# Patient Record
Sex: MALE | Race: WHITE | Employment: OTHER | URBAN - METROPOLITAN AREA
[De-identification: names, ages, dates, MRNs, and addresses within clinical notes are randomized per-mention and may not be internally consistent; named-entity substitution may affect disease eponyms.]

---

## 2015-06-08 LAB — DIABETIC RETINOPATHY: NORMAL

## 2016-03-07 LAB — DIABETIC RETINOPATHY: NORMAL

## 2016-04-11 LAB
CHOLESTEROL, TOTAL: 149 MG/DL
CHOLESTEROL/HDL RATIO: 2.5
HDLC SERPL-MCNC: 58 MG/DL (ref 35–70)
LDL CHOLESTEROL CALCULATED: 89 MG/DL (ref 0–160)
TRIGL SERPL-MCNC: 84 MG/DL
VLDLC SERPL CALC-MCNC: 16 MG/DL

## 2016-08-02 LAB — HBA1C MFR BLD: 6.3 %

## 2017-03-01 PROBLEM — E11.9 TYPE 2 DIABETES MELLITUS (HCC): Status: ACTIVE | Noted: 2017-03-01

## 2017-03-01 PROBLEM — J44.1 COPD EXACERBATION (HCC): Status: ACTIVE | Noted: 2017-03-01

## 2017-03-01 PROBLEM — J40 BRONCHITIS: Status: ACTIVE | Noted: 2017-03-01

## 2017-03-02 ENCOUNTER — TELEPHONE (OUTPATIENT)
Dept: FAMILY MEDICINE CLINIC | Age: 72
End: 2017-03-02

## 2017-03-03 PROBLEM — R11.2 NON-INTRACTABLE VOMITING WITH NAUSEA: Status: ACTIVE | Noted: 2017-03-03

## 2017-03-07 PROBLEM — E16.4 ZOLLINGER-ELLISON SYNDROME: Status: ACTIVE | Noted: 2017-03-07

## 2017-03-20 ENCOUNTER — CARE COORDINATION (OUTPATIENT)
Dept: CARE COORDINATION | Age: 72
End: 2017-03-20

## 2017-03-21 ENCOUNTER — OFFICE VISIT (OUTPATIENT)
Dept: PULMONOLOGY | Age: 72
End: 2017-03-21

## 2017-03-21 VITALS
HEART RATE: 84 BPM | DIASTOLIC BLOOD PRESSURE: 76 MMHG | OXYGEN SATURATION: 98 % | WEIGHT: 208 LBS | SYSTOLIC BLOOD PRESSURE: 120 MMHG | RESPIRATION RATE: 16 BRPM | BODY MASS INDEX: 29.12 KG/M2 | HEIGHT: 71 IN | TEMPERATURE: 99 F

## 2017-03-21 DIAGNOSIS — J96.11 CHRONIC HYPOXEMIC RESPIRATORY FAILURE (HCC): Primary | ICD-10-CM

## 2017-03-21 DIAGNOSIS — J43.2 CENTRILOBULAR EMPHYSEMA (HCC): ICD-10-CM

## 2017-03-21 PROBLEM — J44.9 COPD (CHRONIC OBSTRUCTIVE PULMONARY DISEASE) (HCC): Status: ACTIVE | Noted: 2017-03-21

## 2017-03-21 PROCEDURE — 3017F COLORECTAL CA SCREEN DOC REV: CPT | Performed by: PHYSICIAN ASSISTANT

## 2017-03-21 PROCEDURE — 1036F TOBACCO NON-USER: CPT | Performed by: PHYSICIAN ASSISTANT

## 2017-03-21 PROCEDURE — G8926 SPIRO NO PERF OR DOC: HCPCS | Performed by: PHYSICIAN ASSISTANT

## 2017-03-21 PROCEDURE — 99214 OFFICE O/P EST MOD 30 MIN: CPT | Performed by: PHYSICIAN ASSISTANT

## 2017-03-21 PROCEDURE — G8420 CALC BMI NORM PARAMETERS: HCPCS | Performed by: PHYSICIAN ASSISTANT

## 2017-03-21 PROCEDURE — G8427 DOCREV CUR MEDS BY ELIG CLIN: HCPCS | Performed by: PHYSICIAN ASSISTANT

## 2017-03-21 PROCEDURE — 3023F SPIROM DOC REV: CPT | Performed by: PHYSICIAN ASSISTANT

## 2017-03-21 PROCEDURE — 1123F ACP DISCUSS/DSCN MKR DOCD: CPT | Performed by: PHYSICIAN ASSISTANT

## 2017-03-21 PROCEDURE — 4040F PNEUMOC VAC/ADMIN/RCVD: CPT | Performed by: PHYSICIAN ASSISTANT

## 2017-03-21 PROCEDURE — G8484 FLU IMMUNIZE NO ADMIN: HCPCS | Performed by: PHYSICIAN ASSISTANT

## 2017-03-21 PROCEDURE — 1111F DSCHRG MED/CURRENT MED MERGE: CPT | Performed by: PHYSICIAN ASSISTANT

## 2017-03-21 ASSESSMENT — ENCOUNTER SYMPTOMS
EYES NEGATIVE: 1
NAUSEA: 0
GASTROINTESTINAL NEGATIVE: 1
SHORTNESS OF BREATH: 0
RESPIRATORY NEGATIVE: 1
SPUTUM PRODUCTION: 0
VOMITING: 0
BACK PAIN: 0
COUGH: 0
HEARTBURN: 0
SORE THROAT: 0
HEMOPTYSIS: 0
WHEEZING: 0

## 2017-03-30 ENCOUNTER — OFFICE VISIT (OUTPATIENT)
Dept: FAMILY MEDICINE CLINIC | Age: 72
End: 2017-03-30

## 2017-03-30 VITALS
RESPIRATION RATE: 20 BRPM | TEMPERATURE: 98.5 F | BODY MASS INDEX: 28.31 KG/M2 | DIASTOLIC BLOOD PRESSURE: 64 MMHG | SYSTOLIC BLOOD PRESSURE: 130 MMHG | HEART RATE: 80 BPM | OXYGEN SATURATION: 97 % | WEIGHT: 203 LBS

## 2017-03-30 DIAGNOSIS — J44.9 CHRONIC OBSTRUCTIVE PULMONARY DISEASE, UNSPECIFIED COPD TYPE (HCC): ICD-10-CM

## 2017-03-30 DIAGNOSIS — B37.0 THRUSH: ICD-10-CM

## 2017-03-30 DIAGNOSIS — I82.4Y3 DEEP VEIN THROMBOSIS (DVT) OF PROXIMAL VEIN OF BOTH LOWER EXTREMITIES, UNSPECIFIED CHRONICITY (HCC): Primary | ICD-10-CM

## 2017-03-30 DIAGNOSIS — E11.9 TYPE 2 DIABETES MELLITUS WITHOUT COMPLICATION, WITHOUT LONG-TERM CURRENT USE OF INSULIN (HCC): ICD-10-CM

## 2017-03-30 PROCEDURE — 99495 TRANSJ CARE MGMT MOD F2F 14D: CPT | Performed by: FAMILY MEDICINE

## 2017-03-30 ASSESSMENT — PATIENT HEALTH QUESTIONNAIRE - PHQ9
SUM OF ALL RESPONSES TO PHQ QUESTIONS 1-9: 0
1. LITTLE INTEREST OR PLEASURE IN DOING THINGS: 0
2. FEELING DOWN, DEPRESSED OR HOPELESS: 0
SUM OF ALL RESPONSES TO PHQ9 QUESTIONS 1 & 2: 0

## 2017-04-03 ENCOUNTER — CARE COORDINATION (OUTPATIENT)
Dept: CARE COORDINATION | Age: 72
End: 2017-04-03

## 2017-04-04 ENCOUNTER — TELEPHONE (OUTPATIENT)
Dept: FAMILY MEDICINE CLINIC | Age: 72
End: 2017-04-04

## 2017-05-04 ENCOUNTER — CARE COORDINATION (OUTPATIENT)
Dept: CARE COORDINATION | Age: 72
End: 2017-05-04

## 2017-05-05 ENCOUNTER — OFFICE VISIT (OUTPATIENT)
Dept: PULMONOLOGY | Age: 72
End: 2017-05-05

## 2017-05-05 VITALS
HEIGHT: 70 IN | HEART RATE: 76 BPM | RESPIRATION RATE: 17 BRPM | TEMPERATURE: 98.6 F | WEIGHT: 207 LBS | DIASTOLIC BLOOD PRESSURE: 68 MMHG | BODY MASS INDEX: 29.63 KG/M2 | SYSTOLIC BLOOD PRESSURE: 138 MMHG | OXYGEN SATURATION: 95 %

## 2017-05-05 DIAGNOSIS — J96.11 CHRONIC RESPIRATORY FAILURE WITH HYPOXIA (HCC): ICD-10-CM

## 2017-05-05 DIAGNOSIS — J43.2 CENTRILOBULAR EMPHYSEMA (HCC): Primary | ICD-10-CM

## 2017-05-05 PROCEDURE — G8427 DOCREV CUR MEDS BY ELIG CLIN: HCPCS | Performed by: PHYSICIAN ASSISTANT

## 2017-05-05 PROCEDURE — 99213 OFFICE O/P EST LOW 20 MIN: CPT | Performed by: PHYSICIAN ASSISTANT

## 2017-05-05 PROCEDURE — 1036F TOBACCO NON-USER: CPT | Performed by: PHYSICIAN ASSISTANT

## 2017-05-05 PROCEDURE — 1123F ACP DISCUSS/DSCN MKR DOCD: CPT | Performed by: PHYSICIAN ASSISTANT

## 2017-05-05 PROCEDURE — 3017F COLORECTAL CA SCREEN DOC REV: CPT | Performed by: PHYSICIAN ASSISTANT

## 2017-05-05 PROCEDURE — 3023F SPIROM DOC REV: CPT | Performed by: PHYSICIAN ASSISTANT

## 2017-05-05 PROCEDURE — 4040F PNEUMOC VAC/ADMIN/RCVD: CPT | Performed by: PHYSICIAN ASSISTANT

## 2017-05-05 PROCEDURE — G8926 SPIRO NO PERF OR DOC: HCPCS | Performed by: PHYSICIAN ASSISTANT

## 2017-05-05 PROCEDURE — G8420 CALC BMI NORM PARAMETERS: HCPCS | Performed by: PHYSICIAN ASSISTANT

## 2017-05-05 ASSESSMENT — ENCOUNTER SYMPTOMS
SORE THROAT: 0
BACK PAIN: 0
HEARTBURN: 0
HEMOPTYSIS: 0
NAUSEA: 0
COUGH: 0
SHORTNESS OF BREATH: 1
EYES NEGATIVE: 1
SPUTUM PRODUCTION: 0
VOMITING: 0
GASTROINTESTINAL NEGATIVE: 1
WHEEZING: 0

## 2017-05-22 ENCOUNTER — TELEPHONE (OUTPATIENT)
Dept: PULMONOLOGY | Age: 72
End: 2017-05-22

## 2017-06-19 ENCOUNTER — CARE COORDINATION (OUTPATIENT)
Dept: CARE COORDINATION | Age: 72
End: 2017-06-19

## 2017-08-07 ENCOUNTER — OFFICE VISIT (OUTPATIENT)
Dept: PULMONOLOGY | Age: 72
End: 2017-08-07
Payer: MEDICARE

## 2017-08-07 VITALS
SYSTOLIC BLOOD PRESSURE: 134 MMHG | BODY MASS INDEX: 31.08 KG/M2 | TEMPERATURE: 98.6 F | RESPIRATION RATE: 20 BRPM | HEIGHT: 71 IN | OXYGEN SATURATION: 93 % | HEART RATE: 73 BPM | DIASTOLIC BLOOD PRESSURE: 62 MMHG | WEIGHT: 222 LBS

## 2017-08-07 DIAGNOSIS — J96.11 CHRONIC HYPOXEMIC RESPIRATORY FAILURE (HCC): ICD-10-CM

## 2017-08-07 DIAGNOSIS — J43.2 CENTRILOBULAR EMPHYSEMA (HCC): Primary | ICD-10-CM

## 2017-08-07 PROCEDURE — G8926 SPIRO NO PERF OR DOC: HCPCS | Performed by: PHYSICIAN ASSISTANT

## 2017-08-07 PROCEDURE — G8417 CALC BMI ABV UP PARAM F/U: HCPCS | Performed by: PHYSICIAN ASSISTANT

## 2017-08-07 PROCEDURE — 1036F TOBACCO NON-USER: CPT | Performed by: PHYSICIAN ASSISTANT

## 2017-08-07 PROCEDURE — 99213 OFFICE O/P EST LOW 20 MIN: CPT | Performed by: PHYSICIAN ASSISTANT

## 2017-08-07 PROCEDURE — G8427 DOCREV CUR MEDS BY ELIG CLIN: HCPCS | Performed by: PHYSICIAN ASSISTANT

## 2017-08-07 PROCEDURE — 4040F PNEUMOC VAC/ADMIN/RCVD: CPT | Performed by: PHYSICIAN ASSISTANT

## 2017-08-07 PROCEDURE — 3017F COLORECTAL CA SCREEN DOC REV: CPT | Performed by: PHYSICIAN ASSISTANT

## 2017-08-07 PROCEDURE — 3023F SPIROM DOC REV: CPT | Performed by: PHYSICIAN ASSISTANT

## 2017-08-07 PROCEDURE — 1123F ACP DISCUSS/DSCN MKR DOCD: CPT | Performed by: PHYSICIAN ASSISTANT

## 2017-08-07 ASSESSMENT — ENCOUNTER SYMPTOMS
VOMITING: 0
BACK PAIN: 0
HEMOPTYSIS: 0
GASTROINTESTINAL NEGATIVE: 1
COUGH: 0
EYES NEGATIVE: 1
SORE THROAT: 0
NAUSEA: 0
SPUTUM PRODUCTION: 0
WHEEZING: 0
SHORTNESS OF BREATH: 1
HEARTBURN: 0

## 2017-08-08 ENCOUNTER — TELEPHONE (OUTPATIENT)
Dept: PULMONOLOGY | Age: 72
End: 2017-08-08

## 2017-08-22 ENCOUNTER — OFFICE VISIT (OUTPATIENT)
Dept: FAMILY MEDICINE CLINIC | Age: 72
End: 2017-08-22
Payer: MEDICARE

## 2017-08-22 VITALS
SYSTOLIC BLOOD PRESSURE: 138 MMHG | DIASTOLIC BLOOD PRESSURE: 66 MMHG | HEART RATE: 64 BPM | BODY MASS INDEX: 30.82 KG/M2 | TEMPERATURE: 98.6 F | WEIGHT: 221 LBS | RESPIRATION RATE: 14 BRPM

## 2017-08-22 DIAGNOSIS — J02.9 EXUDATIVE PHARYNGITIS: Primary | ICD-10-CM

## 2017-08-22 PROCEDURE — G8427 DOCREV CUR MEDS BY ELIG CLIN: HCPCS | Performed by: FAMILY MEDICINE

## 2017-08-22 PROCEDURE — 3017F COLORECTAL CA SCREEN DOC REV: CPT | Performed by: FAMILY MEDICINE

## 2017-08-22 PROCEDURE — 1036F TOBACCO NON-USER: CPT | Performed by: FAMILY MEDICINE

## 2017-08-22 PROCEDURE — 99213 OFFICE O/P EST LOW 20 MIN: CPT | Performed by: FAMILY MEDICINE

## 2017-08-22 PROCEDURE — G8417 CALC BMI ABV UP PARAM F/U: HCPCS | Performed by: FAMILY MEDICINE

## 2017-08-22 PROCEDURE — 1123F ACP DISCUSS/DSCN MKR DOCD: CPT | Performed by: FAMILY MEDICINE

## 2017-08-22 PROCEDURE — 4040F PNEUMOC VAC/ADMIN/RCVD: CPT | Performed by: FAMILY MEDICINE

## 2017-08-22 RX ORDER — AMOXICILLIN 500 MG/1
500 CAPSULE ORAL 3 TIMES DAILY
Qty: 30 CAPSULE | Refills: 0 | Status: SHIPPED | OUTPATIENT
Start: 2017-08-22 | End: 2017-11-13 | Stop reason: SDUPTHER

## 2017-08-22 ASSESSMENT — ENCOUNTER SYMPTOMS
SINUS PRESSURE: 0
COUGH: 0
CONSTIPATION: 0
NAUSEA: 0
DIARRHEA: 0
SORE THROAT: 1
ABDOMINAL PAIN: 0
SHORTNESS OF BREATH: 0
RHINORRHEA: 0
ABDOMINAL DISTENTION: 0
EYE PAIN: 0

## 2017-08-25 RX ORDER — SUCRALFATE ORAL 1 G/10ML
1 SUSPENSION ORAL
Qty: 1200 ML | Refills: 3 | Status: SHIPPED | OUTPATIENT
Start: 2017-08-25 | End: 2018-08-27

## 2017-10-11 ENCOUNTER — TELEPHONE (OUTPATIENT)
Dept: PULMONOLOGY | Age: 72
End: 2017-10-11

## 2017-10-12 DIAGNOSIS — J43.2 CENTRILOBULAR EMPHYSEMA (HCC): Primary | ICD-10-CM

## 2017-10-12 NOTE — TELEPHONE ENCOUNTER
Faxed order to [facility]. Left message for patient advising of this.   Mailed original order to patient per his request.

## 2017-11-07 ENCOUNTER — CARE COORDINATION (OUTPATIENT)
Dept: CARE COORDINATION | Age: 72
End: 2017-11-07

## 2017-11-08 ENCOUNTER — TELEPHONE (OUTPATIENT)
Dept: FAMILY MEDICINE CLINIC | Age: 72
End: 2017-11-08

## 2017-11-13 ENCOUNTER — OFFICE VISIT (OUTPATIENT)
Dept: FAMILY MEDICINE CLINIC | Age: 72
End: 2017-11-13
Payer: MEDICARE

## 2017-11-13 VITALS
DIASTOLIC BLOOD PRESSURE: 60 MMHG | BODY MASS INDEX: 32 KG/M2 | HEIGHT: 71 IN | WEIGHT: 228.6 LBS | SYSTOLIC BLOOD PRESSURE: 122 MMHG | TEMPERATURE: 98 F | HEART RATE: 72 BPM | RESPIRATION RATE: 14 BRPM

## 2017-11-13 DIAGNOSIS — J40 BRONCHITIS: Primary | ICD-10-CM

## 2017-11-13 PROCEDURE — 4040F PNEUMOC VAC/ADMIN/RCVD: CPT | Performed by: FAMILY MEDICINE

## 2017-11-13 PROCEDURE — 1123F ACP DISCUSS/DSCN MKR DOCD: CPT | Performed by: FAMILY MEDICINE

## 2017-11-13 PROCEDURE — 99213 OFFICE O/P EST LOW 20 MIN: CPT | Performed by: FAMILY MEDICINE

## 2017-11-13 PROCEDURE — G8482 FLU IMMUNIZE ORDER/ADMIN: HCPCS | Performed by: FAMILY MEDICINE

## 2017-11-13 PROCEDURE — 1036F TOBACCO NON-USER: CPT | Performed by: FAMILY MEDICINE

## 2017-11-13 PROCEDURE — G8427 DOCREV CUR MEDS BY ELIG CLIN: HCPCS | Performed by: FAMILY MEDICINE

## 2017-11-13 PROCEDURE — G8417 CALC BMI ABV UP PARAM F/U: HCPCS | Performed by: FAMILY MEDICINE

## 2017-11-13 PROCEDURE — 3017F COLORECTAL CA SCREEN DOC REV: CPT | Performed by: FAMILY MEDICINE

## 2017-11-13 RX ORDER — AMOXICILLIN 500 MG/1
500 CAPSULE ORAL 3 TIMES DAILY
Qty: 30 CAPSULE | Refills: 0 | Status: SHIPPED | OUTPATIENT
Start: 2017-11-13 | End: 2017-11-23

## 2017-11-13 ASSESSMENT — ENCOUNTER SYMPTOMS
SINUS PRESSURE: 1
SORE THROAT: 1
EYE PAIN: 0
CONSTIPATION: 0
SHORTNESS OF BREATH: 0
ABDOMINAL PAIN: 0
SINUS PAIN: 1
NAUSEA: 0
DIARRHEA: 0
COUGH: 1
RHINORRHEA: 1
ABDOMINAL DISTENTION: 0

## 2017-11-13 NOTE — PROGRESS NOTES
[address]  [facility]  [facility]  Dept: [phone]  Dept Fax: [phone]: [phone]  PROGRESS NOTE      Visit Date: 11/13/2017    Ata Topete is a 67 y.o. male who presents today for:  Chief Complaint   Patient presents with    Other     here today for chest cold- cough, runny nose, stuffy nose, sore throat x friday. Subjective:  HPI  Patient with history of CBD comes in with chest cold tightness wheezing. 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  Skin runny nose sore throat symptoms since Friday nothing makes better or worse, symptoms seem to be worsening    Review of Systems   Constitutional: Negative for appetite change and fever. HENT: Positive for congestion, postnasal drip, rhinorrhea, sinus pain, sinus pressure and sore throat. Negative for ear pain. Eyes: Negative for pain and visual disturbance. Respiratory: Positive for cough. Negative for shortness of breath. Cardiovascular: Negative for chest pain. Gastrointestinal: Negative for abdominal distention, abdominal pain, constipation, diarrhea and nausea. Genitourinary: Negative for dysuria, frequency and urgency. Musculoskeletal: Negative for arthralgias. Skin: Negative for rash. Neurological: Negative for dizziness. Past Medical History:   Diagnosis Date    Arthritis     Cancer ([facility] [address] 1990    Spelenectomy, cholycystectomy,partial pancrease, partial stomache, lymphnodes    COPD (chronic obstructive pulmonary disease) (HCC)     Hypertension     Nausea & vomiting     Type II or unspecified type diabetes mellitus without mention of complication, not stated as uncontrolled       Past Surgical History:   Procedure Laterality Date    CHOLECYSTECTOMY      COLONOSCOPY  3/16/12    [facility]    CYST REMOVAL      FROM NECK AND BACK    HERNIA REPAIR      OTHER SURGICAL HISTORY  1/25/2013    RIGHT POST.  CERVICAL LAMINOTOMIES AND FORAMINOTOMIES   Brand [place] materials - see patient instructions. Discussed use, benefit, and side effects of prescribed medications. All patient questions answered. Pt voiced understanding. Reviewed health maintenance. Patient agreed with treatment plan. Follow up as directed. **This report has been created using voice recognition software. It may contain minor errors which are inherent in voice recognition technology. **       Electronically signed by Claudell Alken, MD on 11/13/2017 at 5:40 PM

## 2017-11-30 ENCOUNTER — TELEPHONE (OUTPATIENT)
Dept: FAMILY MEDICINE CLINIC | Age: 72
End: 2017-11-30

## 2017-11-30 RX ORDER — AZITHROMYCIN 250 MG/1
TABLET, FILM COATED ORAL
Qty: 1 PACKET | Refills: 0 | Status: SHIPPED | OUTPATIENT
Start: 2017-11-30 | End: 2017-12-10

## 2017-11-30 NOTE — TELEPHONE ENCOUNTER
Patient was seen 11/13/17 and given a prescription for cold symptoms. He is calling in because he finished the medication, and the next day his symptoms came right back, sore throat, cough, sinus congestion. Is there another medication he can try? He uses [facility] on [address], please advise.

## 2017-12-20 ENCOUNTER — CARE COORDINATION (OUTPATIENT)
Dept: CARE COORDINATION | Age: 72
End: 2017-12-20

## 2018-01-16 ENCOUNTER — CARE COORDINATION (OUTPATIENT)
Dept: CARE COORDINATION | Age: 73
End: 2018-01-16

## 2018-03-05 ENCOUNTER — CARE COORDINATION (OUTPATIENT)
Dept: CARE COORDINATION | Age: 73
End: 2018-03-05

## 2018-03-06 ENCOUNTER — OFFICE VISIT (OUTPATIENT)
Dept: PULMONOLOGY | Age: 73
End: 2018-03-06
Payer: MEDICARE

## 2018-03-06 VITALS
SYSTOLIC BLOOD PRESSURE: 124 MMHG | WEIGHT: 225 LBS | HEIGHT: 71 IN | DIASTOLIC BLOOD PRESSURE: 74 MMHG | OXYGEN SATURATION: 93 % | HEART RATE: 73 BPM | BODY MASS INDEX: 31.5 KG/M2

## 2018-03-06 DIAGNOSIS — J43.2 CENTRILOBULAR EMPHYSEMA (HCC): Primary | ICD-10-CM

## 2018-03-06 DIAGNOSIS — J43.9 NOCTURNAL HYPOXEMIA DUE TO EMPHYSEMA (HCC): ICD-10-CM

## 2018-03-06 DIAGNOSIS — G47.36 NOCTURNAL HYPOXEMIA DUE TO EMPHYSEMA (HCC): ICD-10-CM

## 2018-03-06 PROCEDURE — G8926 SPIRO NO PERF OR DOC: HCPCS | Performed by: PHYSICIAN ASSISTANT

## 2018-03-06 PROCEDURE — 99213 OFFICE O/P EST LOW 20 MIN: CPT | Performed by: PHYSICIAN ASSISTANT

## 2018-03-06 PROCEDURE — G8482 FLU IMMUNIZE ORDER/ADMIN: HCPCS | Performed by: PHYSICIAN ASSISTANT

## 2018-03-06 PROCEDURE — G8417 CALC BMI ABV UP PARAM F/U: HCPCS | Performed by: PHYSICIAN ASSISTANT

## 2018-03-06 PROCEDURE — 3017F COLORECTAL CA SCREEN DOC REV: CPT | Performed by: PHYSICIAN ASSISTANT

## 2018-03-06 PROCEDURE — 4040F PNEUMOC VAC/ADMIN/RCVD: CPT | Performed by: PHYSICIAN ASSISTANT

## 2018-03-06 PROCEDURE — G8427 DOCREV CUR MEDS BY ELIG CLIN: HCPCS | Performed by: PHYSICIAN ASSISTANT

## 2018-03-06 PROCEDURE — 3023F SPIROM DOC REV: CPT | Performed by: PHYSICIAN ASSISTANT

## 2018-03-06 PROCEDURE — 1123F ACP DISCUSS/DSCN MKR DOCD: CPT | Performed by: PHYSICIAN ASSISTANT

## 2018-03-06 PROCEDURE — 1036F TOBACCO NON-USER: CPT | Performed by: PHYSICIAN ASSISTANT

## 2018-03-06 ASSESSMENT — ENCOUNTER SYMPTOMS
SINUS PAIN: 0
SPUTUM PRODUCTION: 0
ORTHOPNEA: 0
WHEEZING: 0
SORE THROAT: 0
RESPIRATORY NEGATIVE: 1
COUGH: 0
SHORTNESS OF BREATH: 0
NAUSEA: 0
HEARTBURN: 0
EYES NEGATIVE: 1
GASTROINTESTINAL NEGATIVE: 1

## 2018-03-06 NOTE — PROGRESS NOTES
[facility]    Patient: Sergio Mckee, 67 y.o.   : 1945  3/6/2018    Pt of Dr. Jabari Lema   Patient presents with    Shortness of Breath     EMphysema 7 mo f/u, needs face to face for medicare for O2 @ night , testing not required        HPI  Baltazar Faust is here for follow up for Emphysema. He needs face to face for O2 per medicare. He is wearing O2 at 2 lnc at night. He is getting benefit from O2. He is still working and staying active. His dyspnea is the same. He is taking Advair with good benefit. He is rarely using albuterol. Progress History:   Since last visit any new medical issues? No  New ER or hospitlal visits? No  Any new or changes in medicines? No  Using inhalers? No  Are they helpful? No  Past Medical hx   PMH:  Past Medical History:   Diagnosis Date    Arthritis     Cancer ([facility] [address]     Spelenectomy, cholycystectomy,partial pancrease, partial stomache, lymphnodes    COPD (chronic obstructive pulmonary disease) (HCC)     Hypertension     Nausea & vomiting     Type II or unspecified type diabetes mellitus without mention of complication, not stated as uncontrolled      SURGICAL HISTORY:  Past Surgical History:   Procedure Laterality Date    CHOLECYSTECTOMY      COLONOSCOPY  3/16/12    [address]    CYST REMOVAL      FROM NECK AND BACK    HERNIA REPAIR      OTHER SURGICAL HISTORY  2013    RIGHT POST.  CERVICAL LAMINOTOMIES AND FORAMINOTOMIES    PANCREAS SURGERY      partial removal     PARATHYROID GLAND SURGERY      PILONIDAL CYST EXCISION      SPLENECTOMY, TOTAL       SOCIAL HISTORY:  Social History   Substance Use Topics    Smoking status: Former Smoker     Packs/day: 2.00     Years: 20.00     Types: Cigarettes     Quit date: 1991    Smokeless tobacco: Never Used    Alcohol use 0.0 oz/week      Comment: rarely     ALLERGIES:  Allergies   Allergen Reactions    Propofol Anaphylaxis    Bee Venom

## 2018-04-23 ENCOUNTER — OFFICE VISIT (OUTPATIENT)
Dept: PSYCHOLOGY | Age: 73
End: 2018-04-23
Payer: MEDICARE

## 2018-04-23 DIAGNOSIS — F43.21 COMPLICATED GRIEF: ICD-10-CM

## 2018-04-23 DIAGNOSIS — F43.21 ADJUSTMENT DISORDER WITH DEPRESSED MOOD: ICD-10-CM

## 2018-04-23 PROCEDURE — 90791 PSYCH DIAGNOSTIC EVALUATION: CPT | Performed by: PSYCHOLOGIST

## 2018-04-23 ASSESSMENT — PATIENT HEALTH QUESTIONNAIRE - PHQ9
8. MOVING OR SPEAKING SO SLOWLY THAT OTHER PEOPLE COULD HAVE NOTICED. OR THE OPPOSITE, BEING SO FIGETY OR RESTLESS THAT YOU HAVE BEEN MOVING AROUND A LOT MORE THAN USUAL: 0
2. FEELING DOWN, DEPRESSED OR HOPELESS: 0
4. FEELING TIRED OR HAVING LITTLE ENERGY: 1
1. LITTLE INTEREST OR PLEASURE IN DOING THINGS: 0
3. TROUBLE FALLING OR STAYING ASLEEP: 0
6. FEELING BAD ABOUT YOURSELF - OR THAT YOU ARE A FAILURE OR HAVE LET YOURSELF OR YOUR FAMILY DOWN: 0
9. THOUGHTS THAT YOU WOULD BE BETTER OFF DEAD, OR OF HURTING YOURSELF: 0
5. POOR APPETITE OR OVEREATING: 0
10. IF YOU CHECKED OFF ANY PROBLEMS, HOW DIFFICULT HAVE THESE PROBLEMS MADE IT FOR YOU TO DO YOUR WORK, TAKE CARE OF THINGS AT HOME, OR GET ALONG WITH OTHER PEOPLE: 0
7. TROUBLE CONCENTRATING ON THINGS, SUCH AS READING THE NEWSPAPER OR WATCHING TELEVISION: 0
SUM OF ALL RESPONSES TO PHQ QUESTIONS 1-9: 1
SUM OF ALL RESPONSES TO PHQ9 QUESTIONS 1 & 2: 0

## 2018-04-23 ASSESSMENT — ANXIETY QUESTIONNAIRES
5. BEING SO RESTLESS THAT IT IS HARD TO SIT STILL: 0-NOT AT ALL SURE
3. WORRYING TOO MUCH ABOUT DIFFERENT THINGS: 0-NOT AT ALL SURE
7. FEELING AFRAID AS IF SOMETHING AWFUL MIGHT HAPPEN: 0-NOT AT ALL SURE
GAD7 TOTAL SCORE: 2
6. BECOMING EASILY ANNOYED OR IRRITABLE: 1-SEVERAL DAYS
2. NOT BEING ABLE TO STOP OR CONTROL WORRYING: 0-NOT AT ALL SURE
1. FEELING NERVOUS, ANXIOUS, OR ON EDGE: 1-SEVERAL DAYS
4. TROUBLE RELAXING: 0-NOT AT ALL SURE

## 2018-04-24 PROBLEM — F43.21 ADJUSTMENT DISORDER WITH DEPRESSED MOOD: Status: ACTIVE | Noted: 2018-04-24

## 2018-04-25 ENCOUNTER — CARE COORDINATION (OUTPATIENT)
Dept: CARE COORDINATION | Age: 73
End: 2018-04-25

## 2018-06-21 ENCOUNTER — CARE COORDINATION (OUTPATIENT)
Dept: CARE COORDINATION | Age: 73
End: 2018-06-21

## 2018-06-21 NOTE — CARE COORDINATION
Attempted to reach patient for continued Care Coordination follow up and education. Patient was unavailable at the time of my call, and a generic voicemail message was left asking patient to return my call at [phone].

## 2018-07-12 NOTE — CARE COORDINATION
I spoke briefly with Jaylyn Pena and she states the patient is doing extremely well. Denies current issues or concerns. I advised Jaylyn Pena to contact PCP office if needed. No further needs at this time.

## 2018-08-20 ENCOUNTER — CARE COORDINATION (OUTPATIENT)
Dept: CARE COORDINATION | Age: 73
End: 2018-08-20

## 2018-08-20 NOTE — CARE COORDINATION
Ambulatory Care Coordination Note  8/20/2018  CM Risk Score: 3  [place] Mortality Risk Score: 7.65    ACC: Jose Manuel Nravaez, RN    Summary Note: David Blake is currently at work so this [address] spoke with his wife. She states that he has complaining of right sided hip/back pain for a few months. Denies any other issues. Appt scheduled with PCP. Care Coordination Interventions    Program Enrollment:  Rising Risk  Referral from Primary Care Provider:  No  Suggested Interventions and Community Resources  Diabetes Education: In Process  Fall Risk Prevention: In Process  Zone Management Tools: In Process         Goals Addressed             Most Recent     Conditions and Symptoms   Improving (8/20/2018)             I will notify my provider of any symptoms that indicate a worsening of my condition. Barriers: overwhelmed by complexity of regimen  Plan for overcoming my barriers: Will discuss questions/concerns with office or care coordinator  Confidence: 9/10  Anticipated Goal Completion Date: 2/8/18              Prior to Admission medications    Medication Sig Start Date End Date Taking?  Authorizing Provider   ADVAIR DISKUS 250-50 MCG/DOSE AEPB USE 1 INHALATION TWICE A DAY 8/20/18   Savannah Smalls PA-C   sucralfate (CARAFATE) 1 GM/10ML suspension Take 10 mLs by mouth 4 times daily (before meals and nightly) 8/25/17   Nicki Marrero MD   albuterol sulfate (PROAIR RESPICLICK) 584 (90 Base) MCG/ACT aerosol powder inhalation Inhale 2 puffs into the lungs every 6 hours as needed for Wheezing or Shortness of Breath 8/8/17   Savannah Smalls PA-C   OXYGEN Inhale into the lungs 2 L qhs  per [place]an[place] Zelaya    Historical Provider, MD   glipiZIDE (GLIPIZIDE XL) 10 MG extended release tablet Take 10 mg by mouth 2 times daily    Historical Provider, MD   sildenafil (VIAGRA) 100 MG tablet Take 100 mg by mouth as needed for Erectile Dysfunction    Historical Provider, MD   ipratropium-albuterol (DUONEB) 0.5-2.5 (3) MG/3ML SOLN nebulizer solution Inhale 3 mLs into the lungs every 4 hours as needed for Shortness of Breath 2/18/17   SHONNA Daly CNP   ALPRAZolam [place] Mg) 0.25 MG tablet Take 0.25 mg by mouth nightly as needed for Sleep. Historical Provider, MD   TRAVATALALITA Z 0.004 % SOLN ophthalmic solution Place 1 drop into both eyes nightly. 9/12/12   Historical Provider, MD   Insulin Lispro Prot & Lispro (HUMALOG MIX 75/25 SC) Inject 30 Units into the skin 2 times daily     Historical Provider, MD   quinapril (ACCUPRIL) 20 MG tablet Take 20 mg by mouth 2 times daily. Historical Provider, MD   esomeprazole (NEXIUM) 40 MG capsule Take 40 mg by mouth 2 times daily.       Historical Provider, MD   metformin (GLUCOPHAGE) 500 MG tablet Take 500 mg by mouth 2 times daily (with meals) HOLD 48 HRS PRIOR TO SURGERY    Historical Provider, MD       Future Appointments  Date Time Provider Nancy Mendezisti   8/23/2018 2:40 PM Victoria Roa MD [facility]   3/6/2019 1:30 PM SHONNA Rodriguez - CNP Pul Med [address] [facility]

## 2018-08-27 ENCOUNTER — OFFICE VISIT (OUTPATIENT)
Dept: FAMILY MEDICINE CLINIC | Age: 73
End: 2018-08-27
Payer: MEDICARE

## 2018-08-27 VITALS
RESPIRATION RATE: 16 BRPM | WEIGHT: 234 LBS | DIASTOLIC BLOOD PRESSURE: 62 MMHG | HEART RATE: 71 BPM | SYSTOLIC BLOOD PRESSURE: 138 MMHG | BODY MASS INDEX: 32.64 KG/M2

## 2018-08-27 DIAGNOSIS — M54.16 LUMBAR RADICULOPATHY: Primary | ICD-10-CM

## 2018-08-27 DIAGNOSIS — E11.9 TYPE 2 DIABETES MELLITUS WITHOUT COMPLICATION, WITHOUT LONG-TERM CURRENT USE OF INSULIN (HCC): ICD-10-CM

## 2018-08-27 PROCEDURE — 96372 THER/PROPH/DIAG INJ SC/IM: CPT | Performed by: FAMILY MEDICINE

## 2018-08-27 PROCEDURE — 1101F PT FALLS ASSESS-DOCD LE1/YR: CPT | Performed by: FAMILY MEDICINE

## 2018-08-27 PROCEDURE — 99213 OFFICE O/P EST LOW 20 MIN: CPT | Performed by: FAMILY MEDICINE

## 2018-08-27 PROCEDURE — 2022F DILAT RTA XM EVC RTNOPTHY: CPT | Performed by: FAMILY MEDICINE

## 2018-08-27 PROCEDURE — 1123F ACP DISCUSS/DSCN MKR DOCD: CPT | Performed by: FAMILY MEDICINE

## 2018-08-27 PROCEDURE — G8417 CALC BMI ABV UP PARAM F/U: HCPCS | Performed by: FAMILY MEDICINE

## 2018-08-27 PROCEDURE — 3046F HEMOGLOBIN A1C LEVEL >9.0%: CPT | Performed by: FAMILY MEDICINE

## 2018-08-27 PROCEDURE — 1036F TOBACCO NON-USER: CPT | Performed by: FAMILY MEDICINE

## 2018-08-27 PROCEDURE — 3017F COLORECTAL CA SCREEN DOC REV: CPT | Performed by: FAMILY MEDICINE

## 2018-08-27 PROCEDURE — 4040F PNEUMOC VAC/ADMIN/RCVD: CPT | Performed by: FAMILY MEDICINE

## 2018-08-27 PROCEDURE — G8427 DOCREV CUR MEDS BY ELIG CLIN: HCPCS | Performed by: FAMILY MEDICINE

## 2018-08-27 RX ORDER — DOXAZOSIN 8 MG/1
1 TABLET ORAL NIGHTLY
COMMUNITY
Start: 2018-08-16

## 2018-08-27 RX ORDER — ATORVASTATIN CALCIUM 10 MG/1
1 TABLET, FILM COATED ORAL NIGHTLY
COMMUNITY
Start: 2018-08-16

## 2018-08-27 RX ORDER — METHYLPREDNISOLONE ACETATE 80 MG/ML
160 INJECTION, SUSPENSION INTRA-ARTICULAR; INTRALESIONAL; INTRAMUSCULAR; SOFT TISSUE ONCE
Status: COMPLETED | OUTPATIENT
Start: 2018-08-27 | End: 2018-08-27

## 2018-08-27 RX ADMIN — METHYLPREDNISOLONE ACETATE 160 MG: 80 INJECTION, SUSPENSION INTRA-ARTICULAR; INTRALESIONAL; INTRAMUSCULAR; SOFT TISSUE at 13:54

## 2018-08-27 ASSESSMENT — ENCOUNTER SYMPTOMS
SINUS PRESSURE: 0
COUGH: 0
ABDOMINAL DISTENTION: 0
DIARRHEA: 0
RHINORRHEA: 0
EYE PAIN: 0
SHORTNESS OF BREATH: 0
ABDOMINAL PAIN: 0
NAUSEA: 0
SORE THROAT: 0
CONSTIPATION: 0

## 2018-08-28 NOTE — PROGRESS NOTES
[address] [address]  Dept: [phone]  Dept Fax: [phone]  Loc: [phone]  PROGRESS NOTE      Visit Date: 8/27/2018    Jeremy Blake is a 75 y.o. male who presents today for:  Chief Complaint   Patient presents with    Hip Pain     c/o right hip pain x 10 days. Starts in hip and goes down to knee. Tylenol relieves some. Subjective:  HPI  Patient comes in complaints of hip pain for the past 10 days. No known injury. He indicates the pain begins in his lower back comes down his leg to about the level of his knee. No numbness or tingling only pain. Hurts worse when he first gets up and moves and seems to get better. Resets after every. Of inactivity    Review of Systems   Constitutional: Negative for appetite change and fever. HENT: Negative for congestion, ear pain, postnasal drip, rhinorrhea, sinus pressure and sore throat. Eyes: Negative for pain and visual disturbance. Respiratory: Negative for cough and shortness of breath. Cardiovascular: Negative for chest pain. Gastrointestinal: Negative for abdominal distention, abdominal pain, constipation, diarrhea and nausea. Genitourinary: Negative for dysuria, frequency and urgency. Musculoskeletal: Positive for arthralgias and gait problem. Skin: Negative for rash. Neurological: Negative for dizziness.      Past Medical History:   Diagnosis Date    Arthritis     Cancer ([facility] [address] 1990    Spelenectomy, cholycystectomy,partial pancrease, partial stomache, lymphnodes    COPD (chronic obstructive pulmonary disease) (HCC)     Hypertension     Nausea & vomiting     Type II or unspecified type diabetes mellitus without mention of complication, not stated as uncontrolled       Past Surgical History:   Procedure Laterality Date    CHOLECYSTECTOMY      [facility]    CYST REMOVAL      FROM NECK AND BACK    HERNIA REPAIR      OTHER SURGICAL HISTORY  1/25/2013    RIGHT POST. CERVICAL LAMINOTOMIES AND FORAMINOTOMIES    PANCREAS SURGERY      partial removal     PARATHYROID GLAND SURGERY      PILONIDAL CYST EXCISION      SPLENECTOMY, TOTAL       Family History   Problem Relation Age of Onset    Cancer Father         zollinger shay syndrome    Cancer Son         carcinoid cancer     Social History   Substance Use Topics    Smoking status: Former Smoker     Packs/day: 2.00     Years: 20.00     Types: Cigarettes     Quit date: 5/13/1991    Smokeless tobacco: Never Used    Alcohol use 0.0 oz/week      Comment: rarely      Current Outpatient Prescriptions   Medication Sig Dispense Refill    atorvastatin (LIPITOR) 10 MG tablet Take 1 tablet by mouth daily      doxazosin (CARDURA) 8 MG tablet Take 1 tablet by mouth daily      ADVAIR DISKUS 250-50 MCG/DOSE AEPB USE 1 INHALATION TWICE A  each 3    albuterol sulfate (PROAIR RESPICLICK) 446 (90 Base) MCG/ACT aerosol powder inhalation Inhale 2 puffs into the lungs every 6 hours as needed for Wheezing or Shortness of Breath 3 Inhaler 3    OXYGEN Inhale into the lungs 2 L qhs  per Tenisha Ambriz      glipiZIDE (GLIPIZIDE XL) 10 MG extended release tablet Take 10 mg by mouth 2 times daily      sildenafil (VIAGRA) 100 MG tablet Take 100 mg by mouth as needed for Erectile Dysfunction      ALPRAZolam (XANAX) 0.25 MG tablet Take 0.25 mg by mouth nightly as needed for Sleep.  TRAVATAN Z 0.004 % SOLN ophthalmic solution Place 1 drop into both eyes nightly.  Insulin Lispro Prot & Lispro (HUMALOG MIX 75/25 SC) Inject 30 Units into the skin 2 times daily       quinapril (ACCUPRIL) 20 MG tablet Take 20 mg by mouth 2 times daily.  esomeprazole (NEXIUM) 40 MG capsule Take 40 mg by mouth 2 times daily.         metformin (GLUCOPHAGE) 500 MG tablet Take 500 mg by mouth 2 times daily (with meals) HOLD 48 HRS PRIOR TO SURGERY      ipratropium-albuterol (DUONEB) 0.5-2.5 (3) MG/3ML SOLN nebulizer solution Inhale 3 mLs into the lungs every 4 hours as needed for Shortness of Breath 360 mL 0     No current facility-administered medications for this visit. Allergies   Allergen Reactions    Propofol Anaphylaxis    Bee Venom      Health Maintenance   Topic Date Due    Diabetic microalbuminuria test  08/02/1962    DTaP/Tdap/Td vaccine (1 - Tdap) 08/02/1963    Shingles Vaccine (1 of 2 - 2 Dose Series) 05/31/2016    Diabetic foot exam  10/12/2016    Diabetic retinal exam  03/07/2017    Lipid screen  04/11/2017    A1C test (Diabetic or Prediabetic)  08/02/2017    Potassium monitoring  03/16/2018    Creatinine monitoring  03/16/2018    Flu vaccine (1) 09/01/2018    Colon cancer screen colonoscopy  03/16/2022    Pneumococcal low/med risk  Completed    AAA screen  Completed         Objective:     Physical Exam   Constitutional: He is oriented to person, place, and time. He appears well-developed and well-nourished. No distress. HENT:   Head: Normocephalic and atraumatic. Right Ear: External ear normal.   Left Ear: External ear normal.   Eyes: Conjunctivae are normal.   Neck: No JVD present. Cardiovascular: Normal rate, regular rhythm and normal heart sounds. Pulmonary/Chest: Effort normal and breath sounds normal. He has no wheezes. He has no rales. Musculoskeletal: He exhibits no edema or tenderness. Full active and passive range of motion of the right hip. Palpable tenderness in lower lumbosacral spine straight leg raise is negative. Deep tendon reflexes are normal, no sensory deficits, no motor power loss   Neurological: He is alert and oriented to person, place, and time. Skin: Skin is warm and dry. He is not diaphoretic. No pallor. /62   Pulse 71   Resp 16   Wt 234 lb (106.1 kg)   BMI 32.64 kg/m²       Impression/Plan:  1.  Lumbar radiculopathy      Requested Prescriptions      No prescriptions requested or ordered in this encounter     No orders of the defined types were placed in this encounter. Patient given educational materials - see patient instructions. Discussed use, benefit, and side effects of prescribed medications. All patient questions answered. Pt voiced understanding. Reviewed health maintenance. Patient agreed with treatment plan. Follow up as directed. **This report has been created using voice recognition software. It may contain minor errors which are inherent in voice recognition technology. **       Electronically signed by Naern Mccrary MD on 8/27/2018 at 9:01 PM

## 2018-09-13 ENCOUNTER — TELEPHONE (OUTPATIENT)
Dept: FAMILY MEDICINE CLINIC | Age: 73
End: 2018-09-13

## 2018-09-13 DIAGNOSIS — M54.41 ACUTE RIGHT-SIDED LOW BACK PAIN WITH RIGHT-SIDED SCIATICA: Primary | ICD-10-CM

## 2018-09-13 DIAGNOSIS — M25.551 RIGHT HIP PAIN: ICD-10-CM

## 2018-09-14 ENCOUNTER — HOSPITAL ENCOUNTER (OUTPATIENT)
Age: 73
Discharge: HOME | End: 2018-09-14
Payer: MEDICARE

## 2018-09-14 ENCOUNTER — HOSPITAL ENCOUNTER (OUTPATIENT)
Dept: GENERAL RADIOLOGY | Age: 73
Discharge: HOME | End: 2018-09-14
Payer: MEDICARE

## 2018-09-14 DIAGNOSIS — M54.41 ACUTE RIGHT-SIDED LOW BACK PAIN WITH RIGHT-SIDED SCIATICA: ICD-10-CM

## 2018-09-14 DIAGNOSIS — M25.551 RIGHT HIP PAIN: ICD-10-CM

## 2018-09-14 PROCEDURE — 72100 X-RAY EXAM L-S SPINE 2/3 VWS: CPT

## 2018-10-02 ENCOUNTER — CARE COORDINATION (OUTPATIENT)
Dept: CARE COORDINATION | Age: 73
End: 2018-10-02

## 2018-10-19 ENCOUNTER — CARE COORDINATION (OUTPATIENT)
Dept: CARE COORDINATION | Age: 73
End: 2018-10-19

## 2018-11-09 PROBLEM — J96.11 CHRONIC RESPIRATORY FAILURE WITH HYPOXIA (HCC): Status: ACTIVE | Noted: 2018-11-09

## 2018-11-27 ENCOUNTER — CARE COORDINATION (OUTPATIENT)
Dept: CARE COORDINATION | Age: 73
End: 2018-11-27

## 2018-12-17 NOTE — CARE COORDINATION
glipiZIDE (GLIPIZIDE XL) 10 MG extended release tablet Take 10 mg by mouth 2 times daily    Historical Provider, MD   sildenafil (VIAGRA) 100 MG tablet Take 100 mg by mouth as needed for Erectile Dysfunction    Historical Provider, MD   ipratropium-albuterol (DUONEB) 0.5-2.5 (3) MG/3ML SOLN nebulizer solution Inhale 3 mLs into the lungs every 4 hours as needed for Shortness of Breath 2/18/17   SHONNA Moreno CNP   ALPRAZolam [place]) 0.25 MG tablet Take 0.25 mg by mouth nightly as needed for Sleep. Historical Provider, MD   TRAVATAN Z 0.004 % SOLN ophthalmic solution Place 1 drop into both eyes nightly. 9/12/12   Historical Provider, MD   Insulin Lispro Prot & Lispro (HUMALOG MIX 75/25 SC) Inject 30 Units into the skin 2 times daily     Historical Provider, MD   quinapril (ACCUPRIL) 20 MG tablet Take 20 mg by mouth 2 times daily. Historical Provider, MD   esomeprazole (NEXIUM) 40 MG capsule Take 40 mg by mouth 2 times daily.       Historical Provider, MD   metformin (GLUCOPHAGE) 500 MG tablet Take 500 mg by mouth 2 times daily (with meals) HOLD 48 HRS PRIOR TO SURGERY    Historical Provider, MD       Future Appointments  Date Time Provider Nancy Espinoza   3/6/2019 1:30 PM SHNONA Miller CNP Pulm Med [address] [facility]

## 2019-01-17 ENCOUNTER — CARE COORDINATION (OUTPATIENT)
Dept: CARE COORDINATION | Age: 74
End: 2019-01-17

## 2019-02-27 ENCOUNTER — OFFICE VISIT (OUTPATIENT)
Dept: FAMILY MEDICINE CLINIC | Age: 74
End: 2019-02-27
Payer: MEDICARE

## 2019-02-27 VITALS
DIASTOLIC BLOOD PRESSURE: 68 MMHG | WEIGHT: 227.2 LBS | RESPIRATION RATE: 16 BRPM | TEMPERATURE: 97.8 F | BODY MASS INDEX: 31.69 KG/M2 | SYSTOLIC BLOOD PRESSURE: 130 MMHG | HEART RATE: 78 BPM

## 2019-02-27 DIAGNOSIS — J01.90 ACUTE BACTERIAL SINUSITIS: Primary | ICD-10-CM

## 2019-02-27 DIAGNOSIS — J96.11 CHRONIC RESPIRATORY FAILURE WITH HYPOXIA (HCC): ICD-10-CM

## 2019-02-27 DIAGNOSIS — B96.89 ACUTE BACTERIAL SINUSITIS: Primary | ICD-10-CM

## 2019-02-27 DIAGNOSIS — J43.2 CENTRILOBULAR EMPHYSEMA (HCC): ICD-10-CM

## 2019-02-27 DIAGNOSIS — E11.9 TYPE 2 DIABETES MELLITUS WITHOUT COMPLICATION, WITHOUT LONG-TERM CURRENT USE OF INSULIN (HCC): ICD-10-CM

## 2019-02-27 PROCEDURE — 2022F DILAT RTA XM EVC RTNOPTHY: CPT | Performed by: FAMILY MEDICINE

## 2019-02-27 PROCEDURE — 4040F PNEUMOC VAC/ADMIN/RCVD: CPT | Performed by: FAMILY MEDICINE

## 2019-02-27 PROCEDURE — G8482 FLU IMMUNIZE ORDER/ADMIN: HCPCS | Performed by: FAMILY MEDICINE

## 2019-02-27 PROCEDURE — G8926 SPIRO NO PERF OR DOC: HCPCS | Performed by: FAMILY MEDICINE

## 2019-02-27 PROCEDURE — 1123F ACP DISCUSS/DSCN MKR DOCD: CPT | Performed by: FAMILY MEDICINE

## 2019-02-27 PROCEDURE — 1036F TOBACCO NON-USER: CPT | Performed by: FAMILY MEDICINE

## 2019-02-27 PROCEDURE — 3017F COLORECTAL CA SCREEN DOC REV: CPT | Performed by: FAMILY MEDICINE

## 2019-02-27 PROCEDURE — 3023F SPIROM DOC REV: CPT | Performed by: FAMILY MEDICINE

## 2019-02-27 PROCEDURE — G8427 DOCREV CUR MEDS BY ELIG CLIN: HCPCS | Performed by: FAMILY MEDICINE

## 2019-02-27 PROCEDURE — 3046F HEMOGLOBIN A1C LEVEL >9.0%: CPT | Performed by: FAMILY MEDICINE

## 2019-02-27 PROCEDURE — G8417 CALC BMI ABV UP PARAM F/U: HCPCS | Performed by: FAMILY MEDICINE

## 2019-02-27 PROCEDURE — 1101F PT FALLS ASSESS-DOCD LE1/YR: CPT | Performed by: FAMILY MEDICINE

## 2019-02-27 PROCEDURE — 99213 OFFICE O/P EST LOW 20 MIN: CPT | Performed by: FAMILY MEDICINE

## 2019-02-27 RX ORDER — AZITHROMYCIN 500 MG/1
500 TABLET, FILM COATED ORAL DAILY
Qty: 3 TABLET | Refills: 0 | Status: SHIPPED | OUTPATIENT
Start: 2019-02-27 | End: 2019-06-07 | Stop reason: SDUPTHER

## 2019-02-27 ASSESSMENT — ENCOUNTER SYMPTOMS
SINUS PRESSURE: 1
WHEEZING: 0
NAUSEA: 0
SORE THROAT: 0
SINUS PAIN: 1
COUGH: 0
BACK PAIN: 0
SHORTNESS OF BREATH: 0
DIARRHEA: 0
CONSTIPATION: 0
CHOKING: 1
VOMITING: 0
RHINORRHEA: 1
ABDOMINAL PAIN: 0

## 2019-02-27 ASSESSMENT — PATIENT HEALTH QUESTIONNAIRE - PHQ9
1. LITTLE INTEREST OR PLEASURE IN DOING THINGS: 0
SUM OF ALL RESPONSES TO PHQ QUESTIONS 1-9: 0
SUM OF ALL RESPONSES TO PHQ9 QUESTIONS 1 & 2: 0
SUM OF ALL RESPONSES TO PHQ QUESTIONS 1-9: 0
2. FEELING DOWN, DEPRESSED OR HOPELESS: 0

## 2019-03-04 ENCOUNTER — CARE COORDINATION (OUTPATIENT)
Dept: CARE COORDINATION | Age: 74
End: 2019-03-04

## 2019-03-04 ASSESSMENT — ENCOUNTER SYMPTOMS: DYSPNEA ASSOCIATED WITH: EXERTION

## 2019-03-05 ENCOUNTER — TELEPHONE (OUTPATIENT)
Dept: FAMILY MEDICINE CLINIC | Age: 74
End: 2019-03-05

## 2019-03-21 ENCOUNTER — OFFICE VISIT (OUTPATIENT)
Dept: PULMONOLOGY | Age: 74
End: 2019-03-21
Payer: MEDICARE

## 2019-03-21 VITALS
HEIGHT: 71 IN | WEIGHT: 232 LBS | HEART RATE: 76 BPM | BODY MASS INDEX: 32.48 KG/M2 | OXYGEN SATURATION: 91 % | TEMPERATURE: 99.6 F | SYSTOLIC BLOOD PRESSURE: 134 MMHG | DIASTOLIC BLOOD PRESSURE: 62 MMHG

## 2019-03-21 DIAGNOSIS — J98.4 RESTRICTIVE LUNG DISEASE: ICD-10-CM

## 2019-03-21 DIAGNOSIS — J43.2 CENTRILOBULAR EMPHYSEMA (HCC): Primary | ICD-10-CM

## 2019-03-21 DIAGNOSIS — Z99.81 DEPENDENCE ON NOCTURNAL OXYGEN THERAPY: ICD-10-CM

## 2019-03-21 PROCEDURE — 99212 OFFICE O/P EST SF 10 MIN: CPT | Performed by: NURSE PRACTITIONER

## 2019-03-21 PROCEDURE — 1123F ACP DISCUSS/DSCN MKR DOCD: CPT | Performed by: NURSE PRACTITIONER

## 2019-03-21 PROCEDURE — 3023F SPIROM DOC REV: CPT | Performed by: NURSE PRACTITIONER

## 2019-03-21 PROCEDURE — G8482 FLU IMMUNIZE ORDER/ADMIN: HCPCS | Performed by: NURSE PRACTITIONER

## 2019-03-21 PROCEDURE — 3017F COLORECTAL CA SCREEN DOC REV: CPT | Performed by: NURSE PRACTITIONER

## 2019-03-21 PROCEDURE — G8926 SPIRO NO PERF OR DOC: HCPCS | Performed by: NURSE PRACTITIONER

## 2019-03-21 PROCEDURE — 1036F TOBACCO NON-USER: CPT | Performed by: NURSE PRACTITIONER

## 2019-03-21 PROCEDURE — 1101F PT FALLS ASSESS-DOCD LE1/YR: CPT | Performed by: NURSE PRACTITIONER

## 2019-03-21 PROCEDURE — G8417 CALC BMI ABV UP PARAM F/U: HCPCS | Performed by: NURSE PRACTITIONER

## 2019-03-21 PROCEDURE — G8427 DOCREV CUR MEDS BY ELIG CLIN: HCPCS | Performed by: NURSE PRACTITIONER

## 2019-03-21 PROCEDURE — 4040F PNEUMOC VAC/ADMIN/RCVD: CPT | Performed by: NURSE PRACTITIONER

## 2019-03-26 ENCOUNTER — CARE COORDINATION (OUTPATIENT)
Dept: CARE COORDINATION | Age: 74
End: 2019-03-26

## 2019-05-06 ENCOUNTER — OFFICE VISIT (OUTPATIENT)
Dept: FAMILY MEDICINE CLINIC | Age: 74
End: 2019-05-06
Payer: MEDICARE

## 2019-05-06 VITALS
HEART RATE: 68 BPM | BODY MASS INDEX: 31.16 KG/M2 | WEIGHT: 223.4 LBS | SYSTOLIC BLOOD PRESSURE: 136 MMHG | DIASTOLIC BLOOD PRESSURE: 72 MMHG | RESPIRATION RATE: 16 BRPM | TEMPERATURE: 98.5 F

## 2019-05-06 DIAGNOSIS — M54.16 LUMBAR RADICULOPATHY: Primary | ICD-10-CM

## 2019-05-06 PROCEDURE — 99213 OFFICE O/P EST LOW 20 MIN: CPT | Performed by: FAMILY MEDICINE

## 2019-05-06 PROCEDURE — G8417 CALC BMI ABV UP PARAM F/U: HCPCS | Performed by: FAMILY MEDICINE

## 2019-05-06 PROCEDURE — 96372 THER/PROPH/DIAG INJ SC/IM: CPT | Performed by: FAMILY MEDICINE

## 2019-05-06 PROCEDURE — 1123F ACP DISCUSS/DSCN MKR DOCD: CPT | Performed by: FAMILY MEDICINE

## 2019-05-06 PROCEDURE — 3017F COLORECTAL CA SCREEN DOC REV: CPT | Performed by: FAMILY MEDICINE

## 2019-05-06 PROCEDURE — 1036F TOBACCO NON-USER: CPT | Performed by: FAMILY MEDICINE

## 2019-05-06 PROCEDURE — 4040F PNEUMOC VAC/ADMIN/RCVD: CPT | Performed by: FAMILY MEDICINE

## 2019-05-06 PROCEDURE — G8427 DOCREV CUR MEDS BY ELIG CLIN: HCPCS | Performed by: FAMILY MEDICINE

## 2019-05-06 RX ORDER — METHYLPREDNISOLONE ACETATE 80 MG/ML
160 INJECTION, SUSPENSION INTRA-ARTICULAR; INTRALESIONAL; INTRAMUSCULAR; SOFT TISSUE ONCE
Status: COMPLETED | OUTPATIENT
Start: 2019-05-06 | End: 2019-05-06

## 2019-05-06 RX ADMIN — METHYLPREDNISOLONE ACETATE 160 MG: 80 INJECTION, SUSPENSION INTRA-ARTICULAR; INTRALESIONAL; INTRAMUSCULAR; SOFT TISSUE at 15:18

## 2019-05-06 ASSESSMENT — ENCOUNTER SYMPTOMS
NAUSEA: 0
SHORTNESS OF BREATH: 0
COUGH: 0
RHINORRHEA: 0
ABDOMINAL DISTENTION: 0
SORE THROAT: 0
BACK PAIN: 1
DIARRHEA: 0
CONSTIPATION: 0
SINUS PRESSURE: 0
EYE PAIN: 0
ABDOMINAL PAIN: 0

## 2019-05-07 NOTE — PROGRESS NOTES
BACK    HERNIA REPAIR      OTHER SURGICAL HISTORY  2013    RIGHT POST. CERVICAL LAMINOTOMIES AND FORAMINOTOMIES    PANCREAS SURGERY      partial removal     PARATHYROID GLAND SURGERY      PILONIDAL CYST EXCISION      SPLENECTOMY, TOTAL       Family History   Problem Relation Age of Onset    Cancer Father         zollinger shay syndrome    Cancer Son         carcinoid cancer     Social History     Tobacco Use    Smoking status: Former Smoker     Packs/day: 2.00     Years: 20.00     Pack years: 40.00     Types: Cigarettes     Last attempt to quit: 1991     Years since quittin.0    Smokeless tobacco: Never Used   Substance Use Topics    Alcohol use: Yes     Alcohol/week: 0.0 oz     Comment: rarely      Current Outpatient Medications   Medication Sig Dispense Refill    fluticasone-salmeterol (ADVAIR DISKUS) 250-50 MCG/DOSE AEPB Inhale 1 puff into the lungs 2 times daily 3 each 3    atorvastatin (LIPITOR) 10 MG tablet Take 1 tablet by mouth daily      doxazosin (CARDURA) 8 MG tablet Take 1 tablet by mouth daily      albuterol sulfate (PROAIR RESPICLICK) 114 (90 Base) MCG/ACT aerosol powder inhalation Inhale 2 puffs into the lungs every 6 hours as needed for Wheezing or Shortness of Breath 3 Inhaler 3    OXYGEN Inhale into the lungs 2 L qhs  per Petrona Tobias      glipiZIDE (GLIPIZIDE XL) 10 MG extended release tablet Take 10 mg by mouth 2 times daily      sildenafil (VIAGRA) 100 MG tablet Take 100 mg by mouth as needed for Erectile Dysfunction      ipratropium-albuterol (DUONEB) 0.5-2.5 (3) MG/3ML SOLN nebulizer solution Inhale 3 mLs into the lungs every 4 hours as needed for Shortness of Breath 360 mL 0    ALPRAZolam (XANAX) 0.25 MG tablet Take 0.25 mg by mouth nightly as needed for Sleep.  TRAVATAN Z 0.004 % SOLN ophthalmic solution Place 1 drop into both eyes nightly.       Insulin Lispro Prot & Lispro (HUMALOG MIX 75/25 SC) Inject 30 Units into the skin 2 times daily      

## 2019-06-04 ENCOUNTER — CARE COORDINATION (OUTPATIENT)
Dept: CASE MANAGEMENT | Age: 74
End: 2019-06-04

## 2019-06-07 ENCOUNTER — OFFICE VISIT (OUTPATIENT)
Dept: FAMILY MEDICINE CLINIC | Age: 74
End: 2019-06-07
Payer: MEDICARE

## 2019-06-07 VITALS
HEART RATE: 66 BPM | WEIGHT: 219.4 LBS | BODY MASS INDEX: 30.6 KG/M2 | RESPIRATION RATE: 16 BRPM | TEMPERATURE: 97.9 F | SYSTOLIC BLOOD PRESSURE: 130 MMHG | DIASTOLIC BLOOD PRESSURE: 58 MMHG

## 2019-06-07 DIAGNOSIS — J40 BRONCHITIS: Primary | ICD-10-CM

## 2019-06-07 PROCEDURE — G8417 CALC BMI ABV UP PARAM F/U: HCPCS | Performed by: FAMILY MEDICINE

## 2019-06-07 PROCEDURE — 99213 OFFICE O/P EST LOW 20 MIN: CPT | Performed by: FAMILY MEDICINE

## 2019-06-07 PROCEDURE — 4040F PNEUMOC VAC/ADMIN/RCVD: CPT | Performed by: FAMILY MEDICINE

## 2019-06-07 PROCEDURE — G8427 DOCREV CUR MEDS BY ELIG CLIN: HCPCS | Performed by: FAMILY MEDICINE

## 2019-06-07 PROCEDURE — 1036F TOBACCO NON-USER: CPT | Performed by: FAMILY MEDICINE

## 2019-06-07 PROCEDURE — 1123F ACP DISCUSS/DSCN MKR DOCD: CPT | Performed by: FAMILY MEDICINE

## 2019-06-07 PROCEDURE — 3017F COLORECTAL CA SCREEN DOC REV: CPT | Performed by: FAMILY MEDICINE

## 2019-06-07 RX ORDER — AZITHROMYCIN 500 MG/1
500 TABLET, FILM COATED ORAL DAILY
Qty: 3 TABLET | Refills: 0 | Status: SHIPPED | OUTPATIENT
Start: 2019-06-07 | End: 2019-06-10

## 2019-06-12 ENCOUNTER — TELEPHONE (OUTPATIENT)
Dept: FAMILY MEDICINE CLINIC | Age: 74
End: 2019-06-12

## 2019-06-12 RX ORDER — PREDNISONE 10 MG/1
TABLET ORAL
Qty: 30 TABLET | Refills: 0 | Status: SHIPPED | OUTPATIENT
Start: 2019-06-12 | End: 2019-06-22

## 2019-06-12 ASSESSMENT — ENCOUNTER SYMPTOMS
ABDOMINAL PAIN: 0
SHORTNESS OF BREATH: 0
NAUSEA: 0
SINUS PRESSURE: 0
CONSTIPATION: 0
DIARRHEA: 0
COUGH: 1
EYE PAIN: 0
SORE THROAT: 0
RHINORRHEA: 0
ABDOMINAL DISTENTION: 0

## 2019-06-12 NOTE — TELEPHONE ENCOUNTER
Patient was seen last week by Dr Bailey Pablo for cold symptoms. He is calling in today because he still has a deep cough that is not improving. He said sometimes it is productive but he was not sure coloring of mucous. But he said it is so bad that he has not slept in 5 days because of the coughing. His other cold symptoms are better, it is just the cough now. [facility] [address] [facility] [address], please call him to advise.

## 2019-06-13 NOTE — PROGRESS NOTES
[address] [address]  Dept: [phone]  Dept Fax: [phone]  Loc: [phone]  PROGRESS NOTE      VisitDate: 6/7/2019    Sandi Rodriguez is a 75 y.o. male who presents today for:     Chief Complaint   Patient presents with    Cough     dry cough, ST since yesterday         Subjective:  HPI  And cough sore throat congestion symptoms for several days. He is asplenic    Review of Systems   Constitutional: Negative for appetite change and fever. HENT: Positive for congestion. Negative for ear pain, postnasal drip, rhinorrhea, sinus pressure and sore throat. Eyes: Negative for pain and visual disturbance. Respiratory: Positive for cough. Negative for shortness of breath. Cardiovascular: Negative for chest pain. Gastrointestinal: Negative for abdominal distention, abdominal pain, constipation, diarrhea and nausea. Genitourinary: Negative for dysuria, frequency and urgency. Musculoskeletal: Negative for arthralgias. Skin: Negative for rash. Neurological: Negative for dizziness. Past Medical History:   Diagnosis Date    Arthritis     Cancer ([facility] [address] 1990    Spelenectomy, cholycystectomy,partial pancrease, partial stomache, lymphnodes    COPD (chronic obstructive pulmonary disease) (HCC)     Hypertension     Nausea & vomiting     Type II or unspecified type diabetes mellitus without mention of complication, not stated as uncontrolled       Past Surgical History:   Procedure Laterality Date    CHOLECYSTECTOMY      COLONOSCOPY  3/16/12    [facility]    CYST REMOVAL      FROM NECK AND BACK    HERNIA REPAIR      OTHER SURGICAL HISTORY  1/25/2013    RIGHT POST.  CERVICAL LAMINOTOMIES AND FORAMINOTOMIES    PANCREAS SURGERY      partial removal     PARATHYROID GLAND SURGERY      PILONIDAL CYST EXCISION      SPLENECTOMY, TOTAL       Family History   Problem Relation Age of Onset    Cancer Father zollinger shay syndrome    Cancer Son         carcinoid cancer     Social History     Tobacco Use    Smoking status: Former Smoker     Packs/day: 2.00     Years: 20.00     Pack years: 40.00     Types: Cigarettes     Last attempt to quit: 1991     Years since quittin.1    Smokeless tobacco: Never Used   Substance Use Topics    Alcohol use: Yes     Alcohol/week: 0.0 oz     Comment: rarely      Current Outpatient Medications   Medication Sig Dispense Refill    fluticasone-salmeterol (ADVAIR DISKUS) 250-50 MCG/DOSE AEPB Inhale 1 puff into the lungs 2 times daily 3 each 3    atorvastatin (LIPITOR) 10 MG tablet Take 1 tablet by mouth daily      doxazosin (CARDURA) 8 MG tablet Take 1 tablet by mouth daily      albuterol sulfate (PROAIR RESPICLICK) 995 (90 Base) MCG/ACT aerosol powder inhalation Inhale 2 puffs into the lungs every 6 hours as needed for Wheezing or Shortness of Breath 3 Inhaler 3    OXYGEN Inhale into the lungs 2 L qhs  per [place]      glipiZIDE (GLIPIZIDE XL) 10 MG extended release tablet Take 10 mg by mouth 2 times daily      sildenafil (VIAGRA) 100 MG tablet Take 100 mg by mouth as needed for Erectile Dysfunction      ipratropium-albuterol (DUONEB) 0.5-2.5 (3) MG/3ML SOLN nebulizer solution Inhale 3 mLs into the lungs every 4 hours as needed for Shortness of Breath 360 mL 0    ALPRAZolam (XANAX) 0.25 MG tablet Take 0.25 mg by mouth nightly as needed for Sleep.  TRAVATAN Z 0.004 % SOLN ophthalmic solution Place 1 drop into both eyes nightly.  Insulin Lispro Prot & Lispro (HUMALOG MIX 75/25 SC) Inject 30 Units into the skin 2 times daily       quinapril (ACCUPRIL) 20 MG tablet Take 20 mg by mouth 2 times daily.  esomeprazole (NEXIUM) 40 MG capsule Take 40 mg by mouth 2 times daily.         metformin (GLUCOPHAGE) 500 MG tablet Take 500 mg by mouth 2 times daily (with meals) HOLD 48 HRS PRIOR TO SURGERY      predniSONE (DELTASONE) 10 MG tablet 4 po qd for 3 days, then 3 po qd for 3 days, then 2 po qd for 3 days, then 1 po qd for 3 days 30 tablet 0     No current facility-administered medications for this visit. Allergies   Allergen Reactions    Propofol Anaphylaxis    Bee Venom      Health Maintenance   Topic Date Due    Diabetic microalbuminuria test  08/02/1962    DTaP/Tdap/Td vaccine (1 - Tdap) 08/02/1963    Shingles Vaccine (2 of 3) 05/26/2016    Diabetic foot exam  10/12/2016    Diabetic retinal exam  03/07/2017    Lipid screen  04/11/2017    Potassium monitoring  03/16/2018    Creatinine monitoring  03/16/2018    A1C test (Diabetic or Prediabetic)  08/30/2019    Colon cancer screen colonoscopy  03/16/2022    Flu vaccine  Completed    Pneumococcal 65+ years Vaccine  Completed    AAA screen  Completed         Objective:     Physical Exam   Constitutional: He is oriented to person, place, and time. He appears well-developed and well-nourished. No distress. HENT:   Head: Normocephalic and atraumatic. Right Ear: External ear normal.   Left Ear: External ear normal.   Eyes: Conjunctivae are normal.   Neck: No JVD present. Cardiovascular: Normal rate, regular rhythm and normal heart sounds. Pulmonary/Chest: Effort normal. He has no wheezes. He has no rales. Lateral rhonchi   Musculoskeletal: He exhibits no edema or tenderness. Neurological: He is alert and oriented to person, place, and time. Skin: Skin is warm and dry. He is not diaphoretic. No pallor. BP (!) 130/58 (Site: Left Upper Arm)   Pulse 66   Temp 97.9 °F (36.6 °C) (Oral)   Resp 16   Wt 219 lb 6.4 oz (99.5 kg)   BMI 30.60 kg/m²       Impression/Plan:  1. Bronchitis      Requested Prescriptions     Signed Prescriptions Disp Refills    azithromycin (ZITHROMAX) 500 MG tablet 3 tablet 0     Sig: Take 1 tablet by mouth daily for 3 days     No orders of the defined types were placed in this encounter. Patient giveneducational materials - see patient instructions. Discussed use, benefit, and side effects of prescribed medications. All patient questions answered. Pt voiced understanding. Reviewed health maintenance. Patient agreedwith treatment plan. Follow up as directed. **This report has been created using voice recognition software. It may contain minor errorswhich are inherent in voice recognition technology. **       Electronically signed by Cheryle Base, MD on 6/12/2019 at 10:05 PM

## 2019-06-14 ENCOUNTER — CARE COORDINATION (OUTPATIENT)
Dept: CASE MANAGEMENT | Age: 74
End: 2019-06-14

## 2019-06-14 NOTE — CARE COORDINATION
Name: Aparna Knox    ### Patient Details  YOB: 1945  MRN: [id number]    ### Encounter Details  Encounter ID: [id number]  Arrival Date: N/A  Discharge Date: N/A    ### Related interaction  COPD-Diabetes High Touch UA (Welcome Call) ([email or web address])    ### Required Interventions and Feedback     [facility] Update         *Patient Status changed in [facility] to[de-identified]     Patient Declined (selected by [place] on 06/14/2019 12:04 PM EDT)    Explain why patient opted out[de-identified]     UTC (edited by Ash Meadows on 06/14/2019 12:04 PM EDT)   Nicki CINTRONN RN.          Call Status              *Call Status:     Patient Not Reached After 1st Attempt - Left Voicemail (edited by Ash Meadows on 06/14/2019 12:04 PM EDT)

## 2019-06-15 ENCOUNTER — CARE COORDINATION (OUTPATIENT)
Dept: CASE MANAGEMENT | Age: 74
End: 2019-06-15

## 2019-12-12 ENCOUNTER — TELEPHONE (OUTPATIENT)
Dept: FAMILY MEDICINE CLINIC | Age: 74
End: 2019-12-12

## 2020-01-13 ENCOUNTER — OFFICE VISIT (OUTPATIENT)
Dept: FAMILY MEDICINE CLINIC | Age: 75
End: 2020-01-13
Payer: MEDICARE

## 2020-01-13 VITALS
RESPIRATION RATE: 16 BRPM | WEIGHT: 221 LBS | HEART RATE: 69 BPM | BODY MASS INDEX: 30.82 KG/M2 | SYSTOLIC BLOOD PRESSURE: 138 MMHG | DIASTOLIC BLOOD PRESSURE: 60 MMHG | OXYGEN SATURATION: 98 % | TEMPERATURE: 98.5 F

## 2020-01-13 PROCEDURE — 1036F TOBACCO NON-USER: CPT | Performed by: FAMILY MEDICINE

## 2020-01-13 PROCEDURE — 1123F ACP DISCUSS/DSCN MKR DOCD: CPT | Performed by: FAMILY MEDICINE

## 2020-01-13 PROCEDURE — 3017F COLORECTAL CA SCREEN DOC REV: CPT | Performed by: FAMILY MEDICINE

## 2020-01-13 PROCEDURE — 99213 OFFICE O/P EST LOW 20 MIN: CPT | Performed by: FAMILY MEDICINE

## 2020-01-13 PROCEDURE — G8482 FLU IMMUNIZE ORDER/ADMIN: HCPCS | Performed by: FAMILY MEDICINE

## 2020-01-13 PROCEDURE — G8427 DOCREV CUR MEDS BY ELIG CLIN: HCPCS | Performed by: FAMILY MEDICINE

## 2020-01-13 PROCEDURE — G8417 CALC BMI ABV UP PARAM F/U: HCPCS | Performed by: FAMILY MEDICINE

## 2020-01-13 PROCEDURE — 4040F PNEUMOC VAC/ADMIN/RCVD: CPT | Performed by: FAMILY MEDICINE

## 2020-01-13 RX ORDER — AZITHROMYCIN 500 MG/1
500 TABLET, FILM COATED ORAL DAILY
Qty: 3 TABLET | Refills: 0 | Status: SHIPPED | OUTPATIENT
Start: 2020-01-13 | End: 2020-01-16

## 2020-01-16 ENCOUNTER — TELEPHONE (OUTPATIENT)
Dept: FAMILY MEDICINE CLINIC | Age: 75
End: 2020-01-16

## 2020-01-16 RX ORDER — CEFUROXIME AXETIL 250 MG/1
250 TABLET ORAL 2 TIMES DAILY
Qty: 20 TABLET | Refills: 0 | Status: SHIPPED | OUTPATIENT
Start: 2020-01-16 | End: 2020-01-26

## 2020-01-19 ASSESSMENT — ENCOUNTER SYMPTOMS
DIARRHEA: 0
CHEST TIGHTNESS: 1
WHEEZING: 1
SINUS PRESSURE: 0
ABDOMINAL DISTENTION: 0
RHINORRHEA: 0
SORE THROAT: 0
ABDOMINAL PAIN: 0
EYE PAIN: 0
SHORTNESS OF BREATH: 1
COUGH: 1
NAUSEA: 0
CONSTIPATION: 0

## 2020-01-19 NOTE — PROGRESS NOTES
[address] [place] [facility] [address]  Dept: [phone]  Dept Fax: [phone]  Loc: [phone]  PROGRESS NOTE      VisitDate: 1/13/2020    Brenda Rosales is a 75 y.o. male who presents today for:     Chief Complaint   Patient presents with    URI     c/o dry cough, chills, sinus drainage. Started yesterday. Subjective:  HPI  Patient comes in with 2-week history of chest congestion coughing wheezing shortness of breath with exercise little response from his inhaler    Review of Systems   Constitutional: Negative for appetite change and fever. HENT: Positive for congestion. Negative for ear pain, postnasal drip, rhinorrhea, sinus pressure and sore throat. Eyes: Negative for pain and visual disturbance. Respiratory: Positive for cough, chest tightness, shortness of breath and wheezing. Cardiovascular: Negative for chest pain. Gastrointestinal: Negative for abdominal distention, abdominal pain, constipation, diarrhea and nausea. Genitourinary: Negative for dysuria, frequency and urgency. Musculoskeletal: Negative for arthralgias. Skin: Negative for rash. Neurological: Negative for dizziness. Past Medical History:   Diagnosis Date    Arthritis     Cancer ([facility] [address] 1990    Spelenectomy, cholycystectomy,partial pancrease, partial stomache, lymphnodes    COPD (chronic obstructive pulmonary disease) (HCC)     Hypertension     Nausea & vomiting     Type II or unspecified type diabetes mellitus without mention of complication, not stated as uncontrolled       Past Surgical History:   Procedure Laterality Date    CHOLECYSTECTOMY      [address]ONOSCOPY  3/16/12    [address]    CYST REMOVAL      FROM NECK AND BACK    HERNIA REPAIR      OTHER SURGICAL HISTORY  1/25/2013    RIGHT POST.  CERVICAL LAMINOTOMIES AND FORAMINOTOMIES    PANCREAS SURGERY      partial removal     PARATHYROID GLAND SURGERY      PILONIDAL CYST EXCISION      SPLENECTOMY, TOTAL       Family History   Problem Relation Age of Onset    Cancer Father         zollinger shay syndrome    Cancer Son         carcinoid cancer     Social History     Tobacco Use    Smoking status: Former Smoker     Packs/day: 2.00     Years: 20.00     Pack years: 40.00     Types: Cigarettes     Last attempt to quit: 1991     Years since quittin.7    Smokeless tobacco: Never Used   Substance Use Topics    Alcohol use: Yes     Alcohol/week: 0.0 standard drinks     Comment: rarely      Current Outpatient Medications   Medication Sig Dispense Refill    fluticasone-salmeterol (ADVAIR DISKUS) 250-50 MCG/DOSE AEPB Inhale 1 puff into the lungs 2 times daily 3 each 3    atorvastatin (LIPITOR) 10 MG tablet Take 1 tablet by mouth daily      doxazosin (CARDURA) 8 MG tablet Take 1 tablet by mouth daily      albuterol sulfate (PROAIR RESPICLICK) 616 (90 Base) MCG/ACT aerosol powder inhalation Inhale 2 puffs into the lungs every 6 hours as needed for Wheezing or Shortness of Breath 3 Inhaler 3    OXYGEN Inhale into the lungs 2 L qhs  per [place] [facility]      glipiZIDE (GLIPIZIDE XL) 10 MG extended release tablet Take 10 mg by mouth 2 times daily      sildenafil (VIAGRA) 100 MG tablet Take 100 mg by mouth as needed for Erectile Dysfunction      ipratropium-albuterol (DUONEB) 0.5-2.5 (3) MG/3ML SOLN nebulizer solution Inhale 3 mLs into the lungs every 4 hours as needed for Shortness of Breath 360 mL 0    ALPRAZolam (XANAX) 0.25 MG tablet Take 0.25 mg by mouth nightly as needed for Sleep.  TRAVATAN Z 0.004 % SOLN ophthalmic solution Place 1 drop into both eyes nightly.  Insulin Lispro Prot & Lispro (HUMALOG MIX 75/25 SC) Inject 30 Units into the skin 2 times daily       quinapril (ACCUPRIL) 20 MG tablet Take 20 mg by mouth 2 times daily.  esomeprazole (NEXIUM) 40 MG capsule Take 40 mg by mouth 2 times daily.         metformin (GLUCOPHAGE) 500 MG tablet Take 500 mg (ZITHROMAX) 500 MG tablet 3 tablet 0     Sig: Take 1 tablet by mouth daily for 3 days     No orders of the defined types were placed in this encounter. Patient giveneducational materials - see patient instructions. Discussed use, benefit, and side effects of prescribed medications. All patient questions answered. Pt voiced understanding. Reviewed health maintenance. Patient agreedwith treatment plan. Follow up as directed. **This report has been created using voice recognition software. It may contain minor errorswhich are inherent in voice recognition technology. **       Electronically signed by Petar Carvajal MD on 1/19/2020 at 12:49 PM

## 2020-01-20 RX ORDER — IPRATROPIUM BROMIDE AND ALBUTEROL SULFATE 2.5; .5 MG/3ML; MG/3ML
1 SOLUTION RESPIRATORY (INHALATION) EVERY 4 HOURS PRN
Qty: 360 ML | Refills: 0 | Status: SHIPPED | OUTPATIENT
Start: 2020-01-20 | End: 2021-12-01 | Stop reason: ALTCHOICE

## 2020-01-20 NOTE — TELEPHONE ENCOUNTER
Anam Jack called requesting a refill on the following medications:  Requested Prescriptions     Pending Prescriptions Disp Refills    ipratropium-albuterol (DUONEB) 0.5-2.5 (3) MG/3ML SOLN nebulizer solution 360 mL 0     Sig: Inhale 3 mLs into the lungs every 4 hours as needed for Shortness of Breath     Pharmacy verified:  Isabela Thomason      Date of last visit: 1/13/20  Date of next visit (if applicable): Visit date not found

## 2020-01-24 ENCOUNTER — HOSPITAL ENCOUNTER (OUTPATIENT)
Dept: GENERAL RADIOLOGY | Age: 75
Discharge: HOME | End: 2020-01-24
Payer: MEDICARE

## 2020-01-24 ENCOUNTER — OFFICE VISIT (OUTPATIENT)
Dept: FAMILY MEDICINE CLINIC | Age: 75
End: 2020-01-24
Payer: MEDICARE

## 2020-01-24 ENCOUNTER — HOSPITAL ENCOUNTER (OUTPATIENT)
Age: 75
Discharge: HOME | End: 2020-01-24
Payer: MEDICARE

## 2020-01-24 VITALS
RESPIRATION RATE: 16 BRPM | SYSTOLIC BLOOD PRESSURE: 122 MMHG | TEMPERATURE: 97.9 F | DIASTOLIC BLOOD PRESSURE: 52 MMHG | HEART RATE: 82 BPM | BODY MASS INDEX: 31.16 KG/M2 | WEIGHT: 223.4 LBS

## 2020-01-24 PROCEDURE — 3017F COLORECTAL CA SCREEN DOC REV: CPT | Performed by: FAMILY MEDICINE

## 2020-01-24 PROCEDURE — G8417 CALC BMI ABV UP PARAM F/U: HCPCS | Performed by: FAMILY MEDICINE

## 2020-01-24 PROCEDURE — 71046 X-RAY EXAM CHEST 2 VIEWS: CPT

## 2020-01-24 PROCEDURE — G8427 DOCREV CUR MEDS BY ELIG CLIN: HCPCS | Performed by: FAMILY MEDICINE

## 2020-01-24 PROCEDURE — 1123F ACP DISCUSS/DSCN MKR DOCD: CPT | Performed by: FAMILY MEDICINE

## 2020-01-24 PROCEDURE — G8482 FLU IMMUNIZE ORDER/ADMIN: HCPCS | Performed by: FAMILY MEDICINE

## 2020-01-24 PROCEDURE — 99213 OFFICE O/P EST LOW 20 MIN: CPT | Performed by: FAMILY MEDICINE

## 2020-01-24 PROCEDURE — 1036F TOBACCO NON-USER: CPT | Performed by: FAMILY MEDICINE

## 2020-01-24 PROCEDURE — 96372 THER/PROPH/DIAG INJ SC/IM: CPT | Performed by: FAMILY MEDICINE

## 2020-01-24 PROCEDURE — 4040F PNEUMOC VAC/ADMIN/RCVD: CPT | Performed by: FAMILY MEDICINE

## 2020-01-24 RX ORDER — HYDROCHLOROTHIAZIDE 12.5 MG/1
TABLET ORAL
COMMUNITY
Start: 2020-01-14 | End: 2020-07-21 | Stop reason: ALTCHOICE

## 2020-01-24 RX ORDER — METHYLPREDNISOLONE ACETATE 80 MG/ML
160 INJECTION, SUSPENSION INTRA-ARTICULAR; INTRALESIONAL; INTRAMUSCULAR; SOFT TISSUE ONCE
Status: COMPLETED | OUTPATIENT
Start: 2020-01-24 | End: 2020-01-24

## 2020-01-24 RX ORDER — LEVOFLOXACIN 500 MG/1
500 TABLET, FILM COATED ORAL DAILY
Qty: 10 TABLET | Refills: 0 | Status: SHIPPED | OUTPATIENT
Start: 2020-01-24 | End: 2020-02-03

## 2020-01-24 RX ADMIN — METHYLPREDNISOLONE ACETATE 160 MG: 80 INJECTION, SUSPENSION INTRA-ARTICULAR; INTRALESIONAL; INTRAMUSCULAR; SOFT TISSUE at 09:59

## 2020-01-24 SDOH — ECONOMIC STABILITY: FOOD INSECURITY: WITHIN THE PAST 12 MONTHS, YOU WORRIED THAT YOUR FOOD WOULD RUN OUT BEFORE YOU GOT MONEY TO BUY MORE.: NEVER TRUE

## 2020-01-24 SDOH — ECONOMIC STABILITY: INCOME INSECURITY: HOW HARD IS IT FOR YOU TO PAY FOR THE VERY BASICS LIKE FOOD, HOUSING, MEDICAL CARE, AND HEATING?: NOT HARD AT ALL

## 2020-01-24 SDOH — ECONOMIC STABILITY: TRANSPORTATION INSECURITY
IN THE PAST 12 MONTHS, HAS THE LACK OF TRANSPORTATION KEPT YOU FROM MEDICAL APPOINTMENTS OR FROM GETTING MEDICATIONS?: NO

## 2020-01-24 SDOH — ECONOMIC STABILITY: TRANSPORTATION INSECURITY
IN THE PAST 12 MONTHS, HAS LACK OF TRANSPORTATION KEPT YOU FROM MEETINGS, WORK, OR FROM GETTING THINGS NEEDED FOR DAILY LIVING?: NO

## 2020-01-24 SDOH — ECONOMIC STABILITY: FOOD INSECURITY: WITHIN THE PAST 12 MONTHS, THE FOOD YOU BOUGHT JUST DIDN'T LAST AND YOU DIDN'T HAVE MONEY TO GET MORE.: NEVER TRUE

## 2020-01-24 ASSESSMENT — PATIENT HEALTH QUESTIONNAIRE - PHQ9
1. LITTLE INTEREST OR PLEASURE IN DOING THINGS: 0
SUM OF ALL RESPONSES TO PHQ QUESTIONS 1-9: 0
2. FEELING DOWN, DEPRESSED OR HOPELESS: 0
SUM OF ALL RESPONSES TO PHQ QUESTIONS 1-9: 0
SUM OF ALL RESPONSES TO PHQ9 QUESTIONS 1 & 2: 0

## 2020-01-24 NOTE — PROGRESS NOTES
Administrations This Visit     methylPREDNISolone acetate (DEPO-MEDROL) injection 160 mg     Admin Date  01/24/2020  09:59 Action  Given Dose  160 mg Route  Intramuscular Site  Dorsogluteal Right Administered By  Ricky Lindo RN    Ordering Provider:  Kia Linda MD    ND:  9236-1719-85    Lot#:  [id number]    :  [employer]     Patient Supplied?:  No    Comments:  exp 5/2021

## 2020-01-26 ASSESSMENT — ENCOUNTER SYMPTOMS
RHINORRHEA: 0
COUGH: 1
NAUSEA: 0
ABDOMINAL PAIN: 0
WHEEZING: 1
ABDOMINAL DISTENTION: 0
SORE THROAT: 0
SINUS PRESSURE: 0
DIARRHEA: 0
EYE PAIN: 0
SHORTNESS OF BREATH: 1
CONSTIPATION: 0

## 2020-04-30 ENCOUNTER — VIRTUAL VISIT (OUTPATIENT)
Dept: PULMONOLOGY | Age: 75
End: 2020-04-30
Payer: MEDICARE

## 2020-04-30 PROCEDURE — 4040F PNEUMOC VAC/ADMIN/RCVD: CPT | Performed by: NURSE PRACTITIONER

## 2020-04-30 PROCEDURE — G8417 CALC BMI ABV UP PARAM F/U: HCPCS | Performed by: NURSE PRACTITIONER

## 2020-04-30 PROCEDURE — 3023F SPIROM DOC REV: CPT | Performed by: NURSE PRACTITIONER

## 2020-04-30 PROCEDURE — 1036F TOBACCO NON-USER: CPT | Performed by: NURSE PRACTITIONER

## 2020-04-30 PROCEDURE — 3017F COLORECTAL CA SCREEN DOC REV: CPT | Performed by: NURSE PRACTITIONER

## 2020-04-30 PROCEDURE — G8926 SPIRO NO PERF OR DOC: HCPCS | Performed by: NURSE PRACTITIONER

## 2020-04-30 PROCEDURE — G8427 DOCREV CUR MEDS BY ELIG CLIN: HCPCS | Performed by: NURSE PRACTITIONER

## 2020-04-30 PROCEDURE — 99213 OFFICE O/P EST LOW 20 MIN: CPT | Performed by: NURSE PRACTITIONER

## 2020-04-30 PROCEDURE — 1123F ACP DISCUSS/DSCN MKR DOCD: CPT | Performed by: NURSE PRACTITIONER

## 2020-04-30 NOTE — PROGRESS NOTES
palsy         Skin:        [x] No significant exanthematous lesions or discoloration noted on facial skin             Psychiatric:       [x] Normal Affect [x] No Hallucinations     Test results   Lung Nodule Screening     [] Qualifies    [x]Does not qualify   [] Declined    [] Completed  No current testing    1/24/20       CLINICAL INFORMATION: Pneumonia of right middle lobe due to infectious organism ([facility] [address] Bronchitis.       COMPARISON: Multiple previous most recent 5/2/2017       TECHNIQUE: PA and lateral views the chest.       FINDINGS: Heart size appears upper limit of normal.        No significant pleural effusions.       Mild scarring is favored at each lung base. No definite acute infiltrate.               Impression   No definite acute infiltrate.                      Assessment      Diagnosis Orders   1. Mixed restrictive and obstructive lung disease ([facility] [address]     2. Centrilobular emphysema ([facility] [address]     3. Nocturnal oxygen desaturation           Plan   Continue Advair and GUERRERO PRN. Continue 02 at 2 Liters with sleep. He is UTD with vaccines. Does not qualify for CT Lung screening nor Pulmonary Rehab. Repeat Spirometry with symptom change. Will see Rylee Brownlee back in: 1 year. Rylee Brownlee is a 75 y.o. male being evaluated by a Virtual Visit/Doxy.me (video visit) encounter to address concerns as mentioned above. A caregiver was present when appropriate. Due to this being a TeleHealth encounter (During ACIXX-71 public health emergency), evaluation of the following organ systems was limited: Vitals/Constitutional/EENT/Resp/CV/GI//MS/Neuro/Skin/Heme-Lymph-Imm.   Pursuant to the emergency declaration under the [address] [place] [address] authority and the [employer] and [employer] Act, this Virtual Visit was conducted with patient's (and/or legal guardian's) consent, to reduce the patient's risk of exposure to COVID-19 and provide necessary medical care. The patient (and/or legal guardian) has also been advised to contact this office for worsening conditions or problems, and seek emergency medical treatment and/or call 911 if deemed necessary. Patient identification was verified at the start of the visit: Yes    Total time spent on this encounter: Not billed by time    Services were provided through a video synchronous discussion virtually to substitute for in-person clinic visit. Patient and provider were located at their individual homes. --SHONNA Nagel CNP on 4/30/2020 at 3:42 PM    An electronic signature was used to authenticate this note.

## 2020-07-21 ENCOUNTER — OFFICE VISIT (OUTPATIENT)
Dept: FAMILY MEDICINE CLINIC | Age: 75
End: 2020-07-21
Payer: MEDICARE

## 2020-07-21 VITALS
DIASTOLIC BLOOD PRESSURE: 63 MMHG | TEMPERATURE: 99.7 F | SYSTOLIC BLOOD PRESSURE: 138 MMHG | OXYGEN SATURATION: 91 % | HEIGHT: 71 IN | RESPIRATION RATE: 16 BRPM | HEART RATE: 82 BPM | BODY MASS INDEX: 31.86 KG/M2 | WEIGHT: 227.6 LBS

## 2020-07-21 PROCEDURE — 99213 OFFICE O/P EST LOW 20 MIN: CPT | Performed by: NURSE PRACTITIONER

## 2020-07-21 PROCEDURE — 3017F COLORECTAL CA SCREEN DOC REV: CPT | Performed by: NURSE PRACTITIONER

## 2020-07-21 PROCEDURE — 4040F PNEUMOC VAC/ADMIN/RCVD: CPT | Performed by: NURSE PRACTITIONER

## 2020-07-21 PROCEDURE — 1123F ACP DISCUSS/DSCN MKR DOCD: CPT | Performed by: NURSE PRACTITIONER

## 2020-07-21 PROCEDURE — 96372 THER/PROPH/DIAG INJ SC/IM: CPT | Performed by: NURSE PRACTITIONER

## 2020-07-21 PROCEDURE — G8427 DOCREV CUR MEDS BY ELIG CLIN: HCPCS | Performed by: NURSE PRACTITIONER

## 2020-07-21 PROCEDURE — G8417 CALC BMI ABV UP PARAM F/U: HCPCS | Performed by: NURSE PRACTITIONER

## 2020-07-21 PROCEDURE — 1036F TOBACCO NON-USER: CPT | Performed by: NURSE PRACTITIONER

## 2020-07-21 RX ORDER — METHYLPREDNISOLONE ACETATE 80 MG/ML
160 INJECTION, SUSPENSION INTRA-ARTICULAR; INTRALESIONAL; INTRAMUSCULAR; SOFT TISSUE ONCE
Status: COMPLETED | OUTPATIENT
Start: 2020-07-21 | End: 2020-07-21

## 2020-07-21 RX ADMIN — METHYLPREDNISOLONE ACETATE 160 MG: 80 INJECTION, SUSPENSION INTRA-ARTICULAR; INTRALESIONAL; INTRAMUSCULAR; SOFT TISSUE at 13:31

## 2020-07-21 ASSESSMENT — ENCOUNTER SYMPTOMS
RHINORRHEA: 0
EYE REDNESS: 0
TROUBLE SWALLOWING: 0
EYE DISCHARGE: 0
SORE THROAT: 0
NAUSEA: 0
BOWEL INCONTINENCE: 0
ABDOMINAL PAIN: 0
EYE PAIN: 0
DIARRHEA: 0
WHEEZING: 0
SHORTNESS OF BREATH: 0
VOMITING: 0
ALLERGIC/IMMUNOLOGIC NEGATIVE: 1
COUGH: 0
BACK PAIN: 1
CONSTIPATION: 0

## 2020-07-21 NOTE — PROGRESS NOTES
[address] [place]  [facility] [address]  Dept: [phone]  Dept Fax: [phone]  Loc: [phone]     Visit Date:  7/21/2020      Patient:  Debbie Chatman  YOB: 1945    HPI:     Chief Complaint   Patient presents with    Injections     in lower back, been painful past week, sharp shooting pain when gets up out of chair       Back Pain   This is a new problem. The current episode started in the past 7 days. The problem occurs constantly. The problem has been waxing and waning since onset. The pain is present in the lumbar spine. The pain is at a severity of 5/10. The pain is moderate. The pain is worse during the day. The symptoms are aggravated by bending, twisting, standing and position. Pertinent negatives include no abdominal pain, bladder incontinence, bowel incontinence, dysuria, fever, headaches, leg pain, numbness, pelvic pain, perianal numbness, tingling or weakness.        Medications    Current Outpatient Medications:     fluticasone-salmeterol (ADVAIR DISKUS) 250-50 MCG/DOSE AEPB, Inhale 1 puff into the lungs 2 times daily, Disp: 3 each, Rfl: 3    ipratropium-albuterol (DUONEB) 0.5-2.5 (3) MG/3ML SOLN nebulizer solution, Inhale 3 mLs into the lungs every 4 hours as needed for Shortness of Breath, Disp: 360 mL, Rfl: 0    atorvastatin (LIPITOR) 10 MG tablet, Take 1 tablet by mouth daily, Disp: , Rfl:     doxazosin (CARDURA) 8 MG tablet, Take 1 tablet by mouth daily, Disp: , Rfl:     albuterol sulfate (PROAIR RESPICLICK) 700 (90 Base) MCG/ACT aerosol powder inhalation, Inhale 2 puffs into the lungs every 6 hours as needed for Wheezing or Shortness of Breath, Disp: 3 Inhaler, Rfl: 3    OXYGEN, Inhale into the lungs 2 L qhs  per [facility], Disp: , Rfl:     glipiZIDE (GLIPIZIDE XL) 10 MG extended release tablet, Take 10 mg by mouth 2 times daily, Disp: , Rfl:     sildenafil (VIAGRA) 100 MG tablet, Take 100 mg by mouth as needed for Erectile Dysfunction, Disp: , Rfl:     ALPRAZolam (XANAX) 0.25 MG tablet, Take 0.25 mg by mouth nightly as needed for Sleep., Disp: , Rfl:     TRAVATAN Z 0.004 % SOLN ophthalmic solution, Place 1 drop into both eyes nightly., Disp: , Rfl:     Insulin Lispro Prot & Lispro (HUMALOG MIX 75/25 SC), Inject 30 Units into the skin 2 times daily , Disp: , Rfl:     quinapril (ACCUPRIL) 20 MG tablet, Take 20 mg by mouth 2 times daily. , Disp: , Rfl:     esomeprazole (NEXIUM) 40 MG capsule, Take 40 mg by mouth 2 times daily. , Disp: , Rfl:     metformin (GLUCOPHAGE) 500 MG tablet, Take 500 mg by mouth 2 times daily (with meals) HOLD 48 HRS PRIOR TO SURGERY, Disp: , Rfl:     The patient is allergic to propofol and bee venom. Past Medical History  Hermila Worthington  has a past medical history of Arthritis, Cancer ([facility] [address] COPD (chronic obstructive pulmonary disease) ([facility] [address] Hypertension, Nausea & vomiting, and Type II or unspecified type diabetes mellitus without mention of complication, not stated as uncontrolled. Subjective:      Review of Systems   Constitutional: Negative for activity change, fatigue and fever. HENT: Negative for congestion, ear pain, rhinorrhea, sore throat and trouble swallowing. Eyes: Negative for pain, discharge and redness. Respiratory: Negative for cough, shortness of breath and wheezing. Cardiovascular: Negative. Gastrointestinal: Negative for abdominal pain, bowel incontinence, constipation, diarrhea, nausea and vomiting. Endocrine: Negative. Genitourinary: Negative for bladder incontinence, dysuria, frequency, pelvic pain and urgency. Musculoskeletal: Positive for back pain. Negative for arthralgias and myalgias. Skin: Negative for rash. Allergic/Immunologic: Negative. Neurological: Negative for dizziness, tingling, tremors, weakness, numbness and headaches. Hematological: Negative.     Psychiatric/Behavioral: Negative for dysphoric mood and sleep disturbance. The patient is not nervous/anxious. Objective:     /63   Pulse 82   Temp 99.7 °F (37.6 °C) (Oral)   Resp 16   Ht 5' 11\" (1.803 m)   Wt 227 lb 9.6 oz (103.2 kg)   SpO2 91%   BMI 31.74 kg/m²     Physical Exam  Constitutional:       General: He is not in acute distress. Appearance: He is well-developed. He is not diaphoretic. HENT:      Right Ear: External ear normal.      Left Ear: External ear normal.      Nose: Nose normal.   Eyes:      General:         Right eye: No discharge. Left eye: No discharge. Conjunctiva/sclera: Conjunctivae normal.      Pupils: Pupils are equal, round, and reactive to light. Neck:      Musculoskeletal: Normal range of motion. Vascular: No JVD. Cardiovascular:      Rate and Rhythm: Normal rate and regular rhythm. Pulmonary:      Effort: Pulmonary effort is normal. No respiratory distress. Musculoskeletal: Normal range of motion. General: No tenderness or deformity. Skin:     General: Skin is warm and dry. Capillary Refill: Capillary refill takes less than 2 seconds. Coloration: Skin is not pale. Findings: No erythema or rash. Neurological:      Mental Status: He is alert and oriented to person, place, and time. Coordination: Coordination normal.   Psychiatric:         Behavior: Behavior normal.         Thought Content: Thought content normal.         Judgment: Judgment normal.         Assessment/Plan:      Claire Mcmullen was seen today for injections. Diagnoses and all orders for this visit:    Acute left-sided low back pain without sciatica  -     methylPREDNISolone acetate (DEPO-MEDROL) injection 160 mg        No follow-ups on file. Patient instructions given nancy.         Electronically signed by SHONNA Everett CNP on 7/21/2020 at 1:26 PM

## 2021-03-15 ENCOUNTER — OFFICE VISIT (OUTPATIENT)
Dept: FAMILY MEDICINE CLINIC | Age: 76
End: 2021-03-15
Payer: MEDICARE

## 2021-03-15 VITALS
SYSTOLIC BLOOD PRESSURE: 138 MMHG | HEART RATE: 68 BPM | WEIGHT: 224 LBS | RESPIRATION RATE: 20 BRPM | BODY MASS INDEX: 33.18 KG/M2 | DIASTOLIC BLOOD PRESSURE: 60 MMHG | TEMPERATURE: 98.2 F | HEIGHT: 69 IN

## 2021-03-15 DIAGNOSIS — M51.36 DDD (DEGENERATIVE DISC DISEASE), LUMBAR: Primary | ICD-10-CM

## 2021-03-15 PROCEDURE — G8417 CALC BMI ABV UP PARAM F/U: HCPCS | Performed by: FAMILY MEDICINE

## 2021-03-15 PROCEDURE — 4040F PNEUMOC VAC/ADMIN/RCVD: CPT | Performed by: FAMILY MEDICINE

## 2021-03-15 PROCEDURE — 1123F ACP DISCUSS/DSCN MKR DOCD: CPT | Performed by: FAMILY MEDICINE

## 2021-03-15 PROCEDURE — 1036F TOBACCO NON-USER: CPT | Performed by: FAMILY MEDICINE

## 2021-03-15 PROCEDURE — G8427 DOCREV CUR MEDS BY ELIG CLIN: HCPCS | Performed by: FAMILY MEDICINE

## 2021-03-15 PROCEDURE — G8482 FLU IMMUNIZE ORDER/ADMIN: HCPCS | Performed by: FAMILY MEDICINE

## 2021-03-15 PROCEDURE — 96372 THER/PROPH/DIAG INJ SC/IM: CPT | Performed by: FAMILY MEDICINE

## 2021-03-15 RX ORDER — METHYLPREDNISOLONE ACETATE 80 MG/ML
160 INJECTION, SUSPENSION INTRA-ARTICULAR; INTRALESIONAL; INTRAMUSCULAR; SOFT TISSUE ONCE
Status: COMPLETED | OUTPATIENT
Start: 2021-03-15 | End: 2021-03-15

## 2021-03-15 RX ADMIN — METHYLPREDNISOLONE ACETATE 160 MG: 80 INJECTION, SUSPENSION INTRA-ARTICULAR; INTRALESIONAL; INTRAMUSCULAR; SOFT TISSUE at 14:35

## 2021-03-15 ASSESSMENT — PATIENT HEALTH QUESTIONNAIRE - PHQ9
SUM OF ALL RESPONSES TO PHQ9 QUESTIONS 1 & 2: 0
SUM OF ALL RESPONSES TO PHQ QUESTIONS 1-9: 0

## 2021-03-15 NOTE — PROGRESS NOTES
Administrations This Visit     methylPREDNISolone acetate (DEPO-MEDROL) injection 160 mg     Admin Date  03/15/2021  14:35 Action  Given Dose  160 mg Route  Intramuscular Site  Dorsogluteal Right Administered By  Kirk Gardner RN    Ordering Provider: Ivette Luther MD    NDC: 9417-9157-17    Lot#: [id number]    : [id number].     Patient Supplied?: No

## 2021-03-21 ASSESSMENT — ENCOUNTER SYMPTOMS
SHORTNESS OF BREATH: 0
CONSTIPATION: 0
SINUS PRESSURE: 0
SORE THROAT: 0
ABDOMINAL DISTENTION: 0
EYE PAIN: 0
ABDOMINAL PAIN: 0
DIARRHEA: 0
COUGH: 0
BACK PAIN: 1
NAUSEA: 0
RHINORRHEA: 0

## 2021-03-21 NOTE — PROGRESS NOTES
by mouth 2 times daily.  esomeprazole (NEXIUM) 40 MG capsule Take 40 mg by mouth 2 times daily.  metformin (GLUCOPHAGE) 500 MG tablet Take 500 mg by mouth 2 times daily (with meals) HOLD 48 HRS PRIOR TO SURGERY       No current facility-administered medications for this visit. Allergies   Allergen Reactions    Propofol Anaphylaxis    Bee Venom      Health Maintenance   Topic Date Due    Hepatitis C screen  Never done    Meningococcal (ACWY) vaccine (1 - Risk start before 7 months 4-dose series) Never done    Hib vaccine (1 of 1 - Risk 1-dose series) Never done    Meningococcal B vaccine (1 of 4 - Increased Risk Bexsero 2-dose series) Never done    DTaP/Tdap/Td vaccine (1 - Tdap) Never done    Shingles Vaccine (2 of 3) 05/26/2016    Annual Wellness Visit (AWV)  Never done    Lipid screen  02/11/2022    Potassium monitoring  02/11/2022    Creatinine monitoring  02/11/2022    Flu vaccine  Completed    Pneumococcal 65+ yrs at Risk Vaccine  Completed    COVID-19 Vaccine  Completed    Hepatitis A vaccine  Aged Out         Objective:     Physical Exam  Constitutional:       General: He is not in acute distress. Appearance: He is well-developed. He is not diaphoretic. HENT:      Head: Normocephalic and atraumatic. Right Ear: External ear normal.      Left Ear: External ear normal.   Eyes:      Conjunctiva/sclera: Conjunctivae normal.   Neck:      Vascular: No JVD. Cardiovascular:      Rate and Rhythm: Normal rate and regular rhythm. Heart sounds: Normal heart sounds. Pulmonary:      Effort: Pulmonary effort is normal.      Breath sounds: Normal breath sounds. No wheezing or rales. Musculoskeletal:         General: Tenderness present. Skin:     General: Skin is warm and dry. Coloration: Skin is not pale. Neurological:      Mental Status: He is alert and oriented to person, place, and time.        /60   Pulse 68   Temp 98.2 °F (36.8 °C) (Oral)   Resp 20 Ht 5' 8.75\" (1.746 m)   Wt 224 lb (101.6 kg)   BMI 33.32 kg/m²       Impression/Plan:  1. DDD (degenerative disc disease), lumbar      Requested Prescriptions      No prescriptions requested or ordered in this encounter     No orders of the defined types were placed in this encounter. methylpred 160im    Patient giveneducational materials - see patient instructions. Discussed use, benefit, and side effects of prescribed medications. All patient questions answered. Pt voiced understanding. Reviewed health maintenance. Patient agreedwith treatment plan. Follow up as directed. **This report has been created using voice recognition software. It may contain minor errorswhich are inherent in voice recognition technology. **       Electronically signed by Stephie Dave MD on 3/21/2021 at 9:38 AM

## 2021-03-26 ENCOUNTER — OFFICE VISIT (OUTPATIENT)
Dept: PULMONOLOGY | Age: 76
End: 2021-03-26
Payer: MEDICARE

## 2021-03-26 VITALS
OXYGEN SATURATION: 94 % | WEIGHT: 244 LBS | SYSTOLIC BLOOD PRESSURE: 128 MMHG | DIASTOLIC BLOOD PRESSURE: 60 MMHG | BODY MASS INDEX: 33.05 KG/M2 | TEMPERATURE: 97.7 F | HEIGHT: 72 IN | HEART RATE: 74 BPM

## 2021-03-26 DIAGNOSIS — G47.34 NOCTURNAL OXYGEN DESATURATION: ICD-10-CM

## 2021-03-26 DIAGNOSIS — J43.2 CENTRILOBULAR EMPHYSEMA (HCC): ICD-10-CM

## 2021-03-26 DIAGNOSIS — J43.9 MIXED RESTRICTIVE AND OBSTRUCTIVE LUNG DISEASE (HCC): Primary | ICD-10-CM

## 2021-03-26 DIAGNOSIS — J98.4 MIXED RESTRICTIVE AND OBSTRUCTIVE LUNG DISEASE (HCC): Primary | ICD-10-CM

## 2021-03-26 DIAGNOSIS — R63.5 WEIGHT GAIN: ICD-10-CM

## 2021-03-26 DIAGNOSIS — E66.9 OBESITY (BMI 30.0-34.9): ICD-10-CM

## 2021-03-26 PROCEDURE — 99214 OFFICE O/P EST MOD 30 MIN: CPT | Performed by: NURSE PRACTITIONER

## 2021-03-26 PROCEDURE — 1036F TOBACCO NON-USER: CPT | Performed by: NURSE PRACTITIONER

## 2021-03-26 PROCEDURE — G8427 DOCREV CUR MEDS BY ELIG CLIN: HCPCS | Performed by: NURSE PRACTITIONER

## 2021-03-26 PROCEDURE — 3023F SPIROM DOC REV: CPT | Performed by: NURSE PRACTITIONER

## 2021-03-26 PROCEDURE — G8926 SPIRO NO PERF OR DOC: HCPCS | Performed by: NURSE PRACTITIONER

## 2021-03-26 PROCEDURE — 4040F PNEUMOC VAC/ADMIN/RCVD: CPT | Performed by: NURSE PRACTITIONER

## 2021-03-26 PROCEDURE — G8482 FLU IMMUNIZE ORDER/ADMIN: HCPCS | Performed by: NURSE PRACTITIONER

## 2021-03-26 PROCEDURE — G8417 CALC BMI ABV UP PARAM F/U: HCPCS | Performed by: NURSE PRACTITIONER

## 2021-03-26 PROCEDURE — 1123F ACP DISCUSS/DSCN MKR DOCD: CPT | Performed by: NURSE PRACTITIONER

## 2021-03-26 ASSESSMENT — ENCOUNTER SYMPTOMS
VOMITING: 0
WHEEZING: 0
SHORTNESS OF BREATH: 1
EYES NEGATIVE: 1
COUGH: 1
DIARRHEA: 0
ABDOMINAL PAIN: 0
NAUSEA: 0

## 2021-03-26 NOTE — PROGRESS NOTES
[facility]     Patient: David Araujo, 67 y.o.   : 1945  3/26/2021     Former pt of Dr Yosi Beach and Reece Gomez CNP      Subjective     Chief Complaint   Patient presents with    Follow-up     follow up,last seen Niharika  2020        HPI  Eliezer Zuleta is here for 1year  follow up for COPD   On home O2 at 2LPM with sleep only. Nightly oxygen initially prescribed back in 2017 after hypoxia during hospitalization. He is using his oxygen compliantly and feels sleeps better with less snoring since using oxygen   Travels to [place] a few times per year, asking about POC for traveling. Former smoker, quit in  [facility],[address] 250/50 BID with compliance, had PRoAir seldomly uses, has Duoneb only uses when bronchitis, has not had any flare ups this year   No personal history of COVID  Has had both vaccinations  Dyspnea/ cough at baseline     Last Spirometry : full pft 2017- mixed restriction and obstruction   Last 6MWT: 2017- no hypoxia     No new medical issues     Past Medical hx   PMH:  Past Medical History:   Diagnosis Date    Arthritis     Cancer ([address]     Spelenectomy, cholycystectomy,partial pancrease, partial stomache, lymphnodes    COPD (chronic obstructive pulmonary disease) ([address]     Hypertension     Nausea & vomiting     Type II or unspecified type diabetes mellitus without mention of complication, not stated as uncontrolled      SURGICAL HISTORY:  Past Surgical History:   Procedure Laterality Date    CHOLECYSTECTOMY      COLONOSCOPY  3/16/12    [address]    CYST REMOVAL      FROM NECK AND BACK    HERNIA REPAIR      OTHER SURGICAL HISTORY  2013    RIGHT POST.  CERVICAL LAMINOTOMIES AND FORAMINOTOMIES    PANCREAS SURGERY      partial removal     PARATHYROID GLAND SURGERY      PILONIDAL CYST EXCISION      SPLENECTOMY, TOTAL       SOCIAL HISTORY:  Social History     Tobacco Use    Smoking status: Former Smoker Packs/day: 2.00     Years: 20.00     Pack years: 40.00     Types: Cigarettes     Quit date: 1991     Years since quittin.8    Smokeless tobacco: Never Used   Substance Use Topics    Alcohol use: Yes     Alcohol/week: 0.0 standard drinks     Comment: rarely    Drug use: No     ALLERGIES:  Allergies   Allergen Reactions    Propofol Anaphylaxis    Bee Venom      FAMILY HISTORY:  Family History   Problem Relation Age of Onset    Cancer Father         zollinger shay syndrome    Cancer Son         carcinoid cancer     CURRENT MEDICATIONS:  Current Outpatient Medications   Medication Sig Dispense Refill    fluticasone-salmeterol (ADVAIR DISKUS) 250-50 MCG/DOSE AEPB Inhale 1 puff into the lungs 2 times daily 3 each 3    ipratropium-albuterol (DUONEB) 0.5-2.5 (3) MG/3ML SOLN nebulizer solution Inhale 3 mLs into the lungs every 4 hours as needed for Shortness of Breath 360 mL 0    atorvastatin (LIPITOR) 10 MG tablet Take 1 tablet by mouth daily      doxazosin (CARDURA) 8 MG tablet Take 1 tablet by mouth daily      albuterol sulfate (PROAIR RESPICLICK) 531 (90 Base) MCG/ACT aerosol powder inhalation Inhale 2 puffs into the lungs every 6 hours as needed for Wheezing or Shortness of Breath 3 Inhaler 3    OXYGEN Inhale into the lungs 2 L qhs  per [facility] [place]      glipiZIDE (GLIPIZIDE XL) 10 MG extended release tablet Take 10 mg by mouth 2 times daily      sildenafil (VIAGRA) 100 MG tablet Take 100 mg by mouth as needed for Erectile Dysfunction      ALPRAZolam (XANAX) 0.25 MG tablet Take 0.25 mg by mouth nightly as needed for Sleep.  TRAVATAN Z 0.004 % SOLN ophthalmic solution Place 1 drop into both eyes nightly.  Insulin Lispro Prot & Lispro (HUMALOG MIX 75/25 SC) Inject 30 Units into the skin 2 times daily       quinapril (ACCUPRIL) 20 MG tablet Take 20 mg by mouth 2 times daily.  esomeprazole (NEXIUM) 40 MG capsule Take 40 mg by mouth 2 times daily.         metformin (GLUCOPHAGE) 500 MG tablet Take 500 mg by mouth 2 times daily (with meals) HOLD 48 HRS PRIOR TO SURGERY       No current facility-administered medications for this visit. Samira VARNER   Review of Systems   Constitutional: Negative for chills and fever. HENT: Negative. Eyes: Negative. Respiratory: Positive for cough and shortness of breath. Negative for wheezing. Cardiovascular: Negative for chest pain, palpitations and leg swelling. Gastrointestinal: Negative for abdominal pain, diarrhea, nausea and vomiting. Genitourinary: Negative. Musculoskeletal: Negative. Skin: Negative. Neurological: Negative. Hematological: Does not bruise/bleed easily. Psychiatric/Behavioral: Negative for sleep disturbance and suicidal ideas. Physical exam   /60 (Site: Left Upper Arm, Position: Sitting, Cuff Size: Large Adult)   Pulse 74   Temp 97.7 °F (36.5 °C) (Tympanic)   Ht 6' (1.829 m)   Wt 244 lb (110.7 kg)   SpO2 94% Comment: room air  BMI 33.09 kg/m²      Wt Readings from Last 3 Encounters:   03/26/21 244 lb (110.7 kg)   03/15/21 224 lb (101.6 kg)   07/21/20 227 lb 9.6 oz (103.2 kg)     Physical Exam  Vitals signs and nursing note reviewed. Constitutional:       General: He is not in acute distress. Appearance: Normal appearance. He is well-developed. HENT:      Mouth/Throat:      Lips: Pink. Mouth: Mucous membranes are moist.      Pharynx: Oropharynx is clear. No oropharyngeal exudate or posterior oropharyngeal erythema. Eyes:      Conjunctiva/sclera: Conjunctivae normal.   Neck:      Vascular: No JVD. Cardiovascular:      Rate and Rhythm: Normal rate and regular rhythm. Heart sounds: No murmur. No friction rub. Pulmonary:      Effort: Pulmonary effort is normal. No accessory muscle usage or respiratory distress. Breath sounds: Normal breath sounds. No wheezing, rhonchi or rales. Chest:      Chest wall: No tenderness. Musculoskeletal:      Right lower leg: No edema. Left lower leg: No edema. Skin:     General: Skin is warm and dry. Capillary Refill: Capillary refill takes less than 2 seconds. Nails: There is no clubbing. Neurological:      Mental Status: He is alert. Psychiatric:         Mood and Affect: Mood normal.         Behavior: Behavior normal.         Thought Content: Thought content normal.         Judgment: Judgment normal.          Test results   Lung Nodule Screening     [] Qualifies    [x]Does not qualify   [] Declined    [] Completed  Assessment      Diagnosis Orders   1. Mixed restrictive and obstructive lung disease ([facility]     2. Centrilobular emphysema ([facility]     3. Nocturnal oxygen desaturation     4. Obesity (BMI 30.0-34.9)     5. Weight gain         Plan    -counseled on his weight gain and recommendations for weight reduction  -doing well on Advair, continue- refilled through [facility]  -benefiting from oxygen at night and using with good compliance, continue 2LPM. Recommend he discuss POC with his DME provider to see if eligible.  If unable to purchase may be able rent for times of travel  -continue PRN albuterol  -is UTD with vaccines    Will see Nu Jesus in: 1 year    Electronically signed by SHONNA Covington CNP on 3/26/2021 at 12:05 PM

## 2021-04-05 ENCOUNTER — PATIENT MESSAGE (OUTPATIENT)
Dept: FAMILY MEDICINE CLINIC | Age: 76
End: 2021-04-05

## 2021-04-05 DIAGNOSIS — M51.36 DDD (DEGENERATIVE DISC DISEASE), LUMBAR: Primary | ICD-10-CM

## 2021-04-05 DIAGNOSIS — M48.061 SPINAL STENOSIS OF LUMBAR REGION, UNSPECIFIED WHETHER NEUROGENIC CLAUDICATION PRESENT: ICD-10-CM

## 2021-04-05 NOTE — TELEPHONE ENCOUNTER
From: Gregg Hurtado  To: Kimberly Eli MD  Sent: 4/5/2021 12:58 PM EDT  Subject: Non-Urgent Medical Question    On March 28th you gave me an injection for back pain. Unfortunately the pain is just as bad now as it was then. What do you suggest as the next step for this problem?   Thank you,    Kelley Whitlock

## 2021-04-28 ENCOUNTER — HOSPITAL ENCOUNTER (EMERGENCY)
Age: 76
Discharge: HOME | End: 2021-04-28
Payer: COMMERCIAL

## 2021-04-28 ENCOUNTER — APPOINTMENT (OUTPATIENT)
Dept: INTERVENTIONAL RADIOLOGY/VASCULAR | Age: 76
End: 2021-04-28
Payer: COMMERCIAL

## 2021-04-28 VITALS
TEMPERATURE: 97.9 F | SYSTOLIC BLOOD PRESSURE: 147 MMHG | HEART RATE: 72 BPM | DIASTOLIC BLOOD PRESSURE: 71 MMHG | OXYGEN SATURATION: 95 % | RESPIRATION RATE: 16 BRPM

## 2021-04-28 DIAGNOSIS — T14.8XXA HEMATOMA: Primary | ICD-10-CM

## 2021-04-28 PROCEDURE — 93971 EXTREMITY STUDY: CPT

## 2021-04-28 PROCEDURE — 99282 EMERGENCY DEPT VISIT SF MDM: CPT

## 2021-04-28 ASSESSMENT — ENCOUNTER SYMPTOMS
RHINORRHEA: 0
NAUSEA: 0
ABDOMINAL PAIN: 0
SHORTNESS OF BREATH: 0
DIARRHEA: 0
VOMITING: 0
COLOR CHANGE: 0
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
## 2021-04-28 NOTE — ED NOTES
Bed: 005A  Expected date:   Expected time:   Means of arrival:   Comments:     Chandrika Delcid RN  04/28/21 1771
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
## 2021-05-12 ENCOUNTER — OFFICE VISIT (OUTPATIENT)
Dept: PHYSICAL MEDICINE AND REHAB | Age: 76
End: 2021-05-12
Payer: MEDICARE

## 2021-05-12 VITALS
WEIGHT: 244 LBS | SYSTOLIC BLOOD PRESSURE: 136 MMHG | HEIGHT: 72 IN | DIASTOLIC BLOOD PRESSURE: 80 MMHG | BODY MASS INDEX: 33.05 KG/M2

## 2021-05-12 DIAGNOSIS — M48.061 SPINAL STENOSIS OF LUMBAR REGION WITHOUT NEUROGENIC CLAUDICATION: ICD-10-CM

## 2021-05-12 DIAGNOSIS — M47.816 LUMBAR SPONDYLOSIS: Primary | ICD-10-CM

## 2021-05-12 DIAGNOSIS — M46.1 SACROILIAC INFLAMMATION (HCC): ICD-10-CM

## 2021-05-12 DIAGNOSIS — G89.4 CHRONIC PAIN SYNDROME: ICD-10-CM

## 2021-05-12 PROCEDURE — 1036F TOBACCO NON-USER: CPT | Performed by: PAIN MEDICINE

## 2021-05-12 PROCEDURE — G8427 DOCREV CUR MEDS BY ELIG CLIN: HCPCS | Performed by: PAIN MEDICINE

## 2021-05-12 PROCEDURE — 99204 OFFICE O/P NEW MOD 45 MIN: CPT | Performed by: PAIN MEDICINE

## 2021-05-12 PROCEDURE — 4040F PNEUMOC VAC/ADMIN/RCVD: CPT | Performed by: PAIN MEDICINE

## 2021-05-12 PROCEDURE — 1123F ACP DISCUSS/DSCN MKR DOCD: CPT | Performed by: PAIN MEDICINE

## 2021-05-12 PROCEDURE — G8417 CALC BMI ABV UP PARAM F/U: HCPCS | Performed by: PAIN MEDICINE

## 2021-05-12 ASSESSMENT — ENCOUNTER SYMPTOMS
CONSTIPATION: 0
SINUS PRESSURE: 0
PHOTOPHOBIA: 0
SHORTNESS OF BREATH: 0
BACK PAIN: 1
ABDOMINAL PAIN: 0
CHEST TIGHTNESS: 0
BOWEL INCONTINENCE: 0
COUGH: 0
WHEEZING: 0
EYE PAIN: 0
NAUSEA: 0
SORE THROAT: 0
VOMITING: 0
COLOR CHANGE: 0
RHINORRHEA: 0
DIARRHEA: 0

## 2021-05-12 NOTE — LETTER
[id number] [facility] [address] [place] [address]  Phone: [phone]  Fax: [phone]           Liss Mcfadden MD      May 25, 2021     Patient: Chente Cobian   MR Number: [id number]   YOB: 1945   Date of Visit: 5/12/2021       Dear Dr. Haydee Walker: Thank you for referring Sandra Ambriz to me for evaluation/treatment. Below are the relevant portions of my assessment and plan of care. Assessment:     1. Lumbar spondylosis    2. Spinal stenosis of lumbar region without neurogenic claudication    3. Sacroiliac inflammation ([address]    4. Chronic pain syndrome        Plan:      · Patient read and signed orientation and opioid agreement. · OARRS reviewed. Current MED: 0.50  · Patient was not offered naloxone for home. · Discussed long term side effects of medications, tolerance, dependency and addiction. · UDS preformed today. · Patient told can not receive any pain medications from any other source. · No evidence of abuse, diversion or aberrant behavior. · Prescription Needs: No prescription pain medications at this time   Medications and/or procedures to improve function and quality of life- patient understanding with this and that may not be pain free   Discussed possible weaning of medication dosing dependent on treatment/procedure results.  Discussed with patient about safe storage of medications at home   Testing: Reviewed MRI Lumbar Spine with patient.  Procedures: L-facet MBB @ L4-5 and L5-S1 Right side only for diagnostic purpose.  Discussed with patient about risks with procedure including infection, reaction to medication, increased pain, or bleeding.  Medications:Reviewed   If patient is on blood thinners will need approval to hold:NO      Previous Treatments tried:  · PT: Yes,  any benefit? No, how many weeks? 4-6, last date done: several yrs ago  · NSAIDs: Yes,  any benefit?  Yes,  how long taken: takes  · Chiropractic: No,    · Muscle Relaxant: any benefit? No No,  any benefit? No  · Narcotics: No,  any benefit? No  · Spine surgeon consult: No  · Any Implants: No    Meds. Prescribed:   No orders of the defined types were placed in this encounter. Return Right Side only L-facet MBB @ L4-5 and L5-S1. If you have questions, please do not hesitate to call me. I look forward to following Oxana Mireles along with you.     Sincerely,        Tommy Cerda MD    CC providers:  Koki Espino MD  [facility] [address] [facility] [place] [address]  [place] [place]

## 2021-05-12 NOTE — PROGRESS NOTES
[facility] [address]  Dept: [phone]  Dept Fax: [phone]: [phone]    Visit Date: 5/12/2021    Arabella Lantigua is a 67 y.o. male who is referred for pain management evaluation and treatment per Dr. Wallace Finch. CAGE and CAGE-AID Questions   1. In the last three months, have you felt you should cut down or stop drinking or using drugs? Yes []        No [x]     2. In the last three months, has anyone annoyed you or gotten on your nerves by telling you to cut down or stop drinking or using drugs? Yes []        No [x]     3. In the last three months, have you felt guilty or bad about how much you drink or use drugs? Yes []        No [x]     4. In the last three months, have you been waking up wanting to have an alcoholic drink or use drugs? Yes []        No [x]        Opioid Risk Tool:  Clinician Form       1. Family History of Substance Abuse: Female Male    Alcohol   []1   []3    Illegal drugs   []2   []3    Prescription drugs     []4   []4   2. Personal History of Substance Abuse:          Alcohol   []3   []3    Illegal drugs   []4   []4    Prescription drugs     []5   []5   3. Age (chirag box if between 12 and 39):     []1   []1   4. History of Preadolescent Sexual Abuse:     []3   []0   5. Psychological Disease:      Attention deficit disorder, obsessive-compulsive disorder, bipolar, schizophrenia   []2   []2      Depression     []1   [x]1    Scoring Totals 0 1     Total Score  Low Risk  Moderate Risk  High Risk   Risk Category   0 - 3   4 - 7   8 or Above      Patient states symptoms interfere with:  A.  General Activity:  yes   B. Mood: no    C. Walking Ability:   yes   D. Normal Work (Includes both work outside the home and housework):   yes    E.  Relations with Other People:  no   F. Sleep:   no   G.  Enjoyment of Life:  no       HPI: ChiefComplaint: Low back pain    Back Pain  Chronicity: Patient is a 68 y/o male with history of lower back pain for several years but 6 weeks ago became severe. Denies prior lumbar back surgeries. Denies any injureis. The problem occurs constantly. The problem has been rapidly worsening since onset. The pain is present in the lumbar spine. The quality of the pain is described as stabbing. The pain does not radiate. The pain is at a severity of 9/10. The symptoms are aggravated by standing (walking). Pertinent negatives include no abdominal pain, bladder incontinence, bowel incontinence, chest pain, fever, headaches, numbness, tingling or weakness. 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 Treatments tried: IM steroid injection. The treatment provided no relief.      MRI LUMBAR SPINE 05/22/2019:      FINDINGS:          Lumbar straightening.  No significant scoliosis.  Conus medullaris     terminates normally at the L1 level.          No acute fracture line.  No dislocation.  No acute cortical destruction.          T12-L1:  Normal endplates.  Normal disc height, hydration and morphology.     Normal bilateral facet joints.  Normal central canal and bilateral lateral     recesses.  Normal bilateral intervertebral neural foramina.          L1-2:  Normal endplates.  Disc desiccation.  Normal bilateral facet     joints.  Normal central canal and bilateral lateral recesses.  Normal     bilateral intervertebral neural foramina.          L2-3:  Normal endplates.  Disc desiccation.  Normal bilateral facet     joints.  Normal central canal and bilateral lateral recesses.  Normal     bilateral intervertebral neural foramina.          L3-4:  Normal endplates.  Disc bulge with mild central canal narrowing.     Normal bilateral facet joints.  Normal bilateral lateral recesses.     Bilateral neural femoral narrowing without impingement.          L4-5:  Normal endplates.  Disc bulge with mild central canal narrowing.     Normal bilateral facet joints.  Normal bilateral lateral recesses.     Bilateral neural femoral narrowing without impingement.          L5-S1:  Mild endplate spondylosis.  Bilobed disc bulge, slightly     asymmetric to the left, with mild central canal narrowing.  Normal     bilateral facet joints.  Bilateral lateral recess narrowing with     impingement on the left.  Bilateral neural foramina narrowing with     impingement, left greater than right.          Sacrum intact.  Mild paraspinal muscle atrophy.  Normal aorta.     Cholecystectomy.          ___________________________________          IMPRESSION:          Multilevel intervertebral disc disease with mild central canal narrowing     at L3-4, L4-5 and L5-S1          Multilevel neural foraminal narrowing with impingement of the bilateral L5     nerve roots (left greater than right)          Bilateral lateral recess narrowing with impingement of the left descending     S1 nerve root          Lumbar straightening with mild L5-S1 endplate spondylosis              The patient is allergic to propofol and bee venom. Subjective:      Review of Systems   Constitutional: Positive for activity change. Negative for appetite change, chills, diaphoresis, fatigue, fever and unexpected weight change. HENT: Negative for congestion, ear pain, hearing loss, mouth sores, nosebleeds, rhinorrhea, sinus pressure and sore throat. Eyes: Negative for photophobia, pain and visual disturbance. Respiratory: Negative for cough, chest tightness, shortness of breath and wheezing. Hx COPD   Cardiovascular: Negative for chest pain and palpitations. Gastrointestinal: Negative for abdominal pain, bowel incontinence, constipation, diarrhea, nausea and vomiting. Endocrine: Negative for cold intolerance, heat intolerance, polydipsia, polyphagia and polyuria.         Hx IDDM   Genitourinary: Negative for bladder incontinence, decreased urine volume, difficulty urinating, frequency and hematuria. Musculoskeletal: Positive for back pain, gait problem and neck pain. Negative for arthralgias, joint swelling, myalgias and neck stiffness. Skin: Negative for color change and rash. Allergic/Immunologic: Negative for food allergies and immunocompromised state. Neurological: Negative for dizziness, tingling, tremors, seizures, syncope, facial asymmetry, speech difficulty, weakness, light-headedness, numbness and headaches. Hematological: Does not bruise/bleed easily. Psychiatric/Behavioral: Negative for agitation, behavioral problems, confusion, decreased concentration, dysphoric mood, hallucinations, self-injury, sleep disturbance and suicidal ideas. The patient is nervous/anxious. The patient is not hyperactive. Objective:     Vitals:    05/12/21 1550   BP: 136/80   Weight: 244 lb (110.7 kg)   Height: 6' (1.829 m)       Physical Exam  Vitals and nursing note reviewed. Constitutional:       General: He is not in acute distress. Appearance: He is well-developed. He is not diaphoretic. HENT:      Head: Normocephalic and atraumatic. Right Ear: External ear normal.      Left Ear: External ear normal.      Nose: Nose normal.      Mouth/Throat:      Pharynx: No oropharyngeal exudate. Eyes:      General: No scleral icterus. Right eye: No discharge. Left eye: No discharge. Conjunctiva/sclera: Conjunctivae normal.      Pupils: Pupils are equal, round, and reactive to light. Neck:      Thyroid: No thyromegaly. Cardiovascular:      Rate and Rhythm: Normal rate and regular rhythm. Heart sounds: Normal heart sounds. No murmur heard. No friction rub. No gallop. Pulmonary:      Effort: Pulmonary effort is normal. No respiratory distress. Breath sounds: Normal breath sounds. No wheezing or rales. Chest:      Chest wall: No tenderness. Abdominal:      General: Bowel sounds are normal. There is no distension. Palpations: Abdomen is soft. Tenderness: There is no abdominal tenderness. There is no guarding or rebound. Musculoskeletal:      Cervical back: Full passive range of motion without pain, normal range of motion and neck supple. No edema, erythema or rigidity. No muscular tenderness. Normal range of motion. Thoracic back: No tenderness. Lumbar back: Pain and tenderness present. Decreased range of motion. Back:       Right hip: No tenderness. Left hip: No tenderness. Right knee: No swelling. Left knee: No swelling. Right ankle: No swelling. Left ankle: No swelling. Skin:     General: Skin is warm. Coloration: Skin is not pale. Findings: No erythema or rash. Neurological:      Mental Status: He is alert and oriented to person, place, and time. He is not disoriented. Cranial Nerves: No cranial nerve deficit. Sensory: No sensory deficit. Motor: No atrophy or abnormal muscle tone. Coordination: Coordination normal.      Gait: Gait normal.      Deep Tendon Reflexes: Reflexes are normal and symmetric. Babinski sign absent on the right side. Babinski sign absent on the left side. Comments: SLR NEG   Psychiatric:         Attention and Perception: Attention normal. He is attentive. Mood and Affect: Mood normal. Mood is not anxious or depressed. Affect is not labile, blunt, angry or inappropriate. Speech: Speech normal. He is communicative. Speech is not rapid and pressured, delayed, slurred or tangential.         Behavior: Behavior normal. Behavior is not agitated, slowed, aggressive, withdrawn, hyperactive or combative. Behavior is cooperative. Thought Content: Thought content normal. Thought content is not paranoid or delusional. Thought content does not include homicidal or suicidal ideation. Thought content does not include homicidal or suicidal plan.          Cognition and Memory: Cognition normal. Memory is not encounter. Return Right Side only L-facet MBB @ L4-5 and L5-S1. Time spent with patient was 45 minutes more than 50% was spent  Counseling/coordinated the patient'scare.     Electronically signed by Robbin Nicholson MD on 5/25/2021 at 3:36 PM

## 2021-05-26 ENCOUNTER — TELEPHONE (OUTPATIENT)
Dept: PHYSICAL MEDICINE AND REHAB | Age: 76
End: 2021-05-26

## 2021-05-26 NOTE — TELEPHONE ENCOUNTER
Pt. Called office. REquests procedure rescheduled to an earlier time due to being a Diabetic. Procedure rescheduled to 6/17/2021 @ 7:50, arrive at 6:50am. Verbalized understanding.

## 2021-06-10 ENCOUNTER — TELEPHONE (OUTPATIENT)
Dept: PHYSICAL MEDICINE AND REHAB | Age: 76
End: 2021-06-10

## 2021-06-10 NOTE — TELEPHONE ENCOUNTER
Pt. Called the office states that he has had absolutely NO pain the last 3 weeks. He is scheduled for a L-Facet MBB 6/17/2021. He wants to know if you think he should keep this appointment or cancel it and wait and see if the pain returns.   Please advise, Thanks

## 2021-11-08 ENCOUNTER — HOSPITAL ENCOUNTER (EMERGENCY)
Age: 76
Discharge: HOME | End: 2021-11-09
Attending: INTERNAL MEDICINE
Payer: MEDICARE

## 2021-11-08 DIAGNOSIS — E16.2 HYPOGLYCEMIA: Primary | ICD-10-CM

## 2021-11-08 PROCEDURE — 99284 EMERGENCY DEPT VISIT MOD MDM: CPT

## 2021-11-09 VITALS
DIASTOLIC BLOOD PRESSURE: 65 MMHG | WEIGHT: 225 LBS | OXYGEN SATURATION: 94 % | HEART RATE: 69 BPM | SYSTOLIC BLOOD PRESSURE: 148 MMHG | HEIGHT: 77 IN | TEMPERATURE: 97.8 F | BODY MASS INDEX: 26.57 KG/M2 | RESPIRATION RATE: 16 BRPM

## 2021-11-09 LAB
ANION GAP SERPL CALCULATED.3IONS-SCNC: 9 MEQ/L (ref 8–16)
BASOPHILS # BLD: 0.7 %
BASOPHILS ABSOLUTE: 0.1 THOU/MM3 (ref 0–0.1)
BUN BLDV-MCNC: 15 MG/DL (ref 7–22)
CALCIUM SERPL-MCNC: 9.8 MG/DL (ref 8.5–10.5)
CHLORIDE BLD-SCNC: 100 MEQ/L (ref 98–111)
CO2: 29 MEQ/L (ref 23–33)
CREAT SERPL-MCNC: 1 MG/DL (ref 0.4–1.2)
EOSINOPHIL # BLD: 6.6 %
EOSINOPHILS ABSOLUTE: 1 THOU/MM3 (ref 0–0.4)
ERYTHROCYTE [DISTWIDTH] IN BLOOD BY AUTOMATED COUNT: 13 % (ref 11.5–14.5)
ERYTHROCYTE [DISTWIDTH] IN BLOOD BY AUTOMATED COUNT: 47.9 FL (ref 35–45)
GFR SERPL CREATININE-BSD FRML MDRD: 72 ML/MIN/1.73M2
GLUCOSE BLD-MCNC: 88 MG/DL (ref 70–108)
HCT VFR BLD CALC: 35.7 % (ref 42–52)
HEMOGLOBIN: 10.9 GM/DL (ref 14–18)
IMMATURE GRANS (ABS): 0.05 THOU/MM3 (ref 0–0.07)
IMMATURE GRANULOCYTES: 0.3 %
LYMPHOCYTES # BLD: 15.6 %
LYMPHOCYTES ABSOLUTE: 2.4 THOU/MM3 (ref 1–4.8)
MCH RBC QN AUTO: 30.6 PG (ref 26–33)
MCHC RBC AUTO-ENTMCNC: 30.5 GM/DL (ref 32.2–35.5)
MCV RBC AUTO: 100.3 FL (ref 80–94)
MONOCYTES # BLD: 12.1 %
MONOCYTES ABSOLUTE: 1.8 THOU/MM3 (ref 0.4–1.3)
NUCLEATED RED BLOOD CELLS: 0 /100 WBC
OSMOLALITY CALCULATION: 275.9 MOSMOL/KG (ref 275–300)
PLATELET # BLD: 310 THOU/MM3 (ref 130–400)
PMV BLD AUTO: 10.6 FL (ref 9.4–12.4)
POTASSIUM REFLEX MAGNESIUM: 5.1 MEQ/L (ref 3.5–5.2)
RBC # BLD: 3.56 MILL/MM3 (ref 4.7–6.1)
SEG NEUTROPHILS: 64.7 %
SEGMENTED NEUTROPHILS ABSOLUTE COUNT: 9.8 THOU/MM3 (ref 1.8–7.7)
SODIUM BLD-SCNC: 138 MEQ/L (ref 135–145)
WBC # BLD: 15.1 THOU/MM3 (ref 4.8–10.8)

## 2021-11-09 PROCEDURE — 85025 COMPLETE CBC W/AUTO DIFF WBC: CPT

## 2021-11-09 PROCEDURE — 80048 BASIC METABOLIC PNL TOTAL CA: CPT

## 2021-11-09 PROCEDURE — 36415 COLL VENOUS BLD VENIPUNCTURE: CPT

## 2021-11-09 NOTE — ED PROVIDER NOTES
eMERGENCY dEPARTMENT eNCOUnter      [facility]    Chief Complaint   Patient presents with    Hypoglycemia       HPI    Renita Gonsalez is a 67 y.o. male who presents as a department because of hypoglycemia. Patient woke up at about 11 PM tonight and when the wife talked to him he was not behaving his normal self. Patient's blood glucose on the way to the emergency department by EMS was in the 50s. Patient was given oral glucose. Patient is feeling much better in the emergency department now. Patient has been eating less to lose weight but has been on the same amount of insulin. Patient has few episodes of hypoglycemia lately. PAST MEDICAL HISTORY    Past Medical History:   Diagnosis Date    Arthritis     Cancer ([facility] [address] 1990    Spelenectomy, cholycystectomy,partial pancrease, partial stomache, lymphnodes    COPD (chronic obstructive pulmonary disease) ([facility] [address]     Hypertension     Nausea & vomiting     Type II or unspecified type diabetes mellitus without mention of complication, not stated as uncontrolled        SURGICAL HISTORY    Past Surgical History:   Procedure Laterality Date    CHOLECYSTECTOMY      [facility]  3/16/12    [facility]    CYST REMOVAL      FROM NECK AND BACK    HERNIA REPAIR      OTHER SURGICAL HISTORY  1/25/2013    RIGHT POST.  CERVICAL LAMINOTOMIES AND FORAMINOTOMIES    PANCREAS SURGERY      partial removal     PARATHYROID GLAND SURGERY      PILONIDAL CYST EXCISION      SPLENECTOMY, TOTAL         CURRENT MEDICATIONS    Current Outpatient Rx   Medication Sig Dispense Refill    fluticasone-salmeterol (ADVAIR DISKUS) 250-50 MCG/DOSE AEPB Inhale 1 puff into the lungs 2 times daily 3 each 3    ipratropium-albuterol (DUONEB) 0.5-2.5 (3) MG/3ML SOLN nebulizer solution Inhale 3 mLs into the lungs every 4 hours as needed for Shortness of Breath 360 mL 0    atorvastatin (LIPITOR) 10 MG tablet Take 1 tablet by mouth daily      doxazosin (CARDURA) 8 MG tablet Take 1 tablet by mouth daily      albuterol sulfate (PROAIR RESPICLICK) 732 (90 Base) MCG/ACT aerosol powder inhalation Inhale 2 puffs into the lungs every 6 hours as needed for Wheezing or Shortness of Breath 3 Inhaler 3    OXYGEN Inhale into the lungs 2 L qhs  per Halle Nicole      glipiZIDE (GLIPIZIDE XL) 10 MG extended release tablet Take 10 mg by mouth 2 times daily      sildenafil (VIAGRA) 100 MG tablet Take 100 mg by mouth as needed for Erectile Dysfunction      ALPRAZolam (XANAX) 0.25 MG tablet Take 0.25 mg by mouth nightly as needed for Sleep.  TRAVATAN Z 0.004 % SOLN ophthalmic solution Place 1 drop into both eyes nightly.  Insulin Lispro Prot & Lispro (HUMALOG MIX 75/25 SC) Inject 30 Units into the skin 2 times daily       quinapril (ACCUPRIL) 20 MG tablet Take 20 mg by mouth 2 times daily.  esomeprazole (NEXIUM) 40 MG capsule Take 40 mg by mouth 2 times daily. ALLERGIES    Allergies   Allergen Reactions    Propofol Anaphylaxis    Bee Venom        FAMILY HISTORY    Family History   Problem Relation Age of Onset    Cancer Father         zollinger shay syndrome    Cancer Son         carcinoid cancer       SOCIAL HISTORY    Social History     Socioeconomic History    Marital status:      Spouse name: None    Number of children: None    Years of education: None    Highest education level: None   Occupational History    None   Tobacco Use    Smoking status: Former Smoker     Packs/day: 2.00     Years: 20.00     Pack years: 40.00     Types: Cigarettes     Quit date: 1991     Years since quittin.5    Smokeless tobacco: Never Used   Substance and Sexual Activity    Alcohol use:  Yes     Alcohol/week: 0.0 standard drinks     Comment: rarely    Drug use: No    Sexual activity: None   Other Topics Concern    None   Social History Narrative    None     Social Determinants of Health     Financial Resource Strain:     Difficulty of Paying Living Expenses: Not on file Food Insecurity:     Worried About Running Out of Food in the Last Year: Not on file    Juan Antonio of Food in the Last Year: Not on file   Transportation Needs:     Lack of Transportation (Medical): Not on file    Lack of Transportation (Non-Medical): Not on file   Physical Activity:     Days of Exercise per Week: Not on file    Minutes of Exercise per Session: Not on file   Stress:     Feeling of Stress : Not on file   Social Connections:     Frequency of Communication with Friends and Family: Not on file    Frequency of Social Gatherings with Friends and Family: Not on file    Attends Restoration Services: Not on file    Active Member of [address] [place] [address] [employer] or Organizations: Not on file    Attends Club or Organization Meetings: Not on file    Marital Status: Not on file   Intimate Partner Violence:     Fear of Current or Ex-Partner: Not on file    Emotionally Abused: Not on file    Physically Abused: Not on file    Sexually Abused: Not on file   Housing Stability:     Unable to Pay for Housing in the Last Year: Not on file    Number of [place] in the Last Year: Not on file    Unstable Housing in the Last Year: Not on file       REVIEW OF SYSTEMS    Constitutional:  Denies fever, chills, weight loss or weakness   Eyes:  Denies photophobia or discharge   HENT:  Denies sore throat or ear pain   Respiratory:  Denies cough or shortness of breath   Cardiovascular:  Denies chest pain, palpitations or swelling   GI:  Denies abdominal pain, nausea, vomiting, or diarrhea   Musculoskeletal:  Denies back pain   Skin:  Denies rash   Neurologic:  Denies headache, focal weakness or sensory changes   Endocrine:  Denies polyuria or polydypsia   Lymphatic:  Denies swollen glands   Psychiatric:  Denies depression, suicidal ideation or homicidal ideation   All systems negative except as marked.      PHYSICAL EXAM    VITAL SIGNS: BP (!) 148/65   Pulse 69   Temp 97.8 °F (36.6 °C) (Oral)   Resp 16   Ht 6' 5\" (1.956 m)   Wt 225 lb (102.1 kg)   SpO2 94%   BMI 26.68 kg/m²    Constitutional:  Well developed, Well nourished, No acute distress, Non-toxic appearance. HENT:  Normocephalic, Atraumatic, Bilateral external ears normal, Oropharynx moist, No oral exudates, Nose normal. Neck- Normal range of motion, No tenderness, Supple, No stridor. Eyes:  PERRL, EOMI, Conjunctiva normal, No discharge. Respiratory:  Normal breath sounds, No respiratory distress, No wheezing, No chest tenderness. Cardiovascular:  Normal heart rate, Normal rhythm, No murmurs, No rubs, No gallops. GI:  Bowel sounds normal, Soft, No tenderness, No masses, No pulsatile masses. :  No CVA tenderness. Musculoskeletal:  Intact distal pulses, No edema, No tenderness, No cyanosis, No clubbing. Good range of motion in all major joints. No tenderness to palpation or major deformities noted. Back- No tenderness. Integument:  Warm, Dry, No erythema, No rash. Lymphatic:  No lymphadenopathy noted. Neurologic:  Alert & oriented x 3, Normal motor function, Normal sensory function, No focal deficits noted. Psychiatric:  Affect normal, Judgment normal, Mood normal.     LABS  Labs Reviewed   CBC WITH AUTO DIFFERENTIAL - Abnormal; Notable for the following components:       Result Value    WBC 15.1 (*)     RBC 3.56 (*)     Hemoglobin 10.9 (*)     Hematocrit 35.7 (*)     .3 (*)     MCHC 30.5 (*)     RDW-SD 47.9 (*)     Segs Absolute 9.8 (*)     Monocytes Absolute 1.8 (*)     Eosinophils Absolute 1.0 (*)     All other components within normal limits   GLOMERULAR FILTRATION RATE, ESTIMATED - Abnormal; Notable for the following components:    Est, Glom Filt Rate 72 (*)     All other components within normal limits   BASIC METABOLIC PANEL W/ REFLEX TO MG FOR LOW K   ANION GAP   OSMOLALITY       ED COURSE & MEDICAL DECISION MAKING    Pertinent Labs & Imaging studies reviewed.  (See chart for details)  Patient's medications for his diabetes needs to be adjusted since patient has decreased oral intake which has triggered his hypoglycemia spells. Patient's Metformin is been put on hold. Patient is advised to follow-up with his primary care physician this morning. He is also advised to come back to the emergency department if the symptoms get worse. Labs were reviewed discussed with the patient    FINAL IMPRESSION    1.  Hypoglycemia             Arvin Lazar MD  11/09/21 0144

## 2021-11-09 NOTE — ED NOTES
Bed: 015A  Expected date: 11/8/21  Expected time: 11:50 PM  Means of arrival: [facility]  Comments:  Hypoglycemia      Andrea Coleman RN  11/08/21 5620

## 2021-11-10 ENCOUNTER — HOSPITAL ENCOUNTER (OUTPATIENT)
Age: 76
Discharge: HOME | End: 2021-11-10
Payer: MEDICARE

## 2021-11-10 ENCOUNTER — HOSPITAL ENCOUNTER (OUTPATIENT)
Dept: GENERAL RADIOLOGY | Age: 76
Discharge: HOME | End: 2021-11-10
Payer: MEDICARE

## 2021-11-10 ENCOUNTER — OFFICE VISIT (OUTPATIENT)
Dept: FAMILY MEDICINE CLINIC | Age: 76
End: 2021-11-10
Payer: MEDICARE

## 2021-11-10 VITALS
TEMPERATURE: 98.3 F | WEIGHT: 237 LBS | OXYGEN SATURATION: 85 % | SYSTOLIC BLOOD PRESSURE: 138 MMHG | BODY MASS INDEX: 28.1 KG/M2 | RESPIRATION RATE: 20 BRPM | HEART RATE: 70 BPM | DIASTOLIC BLOOD PRESSURE: 66 MMHG

## 2021-11-10 DIAGNOSIS — E11.9 TYPE 2 DIABETES MELLITUS WITHOUT COMPLICATION, WITHOUT LONG-TERM CURRENT USE OF INSULIN (HCC): ICD-10-CM

## 2021-11-10 DIAGNOSIS — J18.9 PNEUMONIA OF RIGHT LUNG DUE TO INFECTIOUS ORGANISM, UNSPECIFIED PART OF LUNG: ICD-10-CM

## 2021-11-10 DIAGNOSIS — D75.89 MACROCYTOSIS: ICD-10-CM

## 2021-11-10 DIAGNOSIS — Z20.822 SUSPECTED COVID-19 VIRUS INFECTION: ICD-10-CM

## 2021-11-10 DIAGNOSIS — D64.9 ANEMIA, UNSPECIFIED TYPE: ICD-10-CM

## 2021-11-10 DIAGNOSIS — R53.83 FATIGUE, UNSPECIFIED TYPE: ICD-10-CM

## 2021-11-10 DIAGNOSIS — R06.02 SHORTNESS OF BREATH: Primary | ICD-10-CM

## 2021-11-10 LAB
ALBUMIN SERPL-MCNC: 4 G/DL (ref 3.5–5.1)
ALP BLD-CCNC: 95 U/L (ref 38–126)
ALT SERPL-CCNC: 12 U/L (ref 11–66)
ANION GAP SERPL CALCULATED.3IONS-SCNC: 11 MEQ/L (ref 8–16)
AST SERPL-CCNC: 15 U/L (ref 5–40)
AVERAGE GLUCOSE: 147 MG/DL (ref 70–126)
BASOPHILS # BLD: 0.7 %
BASOPHILS ABSOLUTE: 0.1 THOU/MM3 (ref 0–0.1)
BILIRUB SERPL-MCNC: 0.4 MG/DL (ref 0.3–1.2)
BUN BLDV-MCNC: 13 MG/DL (ref 7–22)
CALCIUM SERPL-MCNC: 10 MG/DL (ref 8.5–10.5)
CHLORIDE BLD-SCNC: 101 MEQ/L (ref 98–111)
CO2: 30 MEQ/L (ref 23–33)
CREAT SERPL-MCNC: 0.9 MG/DL (ref 0.4–1.2)
EOSINOPHIL # BLD: 7.3 %
EOSINOPHILS ABSOLUTE: 0.8 THOU/MM3 (ref 0–0.4)
ERYTHROCYTE [DISTWIDTH] IN BLOOD BY AUTOMATED COUNT: 12.8 % (ref 11.5–14.5)
ERYTHROCYTE [DISTWIDTH] IN BLOOD BY AUTOMATED COUNT: 47.4 FL (ref 35–45)
FOLATE: 14.9 NG/ML (ref 4.8–24.2)
GFR SERPL CREATININE-BSD FRML MDRD: 82 ML/MIN/1.73M2
GLUCOSE BLD-MCNC: 48 MG/DL (ref 70–108)
HBA1C MFR BLD: 6.9 % (ref 4.4–6.4)
HCT VFR BLD CALC: 36.6 % (ref 42–52)
HEMOGLOBIN: 11 GM/DL (ref 14–18)
IMMATURE GRANS (ABS): 0.04 THOU/MM3 (ref 0–0.07)
IMMATURE GRANULOCYTES: 0.4 %
IRON: 37 UG/DL (ref 65–195)
LYMPHOCYTES # BLD: 25.8 %
LYMPHOCYTES ABSOLUTE: 2.8 THOU/MM3 (ref 1–4.8)
Lab: NORMAL
MCH RBC QN AUTO: 30.2 PG (ref 26–33)
MCHC RBC AUTO-ENTMCNC: 30.1 GM/DL (ref 32.2–35.5)
MCV RBC AUTO: 100.5 FL (ref 80–94)
MONOCYTES # BLD: 13.1 %
MONOCYTES ABSOLUTE: 1.4 THOU/MM3 (ref 0.4–1.3)
NUCLEATED RED BLOOD CELLS: 0 /100 WBC
PERFORMING INSTRUMENT: NORMAL
PLATELET # BLD: 305 THOU/MM3 (ref 130–400)
PMV BLD AUTO: 11.5 FL (ref 9.4–12.4)
POTASSIUM SERPL-SCNC: 4.9 MEQ/L (ref 3.5–5.2)
QC PASS/FAIL: NORMAL
RBC # BLD: 3.64 MILL/MM3 (ref 4.7–6.1)
SARS-COV-2, POC: NORMAL
SEG NEUTROPHILS: 52.7 %
SEGMENTED NEUTROPHILS ABSOLUTE COUNT: 5.8 THOU/MM3 (ref 1.8–7.7)
SODIUM BLD-SCNC: 142 MEQ/L (ref 135–145)
T4 FREE: 1.24 NG/DL (ref 0.93–1.76)
TOTAL IRON BINDING CAPACITY: 435 UG/DL (ref 171–450)
TOTAL PROTEIN: 6.8 G/DL (ref 6.1–8)
TSH SERPL DL<=0.05 MIU/L-ACNC: 4.33 UIU/ML (ref 0.4–4.2)
VITAMIN B-12: 353 PG/ML (ref 211–911)
WBC # BLD: 11 THOU/MM3 (ref 4.8–10.8)

## 2021-11-10 PROCEDURE — 1036F TOBACCO NON-USER: CPT | Performed by: FAMILY MEDICINE

## 2021-11-10 PROCEDURE — G8427 DOCREV CUR MEDS BY ELIG CLIN: HCPCS | Performed by: FAMILY MEDICINE

## 2021-11-10 PROCEDURE — 36415 COLL VENOUS BLD VENIPUNCTURE: CPT

## 2021-11-10 PROCEDURE — 87426 SARSCOV CORONAVIRUS AG IA: CPT | Performed by: FAMILY MEDICINE

## 2021-11-10 PROCEDURE — G8484 FLU IMMUNIZE NO ADMIN: HCPCS | Performed by: FAMILY MEDICINE

## 2021-11-10 PROCEDURE — 83036 HEMOGLOBIN GLYCOSYLATED A1C: CPT

## 2021-11-10 PROCEDURE — 71046 X-RAY EXAM CHEST 2 VIEWS: CPT

## 2021-11-10 PROCEDURE — 85025 COMPLETE CBC W/AUTO DIFF WBC: CPT

## 2021-11-10 PROCEDURE — 82607 VITAMIN B-12: CPT

## 2021-11-10 PROCEDURE — 99214 OFFICE O/P EST MOD 30 MIN: CPT | Performed by: FAMILY MEDICINE

## 2021-11-10 PROCEDURE — 84439 ASSAY OF FREE THYROXINE: CPT

## 2021-11-10 PROCEDURE — 83550 IRON BINDING TEST: CPT

## 2021-11-10 PROCEDURE — 93000 ELECTROCARDIOGRAM COMPLETE: CPT | Performed by: FAMILY MEDICINE

## 2021-11-10 PROCEDURE — 1123F ACP DISCUSS/DSCN MKR DOCD: CPT | Performed by: FAMILY MEDICINE

## 2021-11-10 PROCEDURE — 82746 ASSAY OF FOLIC ACID SERUM: CPT

## 2021-11-10 PROCEDURE — G8417 CALC BMI ABV UP PARAM F/U: HCPCS | Performed by: FAMILY MEDICINE

## 2021-11-10 PROCEDURE — 4040F PNEUMOC VAC/ADMIN/RCVD: CPT | Performed by: FAMILY MEDICINE

## 2021-11-10 PROCEDURE — 84443 ASSAY THYROID STIM HORMONE: CPT

## 2021-11-10 PROCEDURE — 80053 COMPREHEN METABOLIC PANEL: CPT

## 2021-11-10 PROCEDURE — 83540 ASSAY OF IRON: CPT

## 2021-11-10 RX ORDER — AZITHROMYCIN 500 MG/1
500 TABLET, FILM COATED ORAL DAILY
Qty: 5 TABLET | Refills: 0 | Status: SHIPPED | OUTPATIENT
Start: 2021-11-10 | End: 2021-11-15

## 2021-11-10 RX ORDER — PEN NEEDLE, DIABETIC 29 G X1/2"
NEEDLE, DISPOSABLE MISCELLANEOUS
COMMUNITY
Start: 2021-08-06

## 2021-11-10 SDOH — ECONOMIC STABILITY: FOOD INSECURITY: WITHIN THE PAST 12 MONTHS, THE FOOD YOU BOUGHT JUST DIDN'T LAST AND YOU DIDN'T HAVE MONEY TO GET MORE.: NEVER TRUE

## 2021-11-10 SDOH — ECONOMIC STABILITY: FOOD INSECURITY: WITHIN THE PAST 12 MONTHS, YOU WORRIED THAT YOUR FOOD WOULD RUN OUT BEFORE YOU GOT MONEY TO BUY MORE.: NEVER TRUE

## 2021-11-10 ASSESSMENT — SOCIAL DETERMINANTS OF HEALTH (SDOH): HOW HARD IS IT FOR YOU TO PAY FOR THE VERY BASICS LIKE FOOD, HOUSING, MEDICAL CARE, AND HEATING?: NOT HARD AT ALL

## 2021-11-15 ENCOUNTER — PATIENT MESSAGE (OUTPATIENT)
Dept: FAMILY MEDICINE CLINIC | Age: 76
End: 2021-11-15

## 2021-11-15 RX ORDER — CEFUROXIME AXETIL 250 MG/1
250 TABLET ORAL 2 TIMES DAILY
Qty: 20 TABLET | Refills: 0 | Status: ON HOLD | OUTPATIENT
Start: 2021-11-15 | End: 2021-11-23 | Stop reason: HOSPADM

## 2021-11-15 RX ORDER — FAMOTIDINE 20 MG/1
20 TABLET, FILM COATED ORAL 2 TIMES DAILY
Qty: 28 TABLET | Refills: 0 | Status: ON HOLD | OUTPATIENT
Start: 2021-11-15 | End: 2021-11-21

## 2021-11-15 NOTE — TELEPHONE ENCOUNTER
White blood cell count was elevated, concerned the 1st antibiotic did not finish off his infection thus the 2nd antibiotic. If oxygen levels are not sustaining above 90% with the setting on anything over 4 L I would recommend he be evaluated in the ER. Anemia is mild and not the source of current symptoms.

## 2021-11-15 NOTE — TELEPHONE ENCOUNTER
Add ceftin 250 bid x 10 days. Pepcid 20 mg bid x 14 days. Titrate oxygen to keep saO2 above 90.   If sx worsening, rec ED

## 2021-11-17 ASSESSMENT — ENCOUNTER SYMPTOMS
NAUSEA: 0
CHEST TIGHTNESS: 1
EYE PAIN: 0
SHORTNESS OF BREATH: 1
ABDOMINAL PAIN: 0
COUGH: 1
RHINORRHEA: 0
DIARRHEA: 0
SORE THROAT: 0
SINUS PRESSURE: 0
CONSTIPATION: 0
ABDOMINAL DISTENTION: 0

## 2021-11-17 NOTE — PROGRESS NOTES
[address] [address]  Dept: [phone]  Dept Fax: [phone]  Loc: [phone]  PROGRESS NOTE      VisitDate: 11/10/2021    Germania Anglin is a 67 y.o. male who presents today for:     Chief Complaint   Patient presents with    Fatigue     shortness of breath, muscle pain/aches. He bruise very easily and has some dizziness. He went to [facility] ED 11/08/2021 for low Blood sugar. Subjective:  HPI  Patient with a history of COVID-19 infection in the past and pneumonia comes in with shortness of breath chest wall discomfort that has been worsening. Recently seen in ER for low blood sugar. Had ongoing fatigue. Has history of anemia that is macrocytic. Does not feel well overall concerned he is feeling similar to his previous episodes of pneumonia    Review of Systems   Constitutional: Positive for fatigue and fever. Negative for appetite change. HENT: Positive for congestion. Negative for ear pain, postnasal drip, rhinorrhea, sinus pressure and sore throat. Eyes: Negative for pain and visual disturbance. Respiratory: Positive for cough, chest tightness and shortness of breath. Cardiovascular: Negative for chest pain. Gastrointestinal: Negative for abdominal distention, abdominal pain, constipation, diarrhea and nausea. Genitourinary: Negative for dysuria, frequency and urgency. Musculoskeletal: Negative for arthralgias. Skin: Negative for rash. Neurological: Positive for weakness. Negative for dizziness.      Past Medical History:   Diagnosis Date    Arthritis     Cancer ([facility] [address] 1990    Spelenectomy, cholycystectomy,partial pancrease, partial stomache, lymphnodes    COPD (chronic obstructive pulmonary disease) (HCC)     Hypertension     Nausea & vomiting     Type II or unspecified type diabetes mellitus without mention of complication, not stated as uncontrolled       Past Surgical History: Procedure Laterality Date    CHOLECYSTECTOMY      COLONOSCOPY  3/16/12    [facility]    CYST REMOVAL      FROM NECK AND BACK    HERNIA REPAIR      OTHER SURGICAL HISTORY  2013    RIGHT POST. CERVICAL LAMINOTOMIES AND FORAMINOTOMIES    PANCREAS SURGERY      partial removal     PARATHYROID GLAND SURGERY      PILONIDAL CYST EXCISION      SPLENECTOMY, TOTAL       Family History   Problem Relation Age of Onset    Cancer Father         zollinger shay syndrome    Cancer Son         carcinoid cancer     Social History     Tobacco Use    Smoking status: Former Smoker     Packs/day: 2.00     Years: 20.00     Pack years: 40.00     Types: Cigarettes     Quit date: 1991     Years since quittin.5    Smokeless tobacco: Never Used   Substance Use Topics    Alcohol use:  Yes     Alcohol/week: 0.0 standard drinks     Comment: rarely      Current Outpatient Medications   Medication Sig Dispense Refill    BD INSULIN SYRINGE U/F 31G X \" 0.5 ML MISC USE AS DIRECTED UNDER THE SKIN TWICE DAILY      fluticasone-salmeterol (ADVAIR DISKUS) 250-50 MCG/DOSE AEPB Inhale 1 puff into the lungs 2 times daily 3 each 3    ipratropium-albuterol (DUONEB) 0.5-2.5 (3) MG/3ML SOLN nebulizer solution Inhale 3 mLs into the lungs every 4 hours as needed for Shortness of Breath 360 mL 0    atorvastatin (LIPITOR) 10 MG tablet Take 1 tablet by mouth daily      doxazosin (CARDURA) 8 MG tablet Take 1 tablet by mouth daily      albuterol sulfate (PROAIR RESPICLICK) 772 (90 Base) MCG/ACT aerosol powder inhalation Inhale 2 puffs into the lungs every 6 hours as needed for Wheezing or Shortness of Breath 3 Inhaler 3    OXYGEN Inhale into the lungs 2 L qhs  per Elizabeth Rosario      glipiZIDE (GLIPIZIDE XL) 10 MG extended release tablet Take 10 mg by mouth 2 times daily      sildenafil (VIAGRA) 100 MG tablet Take 100 mg by mouth as needed for Erectile Dysfunction      ALPRAZolam (XANAX) 0.25 MG tablet Take 0.25 mg by mouth nightly as needed for Sleep.  TRAVATAN Z 0.004 % SOLN ophthalmic solution Place 1 drop into both eyes nightly.  Insulin Lispro Prot & Lispro (HUMALOG MIX 75/25 SC) Inject 30 Units into the skin 2 times daily       quinapril (ACCUPRIL) 20 MG tablet Take 20 mg by mouth 2 times daily.  esomeprazole (NEXIUM) 40 MG capsule Take 40 mg by mouth 2 times daily.  cefUROXime (CEFTIN) 250 MG tablet Take 1 tablet by mouth 2 times daily for 10 days 20 tablet 0    famotidine (PEPCID) 20 MG tablet Take 1 tablet by mouth 2 times daily for 14 days 28 tablet 0     No current facility-administered medications for this visit. Allergies   Allergen Reactions    Propofol Anaphylaxis    Bee Venom      Health Maintenance   Topic Date Due    Hepatitis C screen  Never done    Meningococcal (ACWY) vaccine (1 - Risk start 2-23 months series) Never done    Hib vaccine (1 of 1 - Risk 1-dose series) Never done    Meningococcal B vaccine (1 of 4 - Increased Risk Bexsero 2-dose series) Never done    DTaP/Tdap/Td vaccine (1 - Tdap) Never done    Shingles Vaccine (2 of 3) 05/26/2016    Lipid screen  02/11/2022    Potassium monitoring  11/10/2022    Creatinine monitoring  11/10/2022    Flu vaccine  Completed    Pneumococcal 65+ yrs at Risk Vaccine  Completed    COVID-19 Vaccine  Completed    Hepatitis A vaccine  Aged Out         Objective:     Physical Exam  Constitutional:       General: He is not in acute distress. Appearance: He is well-developed. He is not diaphoretic. HENT:      Head: Normocephalic and atraumatic. Right Ear: External ear normal.      Left Ear: External ear normal.   Eyes:      Conjunctiva/sclera: Conjunctivae normal.   Neck:      Vascular: No JVD. Cardiovascular:      Rate and Rhythm: Normal rate and regular rhythm. Heart sounds: Normal heart sounds. Pulmonary:      Effort: Pulmonary effort is normal.      Breath sounds: Rales present. No wheezing.    Musculoskeletal: General: No tenderness. Skin:     General: Skin is warm and dry. Coloration: Skin is not pale. Neurological:      Mental Status: He is alert and oriented to person, place, and time. /66   Pulse 70   Temp 98.3 °F (36.8 °C) (Oral)   Resp 20   Wt 237 lb (107.5 kg)   SpO2 (!) 85%   BMI 28.10 kg/m²       Impression/Plan:  1. Shortness of breath    2. Suspected COVID-19 virus infection    3. Type 2 diabetes mellitus without complication, without long-term current use of insulin ([facility] [address]    4. Pneumonia of right lung due to infectious organism, unspecified part of lung    5. Fatigue, unspecified type    6. Macrocytosis    7. Anemia, unspecified type      Requested Prescriptions     Signed Prescriptions Disp Refills    azithromycin (ZITHROMAX) 500 MG tablet 5 tablet 0     Sig: Take 1 tablet by mouth daily for 5 days     Orders Placed This Encounter   Procedures    XR CHEST (2 VW)     Standing Status:   Future     Number of Occurrences:   1     Standing Expiration Date:   11/10/2022    Comprehensive Metabolic Panel     Standing Status:   Future     Number of Occurrences:   1     Standing Expiration Date:   11/10/2022    Hemoglobin A1C     Standing Status:   Future     Number of Occurrences:   1     Standing Expiration Date:   11/10/2022    CBC Auto Differential     Standing Status:   Future     Number of Occurrences:   1     Standing Expiration Date:   11/10/2022    T4, Free     Standing Status:   Future     Number of Occurrences:   1     Standing Expiration Date:   11/10/2022    TSH without Reflex     Standing Status:   Future     Number of Occurrences:   1     Standing Expiration Date:   11/10/2022    Iron and TIBC     Standing Status:   Future     Number of Occurrences:   1     Standing Expiration Date:   11/10/2022     Order Specific Question:   Is Patient Fasting? Answer:   yes     Order Specific Question:   No of Hours?      Answer:   15    Vitamin B12 & Folate     Standing Status: Future     Number of Occurrences:   1     Standing Expiration Date:   11/10/2022    POCT COVID-19, Antigen     Order Specific Question:   Is this test for diagnosis or screening? Answer:   Diagnosis of ill patient     Order Specific Question:   Symptomatic for COVID-19 as defined by CDC? Answer:   Yes     Order Specific Question:   Date of Symptom Onset     Answer:   10/27/2021     Order Specific Question:   Hospitalized for COVID-19? Answer:   No     Order Specific Question:   Admitted to ICU for COVID-19? Answer:   No     Order Specific Question:   Employed in healthcare setting? Answer:   No     Order Specific Question:   Resident in a congregate (group) care setting? Answer:   No     Order Specific Question:   Pregnant: Answer:   No     Order Specific Question:   Previously tested for COVID-19? Answer: Yes    EKG 12 lead     Standing Status:   Future     Number of Occurrences:   1     Standing Expiration Date:   1/9/2022     Order Specific Question:   Reason for Exam?     Answer:   Shortness of Breath       Patient giveneducational materials - see patient instructions. Discussed use, benefit, and side effects of prescribed medications. All patient questions answered. Pt voiced understanding. Reviewed health maintenance. Patient agreedwith treatment plan. Follow up as directed. **This report has been created using voice recognition software. It may contain minor errorswhich are inherent in voice recognition technology. **       Electronically signed by Maren Barragan MD on 11/17/2021 at 6:40 AM

## 2021-11-19 ENCOUNTER — TELEPHONE (OUTPATIENT)
Dept: PULMONOLOGY | Age: 76
End: 2021-11-19

## 2021-11-19 NOTE — TELEPHONE ENCOUNTER
Patient is asking if he could get an portable oxygen machine since once he is up he drops to about 75 and when he uses it at at night he is good . He stated he has bacterial  Pneumonia  and he is on antibiotics . Please advise.

## 2021-11-19 NOTE — TELEPHONE ENCOUNTER
I called and spoke to patient and he wants to come in on Monday.  I set up appt for 3pm on 11/22 in [facility]

## 2021-11-19 NOTE — TELEPHONE ENCOUNTER
Can not just prescribe oxygen without documentation with in office/ hospital testing. We have no way of knowing what liter flow he needs  He has 2 options: come in to pulmonary clinic for 6 min walk, not sure if someone has openings in [place] today or will have to wait till Monday or call his PCP to see if there is any thing they can do since they are the one's that got the CXR and diagnosed him.    If unable to get evaluated today , he should limit his activity through the weekend, but still making sure to get up every 2 hours to move a little and do ankle pumps when laying to help prevent blood clotting  Monitor pulse ox readings at rest, if staying <88% needs ER evaluation

## 2021-11-21 ENCOUNTER — HOSPITAL ENCOUNTER (INPATIENT)
Age: 76
LOS: 2 days | Discharge: HOME | DRG: 189 | End: 2021-11-23
Attending: HOSPITALIST | Admitting: HOSPITALIST
Payer: MEDICARE

## 2021-11-21 ENCOUNTER — APPOINTMENT (OUTPATIENT)
Dept: CT IMAGING | Age: 76
DRG: 189 | End: 2021-11-21
Payer: MEDICARE

## 2021-11-21 DIAGNOSIS — R06.89 DYSPNEA AND RESPIRATORY ABNORMALITIES: ICD-10-CM

## 2021-11-21 DIAGNOSIS — R09.02 HYPOXIA: Primary | ICD-10-CM

## 2021-11-21 DIAGNOSIS — R60.0 LEG EDEMA: ICD-10-CM

## 2021-11-21 DIAGNOSIS — R06.00 DYSPNEA AND RESPIRATORY ABNORMALITIES: ICD-10-CM

## 2021-11-21 DIAGNOSIS — J96.11 CHRONIC RESPIRATORY FAILURE WITH HYPOXIA (HCC): ICD-10-CM

## 2021-11-21 LAB
ABSOLUTE RETIC #: 49 THOU/MM3 (ref 20–115)
ALBUMIN SERPL-MCNC: 3.5 G/DL (ref 3.5–5.1)
ALLEN TEST: POSITIVE
ALP BLD-CCNC: 83 U/L (ref 38–126)
ALT SERPL-CCNC: 10 U/L (ref 11–66)
ANION GAP SERPL CALCULATED.3IONS-SCNC: 8 MEQ/L (ref 8–16)
AST SERPL-CCNC: 12 U/L (ref 5–40)
BASE EXCESS (CALCULATED): 6.9 MMOL/L (ref -2.5–2.5)
BASOPHILS # BLD: 1 %
BASOPHILS ABSOLUTE: 0.1 THOU/MM3 (ref 0–0.1)
BILIRUB SERPL-MCNC: 0.5 MG/DL (ref 0.3–1.2)
BUN BLDV-MCNC: 14 MG/DL (ref 7–22)
CALCIUM SERPL-MCNC: 10 MG/DL (ref 8.5–10.5)
CHLORIDE BLD-SCNC: 101 MEQ/L (ref 98–111)
CO2: 30 MEQ/L (ref 23–33)
COLLECTED BY:: ABNORMAL
CREAT SERPL-MCNC: 0.9 MG/DL (ref 0.4–1.2)
DEVICE: ABNORMAL
EKG ATRIAL RATE: 75 BPM
EKG P AXIS: 30 DEGREES
EKG P-R INTERVAL: 214 MS
EKG Q-T INTERVAL: 344 MS
EKG QRS DURATION: 84 MS
EKG QTC CALCULATION (BAZETT): 384 MS
EKG R AXIS: 12 DEGREES
EKG T AXIS: 65 DEGREES
EKG VENTRICULAR RATE: 75 BPM
EOSINOPHIL # BLD: 8.3 %
EOSINOPHILS ABSOLUTE: 0.9 THOU/MM3 (ref 0–0.4)
ERYTHROCYTE [DISTWIDTH] IN BLOOD BY AUTOMATED COUNT: 12.8 % (ref 11.5–14.5)
ERYTHROCYTE [DISTWIDTH] IN BLOOD BY AUTOMATED COUNT: 47.5 FL (ref 35–45)
FOLATE: 11.7 NG/ML (ref 4.8–24.2)
GFR SERPL CREATININE-BSD FRML MDRD: 82 ML/MIN/1.73M2
GLUCOSE BLD-MCNC: 103 MG/DL (ref 70–108)
GLUCOSE BLD-MCNC: 148 MG/DL (ref 70–108)
GLUCOSE BLD-MCNC: 72 MG/DL (ref 70–108)
HCO3: 35 MMOL/L (ref 23–28)
HCT VFR BLD CALC: 34.5 % (ref 42–52)
HEMOGLOBIN: 10.2 GM/DL (ref 14–18)
IFIO2: 4
IMMATURE GRANS (ABS): 0.03 THOU/MM3 (ref 0–0.07)
IMMATURE GRANULOCYTES: 0.3 %
IMMATURE RETIC FRACT: 10.2 % (ref 2.3–13.4)
IRON SATURATION: 20 % (ref 20–50)
IRON: 78 UG/DL (ref 65–195)
LYMPHOCYTES # BLD: 19.9 %
LYMPHOCYTES ABSOLUTE: 2.2 THOU/MM3 (ref 1–4.8)
MCH RBC QN AUTO: 29.8 PG (ref 26–33)
MCHC RBC AUTO-ENTMCNC: 29.6 GM/DL (ref 32.2–35.5)
MCV RBC AUTO: 100.9 FL (ref 80–94)
MONOCYTES # BLD: 13.3 %
MONOCYTES ABSOLUTE: 1.4 THOU/MM3 (ref 0.4–1.3)
NUCLEATED RED BLOOD CELLS: 0 /100 WBC
O2 SATURATION: 96 %
OSMOLALITY CALCULATION: 278.3 MOSMOL/KG (ref 275–300)
PCO2: 65 MMHG (ref 35–45)
PH BLOOD GAS: 7.33 (ref 7.35–7.45)
PLATELET # BLD: 275 THOU/MM3 (ref 130–400)
PMV BLD AUTO: 9.9 FL (ref 9.4–12.4)
PO2: 90 MMHG (ref 71–104)
POTASSIUM SERPL-SCNC: 4.4 MEQ/L (ref 3.5–5.2)
PRO-BNP: 113.4 PG/ML (ref 0–1800)
PROCALCITONIN: 0.05 NG/ML (ref 0.01–0.09)
RBC # BLD: 3.42 MILL/MM3 (ref 4.7–6.1)
RETIC HEMOGLOBIN: 30 PG (ref 28.2–35.7)
RETICULOCYTE ABSOLUTE COUNT: 1.4 % (ref 0.5–2)
SARS-COV-2, NAAT: NOT DETECTED
SEG NEUTROPHILS: 57.2 %
SEGMENTED NEUTROPHILS ABSOLUTE COUNT: 6.2 THOU/MM3 (ref 1.8–7.7)
SODIUM BLD-SCNC: 139 MEQ/L (ref 135–145)
SOURCE, BLOOD GAS: ABNORMAL
TOTAL IRON BINDING CAPACITY: 397 UG/DL (ref 171–450)
TOTAL PROTEIN: 6.6 G/DL (ref 6.1–8)
TROPONIN T: < 0.01 NG/ML
TSH SERPL DL<=0.05 MIU/L-ACNC: 3.39 UIU/ML (ref 0.4–4.2)
VITAMIN B-12: 344 PG/ML (ref 211–911)
WBC # BLD: 10.9 THOU/MM3 (ref 4.8–10.8)

## 2021-11-21 PROCEDURE — 93010 ELECTROCARDIOGRAM REPORT: CPT | Performed by: NUCLEAR MEDICINE

## 2021-11-21 PROCEDURE — 99285 EMERGENCY DEPT VISIT HI MDM: CPT

## 2021-11-21 PROCEDURE — 36600 WITHDRAWAL OF ARTERIAL BLOOD: CPT

## 2021-11-21 PROCEDURE — 71275 CT ANGIOGRAPHY CHEST: CPT

## 2021-11-21 PROCEDURE — 6360000004 HC RX CONTRAST MEDICATION: Performed by: NURSE PRACTITIONER

## 2021-11-21 PROCEDURE — 2700000000 HC OXYGEN THERAPY PER DAY

## 2021-11-21 PROCEDURE — 83550 IRON BINDING TEST: CPT

## 2021-11-21 PROCEDURE — 85025 COMPLETE CBC W/AUTO DIFF WBC: CPT

## 2021-11-21 PROCEDURE — 83880 ASSAY OF NATRIURETIC PEPTIDE: CPT

## 2021-11-21 PROCEDURE — 93005 ELECTROCARDIOGRAM TRACING: CPT | Performed by: NURSE PRACTITIONER

## 2021-11-21 PROCEDURE — 82803 BLOOD GASES ANY COMBINATION: CPT

## 2021-11-21 PROCEDURE — 84443 ASSAY THYROID STIM HORMONE: CPT

## 2021-11-21 PROCEDURE — 80053 COMPREHEN METABOLIC PANEL: CPT

## 2021-11-21 PROCEDURE — 83540 ASSAY OF IRON: CPT

## 2021-11-21 PROCEDURE — 6370000000 HC RX 637 (ALT 250 FOR IP): Performed by: STUDENT IN AN ORGANIZED HEALTH CARE EDUCATION/TRAINING PROGRAM

## 2021-11-21 PROCEDURE — 94640 AIRWAY INHALATION TREATMENT: CPT

## 2021-11-21 PROCEDURE — 36415 COLL VENOUS BLD VENIPUNCTURE: CPT

## 2021-11-21 PROCEDURE — 82607 VITAMIN B-12: CPT

## 2021-11-21 PROCEDURE — 82746 ASSAY OF FOLIC ACID SERUM: CPT

## 2021-11-21 PROCEDURE — 2580000003 HC RX 258: Performed by: STUDENT IN AN ORGANIZED HEALTH CARE EDUCATION/TRAINING PROGRAM

## 2021-11-21 PROCEDURE — 84484 ASSAY OF TROPONIN QUANT: CPT

## 2021-11-21 PROCEDURE — 82948 REAGENT STRIP/BLOOD GLUCOSE: CPT

## 2021-11-21 PROCEDURE — 94761 N-INVAS EAR/PLS OXIMETRY MLT: CPT

## 2021-11-21 PROCEDURE — 84145 PROCALCITONIN (PCT): CPT

## 2021-11-21 PROCEDURE — 1200000003 HC TELEMETRY R&B

## 2021-11-21 PROCEDURE — 6360000002 HC RX W HCPCS: Performed by: STUDENT IN AN ORGANIZED HEALTH CARE EDUCATION/TRAINING PROGRAM

## 2021-11-21 PROCEDURE — 85046 RETICYTE/HGB CONCENTRATE: CPT

## 2021-11-21 PROCEDURE — 99223 1ST HOSP IP/OBS HIGH 75: CPT | Performed by: HOSPITALIST

## 2021-11-21 PROCEDURE — 87635 SARS-COV-2 COVID-19 AMP PRB: CPT

## 2021-11-21 RX ORDER — ONDANSETRON 2 MG/ML
4 INJECTION INTRAMUSCULAR; INTRAVENOUS EVERY 6 HOURS PRN
Status: DISCONTINUED | OUTPATIENT
Start: 2021-11-21 | End: 2021-11-23 | Stop reason: HOSPADM

## 2021-11-21 RX ORDER — PANTOPRAZOLE SODIUM 40 MG/1
40 TABLET, DELAYED RELEASE ORAL
Status: DISCONTINUED | OUTPATIENT
Start: 2021-11-21 | End: 2021-11-23 | Stop reason: HOSPADM

## 2021-11-21 RX ORDER — GLIPIZIDE 10 MG/1
10 TABLET ORAL 2 TIMES DAILY
Status: DISCONTINUED | OUTPATIENT
Start: 2021-11-21 | End: 2021-11-23 | Stop reason: HOSPADM

## 2021-11-21 RX ORDER — ATORVASTATIN CALCIUM 10 MG/1
10 TABLET, FILM COATED ORAL NIGHTLY
Status: DISCONTINUED | OUTPATIENT
Start: 2021-11-21 | End: 2021-11-23 | Stop reason: HOSPADM

## 2021-11-21 RX ORDER — BUDESONIDE AND FORMOTEROL FUMARATE DIHYDRATE 80; 4.5 UG/1; UG/1
2 AEROSOL RESPIRATORY (INHALATION) 2 TIMES DAILY
Status: DISCONTINUED | OUTPATIENT
Start: 2021-11-21 | End: 2021-11-23 | Stop reason: HOSPADM

## 2021-11-21 RX ORDER — POLYETHYLENE GLYCOL 3350 17 G/17G
17 POWDER, FOR SOLUTION ORAL DAILY PRN
Status: DISCONTINUED | OUTPATIENT
Start: 2021-11-21 | End: 2021-11-23 | Stop reason: HOSPADM

## 2021-11-21 RX ORDER — IPRATROPIUM BROMIDE AND ALBUTEROL SULFATE 2.5; .5 MG/3ML; MG/3ML
1 SOLUTION RESPIRATORY (INHALATION) EVERY 4 HOURS PRN
Status: DISCONTINUED | OUTPATIENT
Start: 2021-11-21 | End: 2021-11-23 | Stop reason: HOSPADM

## 2021-11-21 RX ORDER — DEXTROSE MONOHYDRATE 50 MG/ML
100 INJECTION, SOLUTION INTRAVENOUS PRN
Status: DISCONTINUED | OUTPATIENT
Start: 2021-11-21 | End: 2021-11-23 | Stop reason: HOSPADM

## 2021-11-21 RX ORDER — NICOTINE POLACRILEX 4 MG
15 LOZENGE BUCCAL PRN
Status: DISCONTINUED | OUTPATIENT
Start: 2021-11-21 | End: 2021-11-23 | Stop reason: HOSPADM

## 2021-11-21 RX ORDER — PREDNISONE 20 MG/1
40 TABLET ORAL DAILY
Status: DISCONTINUED | OUTPATIENT
Start: 2021-11-21 | End: 2021-11-23 | Stop reason: HOSPADM

## 2021-11-21 RX ORDER — SODIUM CHLORIDE 0.9 % (FLUSH) 0.9 %
5-40 SYRINGE (ML) INJECTION PRN
Status: DISCONTINUED | OUTPATIENT
Start: 2021-11-21 | End: 2021-11-23 | Stop reason: HOSPADM

## 2021-11-21 RX ORDER — LISINOPRIL 20 MG/1
20 TABLET ORAL DAILY
Status: DISCONTINUED | OUTPATIENT
Start: 2021-11-22 | End: 2021-11-23 | Stop reason: HOSPADM

## 2021-11-21 RX ORDER — ONDANSETRON 4 MG/1
4 TABLET, ORALLY DISINTEGRATING ORAL EVERY 8 HOURS PRN
Status: DISCONTINUED | OUTPATIENT
Start: 2021-11-21 | End: 2021-11-23 | Stop reason: HOSPADM

## 2021-11-21 RX ORDER — SODIUM CHLORIDE 9 MG/ML
25 INJECTION, SOLUTION INTRAVENOUS PRN
Status: DISCONTINUED | OUTPATIENT
Start: 2021-11-21 | End: 2021-11-23 | Stop reason: HOSPADM

## 2021-11-21 RX ORDER — SODIUM CHLORIDE 0.9 % (FLUSH) 0.9 %
5-40 SYRINGE (ML) INJECTION EVERY 12 HOURS SCHEDULED
Status: DISCONTINUED | OUTPATIENT
Start: 2021-11-21 | End: 2021-11-23 | Stop reason: HOSPADM

## 2021-11-21 RX ORDER — DEXTROSE MONOHYDRATE 25 G/50ML
12.5 INJECTION, SOLUTION INTRAVENOUS PRN
Status: DISCONTINUED | OUTPATIENT
Start: 2021-11-21 | End: 2021-11-23 | Stop reason: HOSPADM

## 2021-11-21 RX ORDER — ACETAMINOPHEN 325 MG/1
650 TABLET ORAL EVERY 6 HOURS PRN
Status: DISCONTINUED | OUTPATIENT
Start: 2021-11-21 | End: 2021-11-23 | Stop reason: HOSPADM

## 2021-11-21 RX ORDER — ACETAMINOPHEN 650 MG/1
650 SUPPOSITORY RECTAL EVERY 6 HOURS PRN
Status: DISCONTINUED | OUTPATIENT
Start: 2021-11-21 | End: 2021-11-23 | Stop reason: HOSPADM

## 2021-11-21 RX ORDER — DOXAZOSIN MESYLATE 4 MG/1
8 TABLET ORAL DAILY
Status: DISCONTINUED | OUTPATIENT
Start: 2021-11-21 | End: 2021-11-23 | Stop reason: HOSPADM

## 2021-11-21 RX ADMIN — SODIUM CHLORIDE, PRESERVATIVE FREE 10 ML: 5 INJECTION INTRAVENOUS at 22:01

## 2021-11-21 RX ADMIN — PREDNISONE 40 MG: 20 TABLET ORAL at 15:31

## 2021-11-21 RX ADMIN — TIOTROPIUM BROMIDE INHALATION SPRAY 2 PUFF: 3.12 SPRAY, METERED RESPIRATORY (INHALATION) at 15:43

## 2021-11-21 RX ADMIN — IOPAMIDOL 80 ML: 755 INJECTION, SOLUTION INTRAVENOUS at 10:53

## 2021-11-21 RX ADMIN — ENOXAPARIN SODIUM 40 MG: 100 INJECTION SUBCUTANEOUS at 14:41

## 2021-11-21 RX ADMIN — ATORVASTATIN CALCIUM 10 MG: 10 TABLET, FILM COATED ORAL at 23:31

## 2021-11-21 RX ADMIN — PANTOPRAZOLE SODIUM 40 MG: 40 TABLET, DELAYED RELEASE ORAL at 15:31

## 2021-11-21 RX ADMIN — INSULIN LISPRO 25 UNITS: 100 INJECTION, SUSPENSION SUBCUTANEOUS at 23:33

## 2021-11-21 RX ADMIN — GLIPIZIDE 10 MG: 10 TABLET ORAL at 22:02

## 2021-11-21 RX ADMIN — DOXAZOSIN 8 MG: 4 TABLET ORAL at 22:02

## 2021-11-21 ASSESSMENT — ENCOUNTER SYMPTOMS
SINUS PRESSURE: 0
COUGH: 0
VOMITING: 0
COLOR CHANGE: 0
CONSTIPATION: 0
RHINORRHEA: 0
ABDOMINAL DISTENTION: 0
CHEST TIGHTNESS: 0
NAUSEA: 0
SORE THROAT: 0
WHEEZING: 0
EYE PAIN: 0
BLOOD IN STOOL: 0
DIARRHEA: 0
TROUBLE SWALLOWING: 0
ABDOMINAL PAIN: 0
SHORTNESS OF BREATH: 1

## 2021-11-21 NOTE — ED NOTES
Pt transported to [facility] by cart in stable condition. Called [facility] and informed the charge nurse that the patient was on their way to the unit.       Speedy Nava, MARLA  30/83/37 [id number]

## 2021-11-21 NOTE — FLOWSHEET NOTE
Pt admitted to  [phone] in a wheelchair. Complaints: Shortness of breath. IV none infusing into the antecubital left, condition patent and no redness. IV site free of s/s of infection or infiltration. Vital signs obtained. Assessment and data collection initiated. Two nurse skin assessment performed by [facility] and Salvador Bello. Oriented to room. Policies and procedures for [facility] explained. Agnes Conner RN discussed hourly rounding with patient addressing 5 P's. Fall prevention and safety brochure discussed with patient. Bed alarm on. Call light in reach. All questions answered with no further questions at this time.          Denia Canela RN

## 2021-11-21 NOTE — ED TRIAGE NOTES
Patient to room 22 from triage for complaint of bilateral leg swelling and low oxygen readings. Patient reports that he is currently being treated for pneumonia and he has been noting low oxygen readings on his pulse oximeter at home. Patient does have home oxygen but reports that he is only wearing it at night. Patient states that he has also been noticing swelling bilateral legs over the past two to three days, however today the swelling is worsening and extends up into his thighs. Patient placed on monitor and noted to be 75% on room air, patient placed on 6L via nasal cannula with immediate improvement noted to 95%. Stacey Perez, BC to bedside. EKG completed.

## 2021-11-21 NOTE — H&P
HISTORY & PHYSICAL       Patient:  Jerel Dunn  YOB: 1945    MRN: [id number]     Acct: [de-identified]    PCP: Jael Robison MD    Date of Admission: 11/21/2021    Date of Service: Pt seen/examined on 11/21/2021 and Admitted to Inpatient with expected LOS greater than two midnights due to medical therapy. ASSESSMENT:    Active Hospital Problems    Diagnosis Date Noted    Hypoxia [R09.02] 11/21/2021       PLAN:    Hypoxia due to acute COPD exacerbation with primary respiratory acidosis - compensated  Considering possibility of viral URI, two failed courses of antibiotics  CTA chest: no PE, atelectasis  O2 sats 97% on 3L  ABG: primary respiratory acidosis - compensated  Procalcitonin negative at 0.05  Pro-  Troponin negative, will trend  COVID negative and vaccinated  Flu A/B  Tiotropium   Prednisone 40 daily  Incentive spirometry  Wean O2 as tolerated maintaining saturation above 90%  Monitor vitals    Macrocytic anemia  Consider patient is at baseline hemoglobin   History of macrocytic anemia  No hematemesis, hemoptysis, melena  Obtain anemia work-up  Trend hemoglobin    Lower extremity edema  Low suspicion CHF, consider poor circulation   Prior echo EF: 55%  ProBNP normal: 113.4  Negative troponin  ECHO  Monitor trending troponin    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 male who presented to [address] [place] [address] with shortness of breath. 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   Patient states he saw his PCP with symptoms of shortness of breath and fatigue on 11/10/2021 and tested negative for Covid. Patient states he was administered azithromycin for 5 days without improvement and also received a chest x-ray. Patient return to PCP 11/15/2021 for follow-up without improvement and placed on cefuroxime 250 for 10 days, currently on day 5 of treatment without improvement. Patient shortness of breath is exacerbated by activity, home nightly O2 of 1-2L nasal cannula and rest help only slightly alleviate his symptoms. Patient states that his home O2 saturations have been in the low 80s high 70s. Patient states has had no change in sputum production, typical production is only a couple days a week, when present it is white in color and less than a teaspoon worth of sputum. Patient has a history of leg swelling, states the last 3 to 4 days he is experienced a slight increase in swelling of legs bilaterally. Patient denies leg pain, chest pain or leg erythema, denies history of congestive heart failure. Patient denies lightheadedness, dizziness, increased sputum production, chest pain, abdominal pain, diarrhea, constipation, dysuria. Past Medical History:          Diagnosis Date    Arthritis     Cancer ([facility] [address] 1990    Spelenectomy, cholycystectomy,partial pancrease, partial stomache, lymphnodes    COPD (chronic obstructive pulmonary disease) (HCC)     Hypertension     Nausea & vomiting     Type II or unspecified type diabetes mellitus without mention of complication, not stated as uncontrolled        Past Surgical History:          Procedure Laterality Date    CHOLECYSTECTOMY      COLONOSCOPY  3/16/12    [facility]    CYST REMOVAL      FROM NECK AND BACK    HERNIA REPAIR      OTHER SURGICAL HISTORY  1/25/2013    RIGHT POST.  CERVICAL LAMINOTOMIES AND FORAMINOTOMIES    PANCREAS SURGERY      partial removal     PARATHYROID GLAND SURGERY      PILONIDAL CYST EXCISION      SPLENECTOMY, TOTAL         Medications Prior to Admission:      Prior to Admission medications    Medication Sig Start Date End Date Taking? Authorizing Provider   cefUROXime (CEFTIN) 250 MG tablet Take 1 tablet by mouth 2 times daily for 10 days 11/15/21 11/25/21 Yes Jerson Shea MD   fluticasone-salmeterol (ADVAIR DISKUS) 250-50 MCG/DOSE AEPB Inhale 1 puff into the lungs 2 times daily 3/26/21  Yes SHONNA Kimbrough CNP   atorvastatin (LIPITOR) 10 MG tablet Take 1 tablet by mouth nightly  8/16/18  Yes Historical Provider, MD   doxazosin (CARDURA) 8 MG tablet Take 1 tablet by mouth nightly  8/16/18  Yes Historical Provider, MD   glipiZIDE (GLIPIZIDE XL) 10 MG extended release tablet Take 10 mg by mouth 2 times daily   Yes Historical Provider, MD   TRAVATAN Z 0.004 % SOLN ophthalmic solution Place 1 drop into both eyes nightly. 9/12/12  Yes Historical Provider, MD   quinapril (ACCUPRIL) 20 MG tablet Take 20 mg by mouth 2 times daily. Yes Historical Provider, MD   esomeprazole (NEXIUM) 40 MG capsule Take 40 mg by mouth 2 times daily. Yes Historical Provider, MD   BD INSULIN SYRINGE U/F 31G X 5/16\" 0.5 ML MISC USE AS DIRECTED UNDER THE SKIN TWICE DAILY 8/6/21   Historical Provider, MD   ipratropium-albuterol (DUONEB) 0.5-2.5 (3) MG/3ML SOLN nebulizer solution Inhale 3 mLs into the lungs every 4 hours as needed for Shortness of Breath 1/20/20   Jerson Shea MD   albuterol sulfate (PROAIR RESPICLICK) 501 (90 Base) MCG/ACT aerosol powder inhalation Inhale 2 puffs into the lungs every 6 hours as needed for Wheezing or Shortness of Breath 8/8/17   Reed Calvin PA-C   OXYGEN Inhale into the lungs 2 L qhs  per Allen Murray    Historical Provider, MD   sildenafil (VIAGRA) 100 MG tablet Take 100 mg by mouth as needed for Erectile Dysfunction    Historical Provider, MD   ALPRAZolam Marcellina Bullion) 0.25 MG tablet Take 0.25 mg by mouth nightly as needed for Sleep.     Historical Provider, MD   Insulin Lispro Prot & Lispro (HUMALOG MIX 75/25 SC) Inject 30 Units into General: No scleral icterus. Right eye: No discharge. Left eye: No discharge. Conjunctiva/sclera: Conjunctivae normal.   Cardiovascular:      Rate and Rhythm: Normal rate and regular rhythm. Heart sounds: Normal heart sounds. No murmur heard. Pulmonary:      Breath sounds: Normal breath sounds. Musculoskeletal:      Cervical back: Normal range of motion. Skin:     General: Skin is warm and dry. Findings: No erythema or rash. Neurological:      Mental Status: He is alert and oriented to person, place, and time. Cranial Nerves: No cranial nerve deficit. Psychiatric:         Behavior: Behavior normal.         Thought Content: Thought content normal.         Judgment: Judgment normal.           Labs:     Recent Labs     11/21/21  0951   WBC 10.9*   HGB 10.2*   HCT 34.5*        Recent Labs     11/21/21  0951      K 4.4      CO2 30   BUN 14   CREATININE 0.9   CALCIUM 10.0     Recent Labs     11/21/21  0951   AST 12   ALT 10*   BILITOT 0.5   ALKPHOS 83     No results for input(s): INR in the last 72 hours. No results for input(s): [facility] in the last 72 hours. Urinalysis:      Lab Results   Component Value Date    NITRU NEGATIVE 03/01/2017    WBCUA 2-4 03/01/2017    BACTERIA NONE 03/01/2017    RBCUA 0-2 03/01/2017    BLOODU NEGATIVE 03/01/2017    SPECGRAV 1.029 03/01/2017    GLUCOSEU >= 1000 02/28/2017       Intake & Output:  No intake/output data recorded. No intake/output data recorded. Radiology:       CTA CHEST W WO CONTRAST   Final Result       1. No pulmonary emboli. 2. Linear opacities in both lung bases. This is most likely atelectasis. **This report has been created using voice recognition software. It may contain minor errors which are inherent in voice recognition technology. **      Final report electronically signed by Dr. Karen Merrill on 11/21/2021 11:34 AM               DVT prophylaxis: [x] Lovenox [] SCDs                                 [] SQ Heparin                                 [] Encourage ambulation           [] Already on Anticoagulation    Code Status: Full Code      PT/OT Eval Status:     Disposition:    [x] Home       [] TCU       [] Rehab       [] Psych       [] SNF       [] [place]       [] Other-        Thank you Maggy Adkins MD for the opportunity to be involved in this patient's care.     Electronically signed by Jen Sullivan MD on 11/21/2021 at 2:25 PM

## 2021-11-21 NOTE — ED PROVIDER NOTES
[facility]       Chief Complaint   Patient presents with    Shortness of Breath    Leg Swelling       Nurses Notes reviewed and I agree except as noted in the HPI. HISTORY OF PRESENT ILLNESS    Annmarie Patel mai 67 y.o. male who presents to the ED for evaluation of shortness of breath, leg swelling. Patient notes history of COPD, noticed shortness of breath and cough over the last several weeks, has been on 2 rounds of antibiotics for potential pneumonia. Has developed swelling in his lower extremities. Has a history of DVT in the past, has Brady filter in place. Notes significant orthopnea. Denies fevers or chills. Notes he normally wears 1 to 2 L of oxygen at night, has been wearing this all throughout the day. He checked his oxygen level and noted low levels at home. Patient denies history of congestive heart failure or coronary artery disease. He has a history of neuroendocrine cancer, had spleen partial pancreas removal in the past.  Has a history of hypertension and type 2 diabetes. Most recent echocardiogram was 2017 which showed left ventricular ejection fraction of 55%. HPI was provided by the patient. REVIEW OF SYSTEMS     Review of Systems   Constitutional: Positive for activity change and fatigue. Negative for chills and fever. HENT: Negative for congestion, rhinorrhea, sinus pressure and sore throat. Respiratory: Positive for shortness of breath. Negative for cough, chest tightness and wheezing. Cardiovascular: Positive for leg swelling. Negative for chest pain and palpitations. Gastrointestinal: Negative for abdominal distention, abdominal pain, diarrhea, nausea and vomiting. Genitourinary: Negative for decreased urine volume and difficulty urinating. Musculoskeletal: Negative for arthralgias and myalgias. Skin: Negative for color change and rash. Allergic/Immunologic: Positive for immunocompromised state. Neurological: Negative for dizziness, weakness, light-headedness, numbness and headaches. Hematological: Does not bruise/bleed easily. Psychiatric/Behavioral: Negative for agitation, behavioral problems and confusion. PAST MEDICAL HISTORY     Past Medical History:   Diagnosis Date    Arthritis     Cancer ([facility] [address] 1990    Spelenectomy, cholycystectomy,partial pancrease, partial stomache, lymphnodes    COPD (chronic obstructive pulmonary disease) (HCC)     Hypertension     Nausea & vomiting     Type II or unspecified type diabetes mellitus without mention of complication, not stated as uncontrolled        SURGICALHISTORY      has a past surgical history that includes Colonoscopy (3/16/12); Splenectomy, total; Pancreas surgery; Cholecystectomy; cyst removal; hernia repair; Pilonidal cyst excision; other surgical history (1/25/2013); and Parathyroid gland surgery. CURRENT MEDICATIONS       Current Discharge Medication List      CONTINUE these medications which have NOT CHANGED    Details   cefUROXime (CEFTIN) 250 MG tablet Take 1 tablet by mouth 2 times daily for 10 days  Qty: 20 tablet, Refills: 0      fluticasone-salmeterol (ADVAIR DISKUS) 250-50 MCG/DOSE AEPB Inhale 1 puff into the lungs 2 times daily  Qty: 3 each, Refills: 3      atorvastatin (LIPITOR) 10 MG tablet Take 1 tablet by mouth nightly       doxazosin (CARDURA) 8 MG tablet Take 1 tablet by mouth nightly       glipiZIDE (GLIPIZIDE XL) 10 MG extended release tablet Take 10 mg by mouth 2 times daily      TRAVATAN Z 0.004 % SOLN ophthalmic solution Place 1 drop into both eyes nightly. quinapril (ACCUPRIL) 20 MG tablet Take 20 mg by mouth 2 times daily. esomeprazole (NEXIUM) 40 MG capsule Take 40 mg by mouth 2 times daily.         BD INSULIN SYRINGE U/F 31G X 5/16\" 0.5 ML MISC USE AS DIRECTED UNDER THE SKIN TWICE DAILY      ipratropium-albuterol (DUONEB) 0.5-2.5 (3) MG/3ML SOLN nebulizer solution Inhale 3 mLs into the lungs every 4 hours as needed for Shortness of Breath  Qty: 360 mL, Refills: 0      albuterol sulfate (PROAIR RESPICLICK) 055 (90 Base) MCG/ACT aerosol powder inhalation Inhale 2 puffs into the lungs every 6 hours as needed for Wheezing or Shortness of Breath  Qty: 3 Inhaler, Refills: 3      OXYGEN Inhale into the lungs 2 L qhs  per Lorraine Fajardo      sildenafil (VIAGRA) 100 MG tablet Take 100 mg by mouth as needed for Erectile Dysfunction      ALPRAZolam (XANAX) 0.25 MG tablet Take 0.25 mg by mouth nightly as needed for Sleep. Insulin Lispro Prot & Lispro (HUMALOG MIX 75/25 SC) Inject 30 Units into the skin 2 times daily              ALLERGIES     is allergic to propofol and bee venom. FAMILY HISTORY     He indicated that his father is . He indicated that the status of his son is unknown.   family history includes Cancer in his father and son. SOCIAL HISTORY       Social History     Socioeconomic History    Marital status:      Spouse name: Not on file    Number of children: Not on file    Years of education: Not on file    Highest education level: Not on file   Occupational History    Not on file   Tobacco Use    Smoking status: Former Smoker     Packs/day: 2.00     Years: 20.00     Pack years: 40.00     Types: Cigarettes     Quit date: 1991     Years since quittin.5    Smokeless tobacco: Never Used   Substance and Sexual Activity    Alcohol use: Yes     Alcohol/week: 0.0 standard drinks     Comment: rarely    Drug use: No    Sexual activity: Not on file   Other Topics Concern    Not on file   Social History Narrative    Not on file     Social Determinants of Health     Financial Resource Strain: Low Risk     Difficulty of Paying Living Expenses: Not hard at all   Food Insecurity: No Food Insecurity    Worried About [address] [employer] in the Last Year: Never true    [facility] in the Last Year: Never true   Transportation Needs:     Lack of Transportation (Medical):  Not on software. It may contain minor errors which are inherent in voice recognition technology. **      Final report electronically signed by Dr. Ness Boudreaux on 11/21/2021 11:34 AM            LABS:   Labs Reviewed   CBC WITH AUTO DIFFERENTIAL - Abnormal; Notable for the following components:       Result Value    WBC 10.9 (*)     RBC 3.42 (*)     Hemoglobin 10.2 (*)     Hematocrit 34.5 (*)     .9 (*)     MCHC 29.6 (*)     RDW-SD 47.5 (*)     Monocytes Absolute 1.4 (*)     Eosinophils Absolute 0.9 (*)     All other components within normal limits   COMPREHENSIVE METABOLIC PANEL - Abnormal; Notable for the following components:    ALT 10 (*)     All other components within normal limits   GLOMERULAR FILTRATION RATE, ESTIMATED - Abnormal; Notable for the following components:    Est, Glom Filt Rate 82 (*)     All other components within normal limits   BLOOD GAS, ARTERIAL - Abnormal; Notable for the following components:    pH, Blood Gas 7.33 (*)     PCO2 65 (*)     HCO3 35 (*)     Base Excess (Calculated) 6.9 (*)     All other components within normal limits   COVID-19, RAPID   RAPID INFLUENZA A/B ANTIGENS   BRAIN NATRIURETIC PEPTIDE   TROPONIN   TSH WITHOUT REFLEX   PROCALCITONIN   ANION GAP   OSMOLALITY   TROPONIN   IRON SATURATION   IRON   IRON BINDING CAPACITY   VITAMIN B12 & FOLATE   RETICULOCYTES   TROPONIN   BASIC METABOLIC PANEL   CBC WITH AUTO DIFFERENTIAL   POCT GLUCOSE   POCT GLUCOSE       EMERGENCY DEPARTMENT COURSE:   Vitals:    Vitals:    11/21/21 1236 11/21/21 1306 11/21/21 1330 11/21/21 1504   BP:  (!) 159/59 (!) 141/51 (!) 119/51   Pulse:  83 77 71   Resp: 15 17 18 18   Temp:   98.6 °F (37 °C) 98.2 °F (36.8 °C)   TempSrc:   Oral Oral   SpO2: 97% 96% 96% 97%   Weight:       Height:           MDM    Patient was seen and evaluated in the emergency department, patient appeared to be in no acute distress, vital signs reviewed, hypoxia noted.   Physical exam was completed, Rales and decreased lung sounds in the bases noted. 2+ pitting edema in bilateral lower extremities noted. Labs and imaging were ordered, no significant findings noted. Brain atretic peptide normal Covid negative, no leukocytosis noted, procalcitonin negative. CT of the chest reveals no pulmonary emboli, linear opacities in both lung bases noted. This could simply be a COPD exacerbation although the patient had rhonchi on examination. He requires admission due to hypoxia. Discussed the case with hospitalist service who accepts patient for admission. Discussed plan of care with the patient he is agreeable with this plan of care. While here in the emergency department patient maintained stable course and appropriate for discharge.   Medications   atorvastatin (LIPITOR) tablet 10 mg (has no administration in time range)   doxazosin (CARDURA) tablet 8 mg (has no administration in time range)   pantoprazole (PROTONIX) tablet 40 mg (40 mg Oral Given 11/21/21 1531)   budesonide-formoterol (SYMBICORT) 80-4.5 MCG/ACT inhaler 2 puff (has no administration in time range)   glipiZIDE (GLUCOTROL) tablet 10 mg (has no administration in time range)   insulin lispro protamine & lispro (HUMALOG MIX) (75-25) 100 UNIT per ML injection vial SUSP 30 Units (has no administration in time range)   ipratropium-albuterol (DUONEB) nebulizer solution 3 mL (has no administration in time range)   lisinopril (PRINIVIL;ZESTRIL) tablet 20 mg (has no administration in time range)   sodium chloride flush 0.9 % injection 5-40 mL (has no administration in time range)   sodium chloride flush 0.9 % injection 5-40 mL (has no administration in time range)   0.9 % sodium chloride infusion (has no administration in time range)   enoxaparin (LOVENOX) injection 40 mg (40 mg SubCUTAneous Given 11/21/21 1441)   ondansetron (ZOFRAN-ODT) disintegrating tablet 4 mg (has no administration in time range)     Or   ondansetron (ZOFRAN) injection 4 mg (has no administration in time range) polyethylene glycol (GLYCOLAX) packet 17 g (has no administration in time range)   acetaminophen (TYLENOL) tablet 650 mg (has no administration in time range)     Or   acetaminophen (TYLENOL) suppository 650 mg (has no administration in time range)   glucose (GLUTOSE) 40 % oral gel 15 g (has no administration in time range)   dextrose 50 % IV solution (has no administration in time range)   glucagon (rDNA) injection 1 mg (has no administration in time range)   dextrose 5 % solution (has no administration in time range)   insulin lispro (HUMALOG) injection vial 0-6 Units (0 Units SubCUTAneous Not Given 11/21/21 1604)   tiotropium (SPIRIVA RESPIMAT) 2.5 MCG/ACT inhaler 2 puff (2 puffs Inhalation Given 11/21/21 1543)   predniSONE (DELTASONE) tablet 40 mg (40 mg Oral Given 11/21/21 1531)   iopamidol (ISOVUE-370) 76 % injection 80 mL (80 mLs IntraVENous Given 11/21/21 1053)       Patient was seenindependently by myself. The patient's final impression and disposition and plan was determined by myself. CRITICAL CARE:   None    CONSULTS:  Patient discharged in stable condition    PROCEDURES:  None    FINAL IMPRESSION     1. Hypoxia    2. Dyspnea and respiratory abnormalities    3. Leg edema          DISPOSITION/PLAN   Patient discharged in stable condition  PATIENT REFERREDTO:  No follow-up provider specified. DISCHARGE MEDICATIONS:  Current Discharge Medication List          (Please note that portions of this note were completed with a voice recognition program.  Efforts were made to edit the dictations but occasionally words are mis-transcribed.)      Provider:  I personally performed the services described in the documentation,reviewed and edited the documentation which was dictated to the scribe in my presence, and it accurately records my words and actions.     Wil Pitts, BC 11/21/21 6:07 PM    Mylene Pitts, APRN - CNP        [place], APRN - CNP  11/21/21 4531

## 2021-11-22 ENCOUNTER — TELEPHONE (OUTPATIENT)
Dept: PULMONOLOGY | Age: 76
End: 2021-11-22

## 2021-11-22 LAB
ANION GAP SERPL CALCULATED.3IONS-SCNC: 6 MEQ/L (ref 8–16)
BASOPHILS # BLD: 0.4 %
BASOPHILS ABSOLUTE: 0 THOU/MM3 (ref 0–0.1)
BUN BLDV-MCNC: 13 MG/DL (ref 7–22)
CALCIUM SERPL-MCNC: 10.1 MG/DL (ref 8.5–10.5)
CHLORIDE BLD-SCNC: 101 MEQ/L (ref 98–111)
CO2: 32 MEQ/L (ref 23–33)
CREAT SERPL-MCNC: 0.9 MG/DL (ref 0.4–1.2)
EOSINOPHIL # BLD: 0 %
EOSINOPHILS ABSOLUTE: 0 THOU/MM3 (ref 0–0.4)
ERYTHROCYTE [DISTWIDTH] IN BLOOD BY AUTOMATED COUNT: 12.8 % (ref 11.5–14.5)
ERYTHROCYTE [DISTWIDTH] IN BLOOD BY AUTOMATED COUNT: 47.3 FL (ref 35–45)
FLU A ANTIGEN: NEGATIVE
FLU B ANTIGEN: NEGATIVE
GFR SERPL CREATININE-BSD FRML MDRD: 82 ML/MIN/1.73M2
GLUCOSE BLD-MCNC: 133 MG/DL (ref 70–108)
GLUCOSE BLD-MCNC: 138 MG/DL (ref 70–108)
GLUCOSE BLD-MCNC: 329 MG/DL (ref 70–108)
GLUCOSE BLD-MCNC: 38 MG/DL (ref 70–108)
GLUCOSE BLD-MCNC: 83 MG/DL (ref 70–108)
HCT VFR BLD CALC: 34.5 % (ref 42–52)
HEMOGLOBIN: 10 GM/DL (ref 14–18)
IMMATURE GRANS (ABS): 0.03 THOU/MM3 (ref 0–0.07)
IMMATURE GRANULOCYTES: 0.4 %
LV EF: 65 %
LVEF MODALITY: NORMAL
LYMPHOCYTES # BLD: 16.9 %
LYMPHOCYTES ABSOLUTE: 1.3 THOU/MM3 (ref 1–4.8)
MCH RBC QN AUTO: 29.4 PG (ref 26–33)
MCHC RBC AUTO-ENTMCNC: 29 GM/DL (ref 32.2–35.5)
MCV RBC AUTO: 101.5 FL (ref 80–94)
MONOCYTES # BLD: 10.8 %
MONOCYTES ABSOLUTE: 0.8 THOU/MM3 (ref 0.4–1.3)
NUCLEATED RED BLOOD CELLS: 0 /100 WBC
PLATELET # BLD: 280 THOU/MM3 (ref 130–400)
PMV BLD AUTO: 10.3 FL (ref 9.4–12.4)
POTASSIUM SERPL-SCNC: 4.6 MEQ/L (ref 3.5–5.2)
RBC # BLD: 3.4 MILL/MM3 (ref 4.7–6.1)
SEG NEUTROPHILS: 71.5 %
SEGMENTED NEUTROPHILS ABSOLUTE COUNT: 5.6 THOU/MM3 (ref 1.8–7.7)
SODIUM BLD-SCNC: 139 MEQ/L (ref 135–145)
WBC # BLD: 7.8 THOU/MM3 (ref 4.8–10.8)

## 2021-11-22 PROCEDURE — 6360000002 HC RX W HCPCS: Performed by: STUDENT IN AN ORGANIZED HEALTH CARE EDUCATION/TRAINING PROGRAM

## 2021-11-22 PROCEDURE — 2700000000 HC OXYGEN THERAPY PER DAY

## 2021-11-22 PROCEDURE — 82948 REAGENT STRIP/BLOOD GLUCOSE: CPT

## 2021-11-22 PROCEDURE — 94640 AIRWAY INHALATION TREATMENT: CPT

## 2021-11-22 PROCEDURE — 36415 COLL VENOUS BLD VENIPUNCTURE: CPT

## 2021-11-22 PROCEDURE — 80048 BASIC METABOLIC PNL TOTAL CA: CPT

## 2021-11-22 PROCEDURE — 6370000000 HC RX 637 (ALT 250 FOR IP): Performed by: STUDENT IN AN ORGANIZED HEALTH CARE EDUCATION/TRAINING PROGRAM

## 2021-11-22 PROCEDURE — 85025 COMPLETE CBC W/AUTO DIFF WBC: CPT

## 2021-11-22 PROCEDURE — 93306 TTE W/DOPPLER COMPLETE: CPT

## 2021-11-22 PROCEDURE — 87804 INFLUENZA ASSAY W/OPTIC: CPT

## 2021-11-22 PROCEDURE — 1200000003 HC TELEMETRY R&B

## 2021-11-22 PROCEDURE — 2580000003 HC RX 258: Performed by: STUDENT IN AN ORGANIZED HEALTH CARE EDUCATION/TRAINING PROGRAM

## 2021-11-22 PROCEDURE — 99233 SBSQ HOSP IP/OBS HIGH 50: CPT | Performed by: HOSPITALIST

## 2021-11-22 PROCEDURE — 94760 N-INVAS EAR/PLS OXIMETRY 1: CPT

## 2021-11-22 RX ADMIN — INSULIN LISPRO 30 UNITS: 100 INJECTION, SUSPENSION SUBCUTANEOUS at 22:17

## 2021-11-22 RX ADMIN — BUDESONIDE AND FORMOTEROL FUMARATE DIHYDRATE 2 PUFF: 80; 4.5 AEROSOL RESPIRATORY (INHALATION) at 09:29

## 2021-11-22 RX ADMIN — PANTOPRAZOLE SODIUM 40 MG: 40 TABLET, DELAYED RELEASE ORAL at 05:03

## 2021-11-22 RX ADMIN — PREDNISONE 40 MG: 20 TABLET ORAL at 07:59

## 2021-11-22 RX ADMIN — TIOTROPIUM BROMIDE INHALATION SPRAY 2 PUFF: 3.12 SPRAY, METERED RESPIRATORY (INHALATION) at 09:29

## 2021-11-22 RX ADMIN — SODIUM CHLORIDE, PRESERVATIVE FREE 10 ML: 5 INJECTION INTRAVENOUS at 22:19

## 2021-11-22 RX ADMIN — PANTOPRAZOLE SODIUM 40 MG: 40 TABLET, DELAYED RELEASE ORAL at 16:07

## 2021-11-22 RX ADMIN — SODIUM CHLORIDE, PRESERVATIVE FREE 10 ML: 5 INJECTION INTRAVENOUS at 07:59

## 2021-11-22 RX ADMIN — ATORVASTATIN CALCIUM 10 MG: 10 TABLET, FILM COATED ORAL at 22:17

## 2021-11-22 RX ADMIN — ENOXAPARIN SODIUM 40 MG: 100 INJECTION SUBCUTANEOUS at 14:25

## 2021-11-22 RX ADMIN — DOXAZOSIN 8 MG: 4 TABLET ORAL at 07:59

## 2021-11-22 RX ADMIN — BUDESONIDE AND FORMOTEROL FUMARATE DIHYDRATE 2 PUFF: 80; 4.5 AEROSOL RESPIRATORY (INHALATION) at 21:15

## 2021-11-22 RX ADMIN — LISINOPRIL 20 MG: 20 TABLET ORAL at 07:59

## 2021-11-22 NOTE — TELEPHONE ENCOUNTER
Patient wife called stating she had to canceled appt today since patient is in the Er since yesterday. They will call back if they need to r/s appt.

## 2021-11-22 NOTE — PROGRESS NOTES
Physician Progress Note      PATIENT:               Danya Antony  CSN #:                  [id number]  :                       1945  ADMIT DATE:       2021 9:19 AM  DISCH DATE:  RESPONDING  PROVIDER #:        Linnette Koo MD          QUERY TEXT:    Pt admitted with COPD exacerbation. Pt noted to have hypoxia with pulse ox   75% on RA. If possible, please document in the progress notes and discharge   summary if you are evaluating and/or treating any of the following: The medical record reflects the following:    Risk Factors: COPD exacerbation  Clinical Indicators: presents with SOB, pulse ox 75% on RA, ABG pH 7.33, CO2   65, PO2 90, HCO3 35 96% on 4L NC  Treatment: continuous O2 per NC 3-6 L, Duoneb, Spiriva, Prednisone    Thank you! Brianna Garcia, CRCR  RN Clinical   P: [phone]  Options provided:  -- Acute respiratory failure with hypoxia  -- Acute respiratory failure with hypoxia and hypercapnia  -- Acute hypoxia without respiratory failure  -- Other - I will add my own diagnosis  -- Disagree - Not applicable / Not valid  -- Disagree - Clinically unable to determine / Unknown  -- Refer to Clinical Documentation Reviewer    PROVIDER RESPONSE TEXT:    This patient has acute respiratory failure with hypoxia and hypercapnia.     Query created by: Mary Found on 2021 12:12 PM      Electronically signed by:  Linnette Koo MD 2021 6:09 PM

## 2021-11-22 NOTE — CARE COORDINATION
11/22/21, 9:01 AM EST  DISCHARGE PLANNING EVALUATION:    Jah Joe       Admitted: 11/21/2021/ [address] day: 1   Location: [facility]12/012A Reason for admit: Leg edema [R60.0]  Dyspnea and respiratory abnormalities [R06.00, R06.89]  Hypoxia [R09.02]   PMH:  has a past medical history of Arthritis, Cancer ([facility] [address] COPD (chronic obstructive pulmonary disease) ([facility] [address] Hypertension, Nausea & vomiting, and Type II or unspecified type diabetes mellitus without mention of complication, not stated as uncontrolled. Procedure: none  Barriers to Discharge:  97% on 3L. Echo pending. Glucotrol held due to BS 38 this a.m. PCP: Roegrio Mortensen MD  Readmission Risk Score: 12.4 ( )%    Patient Goals/Plan/Treatment Preferences: Met with Phillis Severs and his wife, they plan to return home independently at discharge. He has nighttime oxygen provided by  DME, but is requiring use in the daytime currently. He denies need for additional DME, HH, or OP services. Transportation/Food Security/Housekeeping Addressed:  No issues identified.

## 2021-11-22 NOTE — PROGRESS NOTES
PROGRESS NOTE      Patient:  Jennifer Ojeda      Unit/Bed:6K-12/012-A    YOB: 1945    MRN: [id number]       Acct: [de-identified]     PCP: Sukh Chahal MD    Date of Admission: 11/21/2021      Assessment/Plan:    Anticipated Discharge in : 1-2 days    Active Hospital Problems    Diagnosis Date Noted    Hypoxia [R09.02] 11/21/2021       Hypoxia due to acute COPD exacerbation with primary respiratory acidosis - compensated  - CTA chest: no PE, atelectasis  - ABG: primary respiratory acidosis - compensated  - Procalcitonin negative at 0.05 / Pro- / Troponin negative  - COVID negative and vaccinated / Flu A/B negative  - Continue Symbicort, Spiriva, Prednisone 40mg PO daily  - Continue Incentive spirometry  - Wean O2 as tolerated to maintain SpO2 >  90%  - If no improvement, consider high resolution CT to assess for parenchymal lung disease    Chronic macrocytic anemia, stable. - Macrocytosis likely secondary to Metformin use  - Anemia profile WNL  - No signs/symptoms of active bleeding  - Monitor for signs/symptoms of bleeding  - CBC daily  - Consider outpatient workup for GI source      Bilateral upper and lower extremity edema  - Low suspicion CHF given negative BNP, normal EF / consider poor circulation as etiology  - Prior echo in 2017 with EF: 55% / repeat Echo today with EF 65%  - Elevate extremities.     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 Hypoglycemia protocol in place. Carb control diet. - Continue Humalog Mix 30 units BID. SSI only if needed TID after meals and QHS PRN.  - Discontinue Glipizide due to hypoglycemia.  Recommend resuming Metformin on discharge as it does not contribute to hypoglycemia    GERD  - History of Jose Smoke syndrome/MEN 1  - Continue Protonix.      Primary Hypertension  - Blood pressures controlled  - Continue Cardura and Lisinopril with hold parameters    Obesity  BMI 28.10    Chief Complaint: shortness of breath    Hospital Course: As per H&P \"70 y.o. male who presented to [facility] with shortness of breath. Patient has a history of COPD, hypertension, Zollinger-Castellanos later diagnosed as MEN-1 with pancreatectomy, diabetes status post pancreatectomy. Patient states he saw his PCP with symptoms of shortness of breath and fatigue on 11/10/2021 and tested negative for Covid. Patient states he was administered azithromycin for 5 days without improvement and also received a chest x-ray. Patient return to PCP 11/15/2021 for follow-up without improvement and placed on cefuroxime 250 for 10 days, currently on day 5 of treatment without improvement. Patient shortness of breath is exacerbated by activity, home nightly O2 of 1-2L nasal cannula and rest help only slightly alleviate his symptoms. Patient states that his home O2 saturations have been in the low 80s high 70s. Patient states has had no change in sputum production, typical production is only a couple days a week, when present it is white in color and less than a teaspoon worth of sputum.       Patient has a history of leg swelling, states the last 3 to 4 days he is experienced a slight increase in swelling of legs bilaterally. Patient denies leg pain, chest pain or leg erythema, denies history of congestive heart failure.     Patient denies lightheadedness, dizziness, increased sputum production, chest pain, abdominal pain, diarrhea, constipation, dysuria. \"    Subjective:  Patient seen and examined at the bedside. This am had hypoglycemia with blood glucose per POCT of 38 (was 83 on BMP). He denied dizziness, lightheadedness, diaphoresis. States he refused his nighttime dose of SSI. Wife at bedside reports he has been having more frequent episodes of hypoglycemia at home. He also reports more frequent BUE and BLE edema.  Denies chest pain, shortness of breath, abdominal pain, nausea/vomiting, diarrhea or constipation. Medications:  Reviewed    Infusion Medications    sodium chloride      dextrose       Scheduled Medications    atorvastatin  10 mg Oral Nightly    doxazosin  8 mg Oral Daily    pantoprazole  40 mg Oral BID AC    budesonide-formoterol  2 puff Inhalation BID    [Held by provider] glipiZIDE  10 mg Oral BID    insulin lispro protamine & lispro  30 Units SubCUTAneous BID    lisinopril  20 mg Oral Daily    sodium chloride flush  5-40 mL IntraVENous 2 times per day    enoxaparin  40 mg SubCUTAneous Daily    insulin lispro  0-6 Units SubCUTAneous 4x Daily PC & HS    tiotropium  2 puff Inhalation Daily    predniSONE  40 mg Oral Daily     PRN Meds: ipratropium-albuterol, sodium chloride flush, sodium chloride, ondansetron **OR** ondansetron, polyethylene glycol, acetaminophen **OR** acetaminophen, glucose, dextrose, glucagon (rDNA), dextrose      Intake/Output Summary (Last 24 hours) at 11/22/2021 1942  Last data filed at 11/22/2021 1540  Gross per 24 hour   Intake 735 ml   Output --   Net 735 ml       Diet:  ADULT DIET; Regular; 4 carb choices (60 gm/meal)    Exam:  /64   Pulse 70   Temp 99.3 °F (37.4 °C) (Oral)   Resp 16   Ht 6' 5\" (1.956 m)   Wt 239 lb (108.4 kg)   SpO2 95%   BMI 28.34 kg/m²     Physical Exam  Vitals reviewed. Constitutional:       General: He is not in acute distress. Appearance: Normal appearance. He is not ill-appearing. Interventions: Nasal cannula in place. HENT:      Head: Normocephalic and atraumatic. Right Ear: External ear normal.      Left Ear: External ear normal.      Nose: Nose normal.      Mouth/Throat:      Mouth: Mucous membranes are moist.   Eyes:      General:         Right eye: No discharge. Left eye: No discharge. Conjunctiva/sclera: Conjunctivae normal.   Cardiovascular:      Rate and Rhythm: Normal rate and regular rhythm. Pulses: Normal pulses.       Heart sounds: Normal heart sounds. No murmur heard. Pulmonary:      Effort: Pulmonary effort is normal. No respiratory distress. Breath sounds: Normal breath sounds. No wheezing, rhonchi or rales. Abdominal:      General: Abdomen is flat. Bowel sounds are normal. There is no distension. Palpations: Abdomen is soft. Tenderness: There is no abdominal tenderness. There is no guarding or rebound. Musculoskeletal:         General: No tenderness. Normal range of motion. Cervical back: Normal range of motion and neck supple. Right lower leg: Edema present. Left lower leg: Edema present. Comments: BUE non-pitting edema and BLE 1+ pitting edema   Skin:     General: Skin is warm and dry. Capillary Refill: Capillary refill takes less than 2 seconds. Neurological:      General: No focal deficit present. Mental Status: He is alert and oriented to person, place, and time. Psychiatric:         Mood and Affect: Mood normal.         Behavior: Behavior normal.         Thought Content: Thought content normal.         Judgment: Judgment normal.         Labs:   Recent Labs     11/21/21  0951 11/22/21  0440   WBC 10.9* 7.8   HGB 10.2* 10.0*   HCT 34.5* 34.5*    280     Recent Labs     11/21/21  0951 11/22/21  0440    139   K 4.4 4.6    101   CO2 30 32   BUN 14 13   CREATININE 0.9 0.9   CALCIUM 10.0 10.1     Recent Labs     11/21/21  0951   AST 12   ALT 10*   BILITOT 0.5   ALKPHOS 83     No results for input(s): INR in the last 72 hours. No results for input(s): Lynda Gazella in the last 72 hours. Urinalysis:      Lab Results   Component Value Date    NITRU NEGATIVE 03/01/2017    WBCUA 2-4 03/01/2017    BACTERIA NONE 03/01/2017    RBCUA 0-2 03/01/2017    BLOODU NEGATIVE 03/01/2017    SPECGRAV 1.029 03/01/2017    GLUCOSEU >= 1000 02/28/2017       Radiology:  CTA CHEST W WO CONTRAST   Final Result       1. No pulmonary emboli. 2. Linear opacities in both lung bases.  This is most likely atelectasis. **This report has been created using voice recognition software. It may contain minor errors which are inherent in voice recognition technology. **      Final report electronically signed by Dr. Reina Ford on 11/21/2021 11:34 AM          Diet: ADULT DIET;  Regular; 4 carb choices (60 gm/meal)    DVT prophylaxis: [x] Lovenox                                 [] SCDs                                 [] SQ Heparin                                 [] Encourage ambulation           [] Already on Anticoagulation     Disposition:    [x] Home       [] TCU       [] Rehab       [] Psych       [] SNF       [] [place]       [] Other-    Code Status: Full Code    PT/OT Eval Status: not indicated      Electronically signed by Jocelynn Tomas MD on 11/22/2021 at 7:42 PM

## 2021-11-23 VITALS
RESPIRATION RATE: 18 BRPM | OXYGEN SATURATION: 92 % | SYSTOLIC BLOOD PRESSURE: 153 MMHG | TEMPERATURE: 98.8 F | HEIGHT: 77 IN | WEIGHT: 239 LBS | HEART RATE: 61 BPM | BODY MASS INDEX: 28.22 KG/M2 | DIASTOLIC BLOOD PRESSURE: 65 MMHG

## 2021-11-23 LAB
ANION GAP SERPL CALCULATED.3IONS-SCNC: 9 MEQ/L (ref 8–16)
BASOPHILS # BLD: 0.5 %
BASOPHILS ABSOLUTE: 0 THOU/MM3 (ref 0–0.1)
BUN BLDV-MCNC: 17 MG/DL (ref 7–22)
CALCIUM SERPL-MCNC: 10.5 MG/DL (ref 8.5–10.5)
CHLORIDE BLD-SCNC: 98 MEQ/L (ref 98–111)
CO2: 30 MEQ/L (ref 23–33)
CREAT SERPL-MCNC: 0.9 MG/DL (ref 0.4–1.2)
EOSINOPHIL # BLD: 0.1 %
EOSINOPHILS ABSOLUTE: 0 THOU/MM3 (ref 0–0.4)
ERYTHROCYTE [DISTWIDTH] IN BLOOD BY AUTOMATED COUNT: 12.4 % (ref 11.5–14.5)
ERYTHROCYTE [DISTWIDTH] IN BLOOD BY AUTOMATED COUNT: 46.5 FL (ref 35–45)
GFR SERPL CREATININE-BSD FRML MDRD: 82 ML/MIN/1.73M2
GLUCOSE BLD-MCNC: 187 MG/DL (ref 70–108)
GLUCOSE BLD-MCNC: 211 MG/DL (ref 70–108)
GLUCOSE BLD-MCNC: 258 MG/DL (ref 70–108)
GLUCOSE BLD-MCNC: 331 MG/DL (ref 70–108)
HCT VFR BLD CALC: 35.8 % (ref 42–52)
HEMOGLOBIN: 10.5 GM/DL (ref 14–18)
IMMATURE GRANS (ABS): 0.04 THOU/MM3 (ref 0–0.07)
IMMATURE GRANULOCYTES: 0.4 %
LYMPHOCYTES # BLD: 23.1 %
LYMPHOCYTES ABSOLUTE: 2.1 THOU/MM3 (ref 1–4.8)
MCH RBC QN AUTO: 29.7 PG (ref 26–33)
MCHC RBC AUTO-ENTMCNC: 29.3 GM/DL (ref 32.2–35.5)
MCV RBC AUTO: 101.1 FL (ref 80–94)
MONOCYTES # BLD: 16.4 %
MONOCYTES ABSOLUTE: 1.5 THOU/MM3 (ref 0.4–1.3)
NUCLEATED RED BLOOD CELLS: 0 /100 WBC
PLATELET # BLD: 271 THOU/MM3 (ref 130–400)
PMV BLD AUTO: 10.6 FL (ref 9.4–12.4)
POTASSIUM SERPL-SCNC: 5.2 MEQ/L (ref 3.5–5.2)
RBC # BLD: 3.54 MILL/MM3 (ref 4.7–6.1)
SEG NEUTROPHILS: 59.5 %
SEGMENTED NEUTROPHILS ABSOLUTE COUNT: 5.4 THOU/MM3 (ref 1.8–7.7)
SODIUM BLD-SCNC: 137 MEQ/L (ref 135–145)
WBC # BLD: 9.1 THOU/MM3 (ref 4.8–10.8)

## 2021-11-23 PROCEDURE — 94640 AIRWAY INHALATION TREATMENT: CPT

## 2021-11-23 PROCEDURE — 6360000002 HC RX W HCPCS: Performed by: STUDENT IN AN ORGANIZED HEALTH CARE EDUCATION/TRAINING PROGRAM

## 2021-11-23 PROCEDURE — 85025 COMPLETE CBC W/AUTO DIFF WBC: CPT

## 2021-11-23 PROCEDURE — 99239 HOSP IP/OBS DSCHRG MGMT >30: CPT | Performed by: HOSPITALIST

## 2021-11-23 PROCEDURE — 82948 REAGENT STRIP/BLOOD GLUCOSE: CPT

## 2021-11-23 PROCEDURE — 2580000003 HC RX 258: Performed by: STUDENT IN AN ORGANIZED HEALTH CARE EDUCATION/TRAINING PROGRAM

## 2021-11-23 PROCEDURE — 80048 BASIC METABOLIC PNL TOTAL CA: CPT

## 2021-11-23 PROCEDURE — 6370000000 HC RX 637 (ALT 250 FOR IP): Performed by: STUDENT IN AN ORGANIZED HEALTH CARE EDUCATION/TRAINING PROGRAM

## 2021-11-23 PROCEDURE — 36415 COLL VENOUS BLD VENIPUNCTURE: CPT

## 2021-11-23 RX ORDER — PREDNISONE 20 MG/1
40 TABLET ORAL DAILY
Qty: 4 TABLET | Refills: 0 | Status: SHIPPED | OUTPATIENT
Start: 2021-11-24 | End: 2021-11-26

## 2021-11-23 RX ADMIN — TIOTROPIUM BROMIDE INHALATION SPRAY 2 PUFF: 3.12 SPRAY, METERED RESPIRATORY (INHALATION) at 07:41

## 2021-11-23 RX ADMIN — PREDNISONE 40 MG: 20 TABLET ORAL at 09:58

## 2021-11-23 RX ADMIN — INSULIN LISPRO 3 UNITS: 100 INJECTION, SOLUTION INTRAVENOUS; SUBCUTANEOUS at 11:53

## 2021-11-23 RX ADMIN — DOXAZOSIN 8 MG: 4 TABLET ORAL at 09:58

## 2021-11-23 RX ADMIN — SODIUM CHLORIDE, PRESERVATIVE FREE 10 ML: 5 INJECTION INTRAVENOUS at 07:35

## 2021-11-23 RX ADMIN — PANTOPRAZOLE SODIUM 40 MG: 40 TABLET, DELAYED RELEASE ORAL at 06:18

## 2021-11-23 RX ADMIN — INSULIN LISPRO 4 UNITS: 100 INJECTION, SOLUTION INTRAVENOUS; SUBCUTANEOUS at 17:12

## 2021-11-23 RX ADMIN — LISINOPRIL 20 MG: 20 TABLET ORAL at 09:58

## 2021-11-23 RX ADMIN — PANTOPRAZOLE SODIUM 40 MG: 40 TABLET, DELAYED RELEASE ORAL at 15:26

## 2021-11-23 RX ADMIN — BUDESONIDE AND FORMOTEROL FUMARATE DIHYDRATE 2 PUFF: 80; 4.5 AEROSOL RESPIRATORY (INHALATION) at 07:41

## 2021-11-23 RX ADMIN — ENOXAPARIN SODIUM 40 MG: 100 INJECTION SUBCUTANEOUS at 15:26

## 2021-11-23 RX ADMIN — INSULIN LISPRO 1 UNITS: 100 INJECTION, SOLUTION INTRAVENOUS; SUBCUTANEOUS at 07:36

## 2021-11-23 NOTE — PROGRESS NOTES
A home oxygen evaluation has been completed. [x]Patient is an inpatient. It is expected that the patient will be discharged within the next 48 hours. Qualified provider to write order for home prescription if patient qualifies. Social service/care managers will arrange for home oxygen. If patient is active, arrange for Home Medical supplier to assess for Oxygen Conserving Device per pulse oximetry. []Patient is an outpatient. Results will be faxed to the ordering provider. Qualified provider to write order for home prescription if patient qualifies and arranges for home oxygen. Patient was placed on room air for 60 minutes. SpO2 was 82 % on room air at rest. Patients SpO2 was below 89% and qualified for home oxygen. Oxygen was applied at 1 lpm via nasal cannula to maintain a SpO2 between 90-92% while at rest. Actual SpO2 was 90 %. Patient can ambulate for exercise flow rate. Patients was ambulated, SpO2 was 90% on 4 lpm to maintain SpO2 between 90-92% while exercising. .     Note: For any SpO2 at 86% see policy and procedure for possible qualifications.

## 2021-11-23 NOTE — CARE COORDINATION
11/23/21, 3:43 PM EST    Plan discharge tonight to home with wife. Oxygen eval completed, [facility] DME notified of new oxygen requirement (was only hs oxygen PTA), they will deliver a portable tank to room. Patient goals/plan/ treatment preferences discussed by  and . Patient goals/plan/ treatment preferences reviewed with patient/ family. Patient/ family verbalize understanding of discharge plan and are in agreement with goal/plan/treatment preferences. Understanding was demonstrated using the teach back method. AVS provided by RN at time of discharge, which includes all necessary medical information pertaining to the patients current course of illness, treatment, post-discharge goals of care, and treatment preferences.         IMM Letter  IMM Letter given to Patient/Family/Significant other/Guardian/POA/by[de-identified] staff  IMM Letter date given[de-identified] 11/21/21  IMM Letter time given[de-identified] 4113

## 2021-11-23 NOTE — DISCHARGE SUMMARY
DISCHARGE SUMMARY      Patient Identification:   Luis M Campoverde   : 1945  MRN: [id number]   Account: [de-identified]      Patient's PCP: Misa Renteria MD    Admit Date: 2021     Discharge Date: 2021    Admitting Physician: Shayy Cruz MD     Discharge Physician: Mimi Madrigal MD     Discharge Diagnoses:    Acute on chronic hypoxic respiratory failure  Chronic macrocytic anemia  Lymphedema  Insulin-dependent diabetes mellitus  GERD  Primary hypertension  Obesity    The patient was seen and examined on day of discharge and this discharge summary is in conjunction with any daily progress note from day of discharge. Hospital Course: Luis M Campoverde is a 67 y.o. male admitted to [facility] on 2021 for shortness of breath and hypoxia due to acute COPD exacerbation. Workup negative for pulmonary embolism, CHF exacerbation, infectious etiology like pneumonia, COVID or influenza. Symptoms improved with administration of Prednisone, LABA/ICS, LAMA. Home oxygen evaluation performed today and required 1L NC for rest and 4L NC for activity. DME order provided for home oxygen. Patient noted to have episode of hypoglycemia in the hospital and reported increasing frequency of hypoglycemic episodes at home and recent ED visit for hypoglycemia on 21. Recommend discontinuation of Glipizide due to risk of hypoglycemia with use of sulfonylureas and resuming Metformin as it does not carry risk of hypoglycemia while maintaining his previous insulin regimen. Recommend outpatient follow up with pulmonology and obtain full PFTs once fully recovered from COPD exacerbation.      Exam:     Vitals:  Vitals:    21 1136 21 1145 21 1508 21 1510   BP: 137/64  (!) 153/65    Pulse: 71  68 61   Resp: 18      Temp: 97.6 °F (36.4 °C)  98.8 °F (37.1 °C)    TempSrc: Oral  Oral    SpO2: 94% 93% (!) 79% 92%   Weight:       Height:         Weight: Weight: 239 lb (108.4 kg)     24 hour intake/output:    Intake/Output Summary (Last 24 hours) at 11/23/2021 1857  Last data filed at 11/23/2021 1136  Gross per 24 hour   Intake --   Output 0 ml   Net 0 ml       Physical Exam  Physical Exam  Vitals reviewed. Constitutional:       General: He is not in acute distress. Appearance: Normal appearance. He is not ill-appearing. Interventions: Nasal cannula in place. HENT:      Head: Normocephalic and atraumatic. Right Ear: External ear normal.      Left Ear: External ear normal.      Nose: Nose normal.      Mouth/Throat:      Mouth: Mucous membranes are moist.   Eyes:      General:         Right eye: No discharge. Left eye: No discharge. Conjunctiva/sclera: Conjunctivae normal.   Cardiovascular:      Rate and Rhythm: Normal rate and regular rhythm. Pulses: Normal pulses. Heart sounds: Normal heart sounds. No murmur heard. Pulmonary:      Effort: Pulmonary effort is normal. No respiratory distress. Breath sounds: Normal breath sounds. No wheezing, rhonchi or rales. Abdominal:      General: Abdomen is flat. Bowel sounds are normal. There is no distension. Palpations: Abdomen is soft. Tenderness: There is no abdominal tenderness. There is no guarding or rebound. Musculoskeletal:         General: No tenderness. Normal range of motion. Cervical back: Normal range of motion and neck supple. Right lower leg: Edema present. Left lower leg: Edema present. Comments: BUE non-pitting edema and BLE 1+ pitting edema   Skin:     General: Skin is warm and dry. Capillary Refill: Capillary refill takes less than 2 seconds. Neurological:      General: No focal deficit present. Mental Status: He is alert and oriented to person, place, and time. Psychiatric:         Mood and Affect: Mood normal.         Behavior: Behavior normal.         Thought Content:  Thought content normal.         Judgment: Judgment normal. Labs: For convenience and continuity at follow-up the following most recent labs are provided:      CBC:    Lab Results   Component Value Date    WBC 9.1 11/23/2021    HGB 10.5 11/23/2021    HCT 35.8 11/23/2021     11/23/2021       Renal:    Lab Results   Component Value Date     11/23/2021    K 5.2 11/23/2021    K 5.1 11/09/2021    CL 98 11/23/2021    CO2 30 11/23/2021    BUN 17 11/23/2021    CREATININE 0.9 11/23/2021    CALCIUM 10.5 11/23/2021    PHOS 2.4 03/10/2017         Significant Diagnostic Studies    Radiology:   CTA CHEST W WO CONTRAST   Final Result       1. No pulmonary emboli. 2. Linear opacities in both lung bases. This is most likely atelectasis. **This report has been created using voice recognition software. It may contain minor errors which are inherent in voice recognition technology. **      Final report electronically signed by Dr. Sun Freire on 11/21/2021 11:34 AM             Consults:     None    Disposition:    [x] Home       [] TCU       [] Rehab       [] Psych       [] SNF       [] [facility]       [] Other-    Condition at Discharge: Stable    Code Status:  Full Code     Patient Instructions:    Discharge lab work: n/a  Activity: activity as tolerated  Diet: ADULT DIET; Regular; 4 carb choices (60 gm/meal)      Follow-up visits:   Mitch Campbell MD  [address] [place] [address] Road [id number] [phone]    On 12/1/2021  Your appointment time is at 8:45am,   , Arrive 15 minutes early, please bring photo ID and medications    Jordana Cifuentes, APRN - CNP  [address] [facility] [address]  [phone]    In 1 week           Discharge Medications:     @DISCHMEDS(<NOROUTINE> error)@    Time Spent on discharge is more than 30 minutes in the examination, evaluation, counseling and review of medications and discharge plan. Signed: Thank you Mitch Campbell MD for the opportunity to be involved in this patient's care.     Electronically signed by Davian Moore MD on 11/23/2021 at 6:57 PM

## 2021-11-23 NOTE — PROGRESS NOTES
PROGRESS NOTE      Patient:  Carina Anthony      Unit/Bed:6-12/012-A    YOB: 1945    MRN: [id number]       Acct: [de-identified]     PCP: Lisa Del Rosario MD    Date of Admission: 11/21/2021      Assessment/Plan:    Anticipated Discharge today    Active Hospital Problems    Diagnosis Date Noted    Hypoxia [R09.02] 11/21/2021       Hypoxia due to acute COPD exacerbation with primary respiratory acidosis - compensated, improving  - CTA chest: no PE, atelectasis  - ABG: primary respiratory acidosis - compensated  - Procalcitonin negative at 0.05 / Pro- / Troponin negative  - COVID negative and vaccinated / Flu A/B negative  - Continue Symbicort, Spiriva, Prednisone 40mg PO daily (x 5 days total)  - Continue Incentive spirometry  - Wean O2 as tolerated to maintain SpO2 >  90%  - If no improvement, consider high resolution CT to assess for parenchymal lung disease  - Recommend follow-up with pulmonology as outpatient. May need updated PFTs. - Home oxygen evaluatoin    Chronic macrocytic anemia, stable. 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 Hypoglycemia protocol in place. Carb control diet. - Continue Humalog Mix 30 units BID. SSI only if needed TID after meals and QHS PRN.  - Discontinue Glipizide due to hypoglycemia. Recommend resuming Metformin on discharge as it does not contribute to hypoglycemia    GERD  - History of Lovina Sacramento syndrome/MEN 1  - Continue Protonix. today, but states he slept in the chair overnight with legs in dependent position because bed was too uncomfortable. Otherwise denies chest pain, palpitations, nausea/vomiting, diarrhea, constipation, leg pain. Medications:  Reviewed    Infusion Medications    sodium chloride      dextrose       Scheduled Medications    atorvastatin  10 mg Oral Nightly    doxazosin  8 mg Oral Daily    pantoprazole  40 mg Oral BID AC    budesonide-formoterol  2 puff Inhalation BID    [Held by provider] glipiZIDE  10 mg Oral BID    insulin lispro protamine & lispro  30 Units SubCUTAneous BID    lisinopril  20 mg Oral Daily    sodium chloride flush  5-40 mL IntraVENous 2 times per day    enoxaparin  40 mg SubCUTAneous Daily    insulin lispro  0-6 Units SubCUTAneous 4x Daily PC & HS    tiotropium  2 puff Inhalation Daily    predniSONE  40 mg Oral Daily     PRN Meds: ipratropium-albuterol, sodium chloride flush, sodium chloride, ondansetron **OR** ondansetron, polyethylene glycol, acetaminophen **OR** acetaminophen, glucose, dextrose, glucagon (rDNA), dextrose      Intake/Output Summary (Last 24 hours) at 11/23/2021 1623  Last data filed at 11/23/2021 1136  Gross per 24 hour   Intake --   Output 0 ml   Net 0 ml       Diet:  ADULT DIET; Regular; 4 carb choices (60 gm/meal)    Exam:  BP (!) 153/65   Pulse 61   Temp 98.8 °F (37.1 °C) (Oral)   Resp 18   Ht 6' 5\" (1.956 m)   Wt 239 lb (108.4 kg)   SpO2 92%   BMI 28.34 kg/m²     Physical Exam  Vitals reviewed. Constitutional:       General: He is not in acute distress. Appearance: Normal appearance. He is not ill-appearing. Interventions: Nasal cannula in place. HENT:      Head: Normocephalic and atraumatic. Right Ear: External ear normal.      Left Ear: External ear normal.      Nose: Nose normal.      Mouth/Throat:      Mouth: Mucous membranes are moist.   Eyes:      General:         Right eye: No discharge. Left eye: No discharge. 0-2 03/01/2017    BLOODU NEGATIVE 03/01/2017    SPECGRAV 1.029 03/01/2017    GLUCOSEU >= 1000 02/28/2017       Radiology:  CTA CHEST W WO CONTRAST   Final Result       1. No pulmonary emboli. 2. Linear opacities in both lung bases. This is most likely atelectasis. **This report has been created using voice recognition software. It may contain minor errors which are inherent in voice recognition technology. **      Final report electronically signed by Dr. Altagracia Salazar on 11/21/2021 11:34 AM          Diet: ADULT DIET;  Regular; 4 carb choices (60 gm/meal)    DVT prophylaxis: [x] Lovenox                                 [] SCDs                                 [] SQ Heparin                                 [] Encourage ambulation           [] Already on Anticoagulation     Disposition:    [x] Home       [] TCU       [] Rehab       [] Psych       [] SNF       [] [facility]       [] Other-    Code Status: Full Code    PT/OT Eval Status: not indicated      Electronically signed by Mary Alonzo MD on 11/23/2021 at 4:23 PM

## 2021-11-23 NOTE — PROGRESS NOTES
Discharge teaching and instructions for diagnosis/procedure of hypoxia completed with patient using teachback method. AVS reviewed. Printed prescriptions given to patient. Patient voiced understanding regarding prescriptions, follow up appointments, and care of self at home. Discharged in a wheelchair to  home with support per wife in stable condition.

## 2021-11-24 ENCOUNTER — CARE COORDINATION (OUTPATIENT)
Dept: CASE MANAGEMENT | Age: 76
End: 2021-11-24

## 2021-11-24 DIAGNOSIS — R09.02 HYPOXIA: Primary | ICD-10-CM

## 2021-11-24 PROCEDURE — 1111F DSCHRG MED/CURRENT MED MERGE: CPT | Performed by: FAMILY MEDICINE

## 2021-11-24 NOTE — CARE COORDINATION
[facility] Transitions Initial Follow Up Call    Call within 2 business days of discharge: Yes    Patient: Aury Teixeira Patient : 1945   MRN: <[id number]>  Reason for Admission: Hypoxia  Discharge Date: 21 RARS: Readmission Risk Score: 11.9 ( )      Last Discharge [facility]       Complaint Diagnosis Description Type Department Provider    21 Shortness of Breath; Leg Swelling Hypoxia . .. ED to Hosp-Admission (Discharged) (ADMITTED) Ion Malagon MD           Spoke with: Ray Prado, patient    Contacted patient for initial care transition follow up. Mr. Robe Larson states \"so far so good\". Reports shortness of breath has not improved but has not worsened. He is using 1 L of oxygen at rest and increasing to 4 L with activity. O2 sats ranging between 93-94% at rest.  O2 sats may drop into the mid 80's to high 80's with activity. States once he sits with the oxygen, he recovers quickly. Denies distress. No c/o chest pain/discomfort, pressure, tightness. Continues to have BLE swellilng. States swelling is no worse than what it was. Recommended he keep legs elevated as much as possible. He verbalized understanding. Reviewed medication list.  He states one of his diabetic medications (glipizide) was discontinued d/t his blood sugar being on the lower side. He confirmed he has picked up his new medications. He has a follow up appointment scheduled with PCP on 21. He does not have any questions or needs at this time. Transitions of Care Initial Call    Was this an external facility discharge? No Discharge Facility: [facility] to be reviewed by the provider   Additional needs identified to be addressed with provider: No  none             Method of communication with provider : none      Advance Care Planning:   Does patient have an Advance Directive: Not on file    Was this a readmission?  No  Patient stated reason for admission: shortness of breath, swelling in legs  Patients top risk factors for readmission: medical condition-COPD, DM, Chronic Respiratory Failure    Care Transition Nurse (CTN) contacted the patient by telephone to perform post hospital discharge assessment. Verified name and  with patient as identifiers. Provided introduction to self, and explanation of the CTN role. CTN reviewed discharge instructions, medical action plan and red flags with patient who verbalized understanding. Patient given an opportunity to ask questions and does not have any further questions or concerns at this time. Were discharge instructions available to patient? Yes. Reviewed appropriate site of care based on symptoms and resources available to patient including: PCP. The patient agrees to contact the PCP office for questions related to their healthcare. Medication reconciliation was performed with patient, who verbalizes understanding of administration of home medications. Reviewed and educated patient on any new and changed medications related to discharge diagnosis. Was patient discharged with a pulse oximeter? No Discussed and confirmed pulse oximeter discharge instructions and when to notify provider or seek emergency care. CTN provided contact information. Plan for follow-up call in 5-7 days based on severity of symptoms and risk factors. Plan for next call: symptom management-any new or worsening symptoms.         Care Transitions 24 Hour Call    Do you have any ongoing symptoms?: Yes  Patient-reported symptoms: Shortness of Breath, Other (Comment: Swelling in BLE)  Do you have a copy of your discharge instructions?: Yes  Do you have all of your prescriptions and are they filled?: Yes  Have you been contacted by a [facility] Pharmacist?: No  Have you scheduled your follow up appointment?: Yes (Comment: appt on 21)  Were you discharged with any Home Care or Post Acute Services: No  Patient Home Equipment: Oxygen  Do you have support at home?: Partner/Spouse/SO  Do you feel like you have everything you need to keep you well at home?: Yes  Are you an active caregiver in your home?: No  Care Transitions Interventions         Follow Up  Future Appointments   Date Time Provider Nancy Espinoza   11/30/2021  2:30 PM Natanael Cadet   12/1/2021  8:45 AM Rosamaria Palacios MD [facility]   3/28/2022 11:00 AM SHONNA Cadet -  [address] [place] [address]       Ivette Bachelor, RN

## 2021-11-30 ENCOUNTER — OFFICE VISIT (OUTPATIENT)
Dept: PULMONOLOGY | Age: 76
End: 2021-11-30
Payer: MEDICARE

## 2021-11-30 VITALS
TEMPERATURE: 97.3 F | SYSTOLIC BLOOD PRESSURE: 136 MMHG | WEIGHT: 237.2 LBS | DIASTOLIC BLOOD PRESSURE: 68 MMHG | OXYGEN SATURATION: 91 % | BODY MASS INDEX: 28.01 KG/M2 | HEIGHT: 77 IN | HEART RATE: 77 BPM

## 2021-11-30 DIAGNOSIS — J98.4 MIXED RESTRICTIVE AND OBSTRUCTIVE LUNG DISEASE (HCC): Primary | ICD-10-CM

## 2021-11-30 DIAGNOSIS — J43.9 MIXED RESTRICTIVE AND OBSTRUCTIVE LUNG DISEASE (HCC): Primary | ICD-10-CM

## 2021-11-30 DIAGNOSIS — J96.11 CHRONIC RESPIRATORY FAILURE WITH HYPOXIA (HCC): ICD-10-CM

## 2021-11-30 DIAGNOSIS — E66.9 OBESITY (BMI 30.0-34.9): ICD-10-CM

## 2021-11-30 DIAGNOSIS — J43.2 CENTRILOBULAR EMPHYSEMA (HCC): ICD-10-CM

## 2021-11-30 DIAGNOSIS — Z92.89 HISTORY OF RECENT HOSPITALIZATION: ICD-10-CM

## 2021-11-30 DIAGNOSIS — G47.34 NOCTURNAL OXYGEN DESATURATION: ICD-10-CM

## 2021-11-30 PROCEDURE — G8484 FLU IMMUNIZE NO ADMIN: HCPCS | Performed by: NURSE PRACTITIONER

## 2021-11-30 PROCEDURE — G8926 SPIRO NO PERF OR DOC: HCPCS | Performed by: NURSE PRACTITIONER

## 2021-11-30 PROCEDURE — 1123F ACP DISCUSS/DSCN MKR DOCD: CPT | Performed by: NURSE PRACTITIONER

## 2021-11-30 PROCEDURE — 99214 OFFICE O/P EST MOD 30 MIN: CPT | Performed by: NURSE PRACTITIONER

## 2021-11-30 PROCEDURE — 4040F PNEUMOC VAC/ADMIN/RCVD: CPT | Performed by: NURSE PRACTITIONER

## 2021-11-30 PROCEDURE — G8417 CALC BMI ABV UP PARAM F/U: HCPCS | Performed by: NURSE PRACTITIONER

## 2021-11-30 PROCEDURE — 1111F DSCHRG MED/CURRENT MED MERGE: CPT | Performed by: NURSE PRACTITIONER

## 2021-11-30 PROCEDURE — 1036F TOBACCO NON-USER: CPT | Performed by: NURSE PRACTITIONER

## 2021-11-30 PROCEDURE — 3023F SPIROM DOC REV: CPT | Performed by: NURSE PRACTITIONER

## 2021-11-30 PROCEDURE — G8427 DOCREV CUR MEDS BY ELIG CLIN: HCPCS | Performed by: NURSE PRACTITIONER

## 2021-11-30 NOTE — PATIENT INSTRUCTIONS
Check your nebulizer solution at home;  Do NOT use box with purple medication is called ipratropium/albuterol     You can use ALbuterol Sulfate - typically just all blue on box  Continue Spiriva and Advair

## 2021-11-30 NOTE — PROGRESS NOTES
[facility]     Patient: Isidro Madrigal, 67 y.o.   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 Sob has gradually been getting worse  Seen at ED for hypoglycemia , Blood sugar in 50's   Seen by Dr Kan Reese MD noticed WBC elevated, tx for CAP with Zpack. No improvement , then started on Ceftin PO on 11/15, went to ED - admitted for hypoxia , discharged on 2021  Now on oxygen: prescribed 1 LPM at rest/ 4LPM with activity- is using compliantly and noticing benefit with use, c/o inconvenience and heaviness of portable tanks - ordered for duration of 3 months by hospital ist   Has been on O2 NC nightly for years   Was not seen by pulmonary in house     Has IVC filter. Denies any leg pain/ swelling   Duplex US legs - neg for DVT   Current Inhalers:  Spiriva respimat ( new from hospital ) , nebulizer solution - he is unsure if albuterol sulfate or Duoneb , Advair 250/50 diskus     Still experiencing SOB with activity ,better than before admission, but not back to baseline  Occ.  Cough , non productive, does not have feeling of mucous in chest, + for leg and hand swelling   Has IS at home, not using   Last PFT - mixed restriction / obstruction   Denies any history of PAM, has never had PSG     Has had all 3 COVID 19 vaccinations   History of spleen cancer s/p Splenectomy   SOCIAL HISTORY:  Social History     Tobacco Use    Smoking status: Former Smoker     Packs/day: 2.00     Years: 20.00     Pack years: 40.00     Types: Cigarettes     Quit date: 1991     Years since quittin.5    Smokeless tobacco: Never Used Substance Use Topics    Alcohol use: Yes     Alcohol/week: 0.0 standard drinks     Comment: rarely    Drug use: No         CURRENT MEDICATIONS:  Current Outpatient Medications   Medication Sig Dispense Refill    BD INSULIN SYRINGE U/F 31G X 5/16\" 0.5 ML MISC USE AS DIRECTED UNDER THE SKIN TWICE DAILY      fluticasone-salmeterol (ADVAIR DISKUS) 250-50 MCG/DOSE AEPB Inhale 1 puff into the lungs 2 times daily 3 each 3    atorvastatin (LIPITOR) 10 MG tablet Take 1 tablet by mouth nightly       doxazosin (CARDURA) 8 MG tablet Take 1 tablet by mouth nightly       albuterol sulfate (PROAIR RESPICLICK) 083 (90 Base) MCG/ACT aerosol powder inhalation Inhale 2 puffs into the lungs every 6 hours as needed for Wheezing or Shortness of Breath 3 Inhaler 3    OXYGEN Inhale into the lungs 1 L at rest and 4 L with activity      sildenafil (VIAGRA) 100 MG tablet Take 100 mg by mouth as needed for Erectile Dysfunction       TRAVATAN Z 0.004 % SOLN ophthalmic solution Place 1 drop into both eyes nightly.  Insulin Lispro Prot & Lispro (HUMALOG MIX 75/25 SC) Inject 30 Units into the skin 2 times daily       quinapril (ACCUPRIL) 20 MG tablet Take 20 mg by mouth 2 times daily.  esomeprazole (NEXIUM) 40 MG capsule Take 40 mg by mouth 2 times daily.  metFORMIN (GLUCOPHAGE) 500 MG tablet Take 1 tablet by mouth 2 times daily (with meals) HOLD 48 HRS PRIOR TO SURGERY 60 tablet 5    tiotropium (SPIRIVA RESPIMAT) 2.5 MCG/ACT AERS inhaler Inhale 2 puffs into the lungs daily 1 each 5    albuterol (PROVENTIL) (2.5 MG/3ML) 0.083% nebulizer solution Take 3 mLs by nebulization every 6 hours as needed for Wheezing or Shortness of Breath 120 each 3    hydroCHLOROthiazide (MICROZIDE) 12.5 MG capsule Take 1 capsule by mouth every morning 30 capsule 5     No current facility-administered medications for this visit. ROS   Review of Systems   Constitutional: Positive for fatigue and unexpected weight change.  Negative for activity change, appetite change, chills and fever. HENT: Negative. Eyes: Negative. Respiratory: Positive for cough and shortness of breath. Negative for wheezing. Cardiovascular: Negative for chest pain, palpitations and leg swelling. Gastrointestinal: Negative for abdominal pain, diarrhea, nausea and vomiting. Genitourinary: Negative. Musculoskeletal: Negative. Skin: Negative. Allergic/Immunologic: Positive for immunocompromised state. Neurological: Negative. Hematological: Does not bruise/bleed easily. Psychiatric/Behavioral: Negative for sleep disturbance and suicidal ideas. Physical exam   /68 (Site: Left Upper Arm, Position: Sitting)   Pulse 77   Temp 97.3 °F (36.3 °C)   Ht 6' 5\" (1.956 m)   Wt 237 lb 3.2 oz (107.6 kg)   SpO2 91% Comment: on 1 lpm O2  BMI 28.13 kg/m²      Wt Readings from Last 3 Encounters:   12/01/21 235 lb (106.6 kg)   11/30/21 237 lb 3.2 oz (107.6 kg)   11/22/21 239 lb (108.4 kg)     Physical Exam  Vitals and nursing note reviewed. Constitutional:       General: He is not in acute distress. Appearance: He is well-developed. He is obese. Interventions: Nasal cannula in place. HENT:      Mouth/Throat:      Lips: Pink. Mouth: Mucous membranes are moist.      Pharynx: Oropharynx is clear. No oropharyngeal exudate or posterior oropharyngeal erythema. Eyes:      Conjunctiva/sclera: Conjunctivae normal.   Neck:      Vascular: No JVD. Cardiovascular:      Rate and Rhythm: Normal rate and regular rhythm. Heart sounds: No murmur heard. No friction rub. Pulmonary:      Effort: Pulmonary effort is normal. No accessory muscle usage or respiratory distress. Breath sounds: Normal breath sounds. No wheezing, rhonchi or rales. Chest:      Chest wall: No tenderness. Musculoskeletal:         General: Swelling (in hands ) present. No tenderness. Right lower leg: Edema present. Left lower leg: Edema present. Skin:     General: Skin is warm and dry. Capillary Refill: Capillary refill takes less than 2 seconds. Nails: There is no clubbing. Neurological:      Mental Status: He is alert and oriented to person, place, and time. Psychiatric:         Mood and Affect: Mood normal.         Behavior: Behavior normal.         Thought Content: Thought content normal.         Judgment: Judgment normal.          Test results   Lung Nodule Screening     [] Qualifies    []Does not qualify   [] Declined    [] Completed     CTA - 11/21/21 - reviewed, unable to import   FINDINGS:           There is adequate opacification of the pulmonary arterial system. No pulmonary emboli are present.  The aorta is within acceptable limits.           There is coronary artery disease. There is cardiomegaly. There is no pericardial or pleural effusion. Tyrese Cogan is no mediastinal, axillary or hilar adenopathy. Contrast was injected from the right. 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 There are numerous collaterals in the right arm and in the upper chest bilaterally. The azygos vein is patent. This is filling the SVC at the level the heart. This is stable compared to 2017.       There is some bibasilar atelectasis and linear opacities. The mid and upper lung zones are relatively well aerated. There is no definite interstitial edema.  No suspicious osseous lesions are present. Tyrese Cogan are no suspicious findings in the imaged    aspects of the upper abdomen.               Impression       1. No pulmonary emboli. 2. Linear opacities in both lung bases. This is most likely atelectasis.                 **This report has been created using voice recognition software. It may contain minor errors which are inherent in voice recognition technology. **       Final report electronically signed by Dr. Arabella Collins on 11/21/2021 11:34 AM        Ref.  Range 11/21/2021 12:33   Source: Unknown L Radial   pH, Blood Gas Latest Ref Range: 7.35 - 7.45  7.33 (L)   PCO2 Latest Ref Range: 35 - 45 mmhg 65 (H)   pO2 Latest Ref Range: 71 - 104 mmhg 90   HCO3 Latest Ref Range: 23 - 28 mmol/l 35 (H)   Base Excess (Calculated) Latest Ref Range: -2.5 - 2.5 mmol/l 6.9 (H)   O2 Sat Latest Units: % 96   Gabriel Test Unknown Positive   IFIO2 Unknown 4   DEVICE Unknown Cannula   COLLECTED BY: Unknown [id number]     Summary   Normal left ventricle size and systolic function. Ejection fraction was   estimated at 65 %. There were no regional left ventricular wall motion   abnormalities and wall thickness was within normal limits. The left atrium is Mildly dilated. Signature      ----------------------------------------------------------------   Electronically signed by Neo Cash MD (Interpreting   physician) on 11/22/2021 at 05:46 PM   ----------------------------------------------------------------    Assessment      Diagnosis Orders   1. Mixed restrictive and obstructive [facility] ([facility]  Full PFT Study With Bronchodilator    Blood Gas, Arterial   2. [facility]ob[facility] emphysema ([facility]  Full PFT Study With Bronchodilator    Blood Gas, Arterial   3. Chronic respiratory failure with hypoxia (HCC)     4. Nocturnal oxygen desaturation     5. Obesity (BMI 30.0-34.9)     6. History of recent hospitalization            Plan    Instructions to patient  Check your nebulizer solution at home;  Do NOT use box with purple medication is called ipratropium/albuterol   You can use ALbuterol Sulfate - typically just all blue on box  Continue Spiriva and Advair   Give this more time to heal from the pneumonia. Unable to answer the question if O2 will be long term or not. Discussed POC devices, do not recommend looking into these at this time.  Recommend we wait until follow up to see if oxygen is going to need to be continued  Schedule for Full PFT and Abg on RA in 1 month  If continued hypercapnia/ hypoxia we may need to consider BiPAP for resp failure vs. Scheduling in lab PSG to r/o PAM - continue oxygen day and night for now as prescribed     Will see [place] in: 1 months    Electronically signed by SHONNA Koenig CNP on 12/1/2021 at 12:33 PM

## 2021-12-01 ENCOUNTER — OFFICE VISIT (OUTPATIENT)
Dept: FAMILY MEDICINE CLINIC | Age: 76
End: 2021-12-01
Payer: MEDICARE

## 2021-12-01 ENCOUNTER — CARE COORDINATION (OUTPATIENT)
Dept: CASE MANAGEMENT | Age: 76
End: 2021-12-01

## 2021-12-01 VITALS
RESPIRATION RATE: 20 BRPM | HEART RATE: 72 BPM | DIASTOLIC BLOOD PRESSURE: 60 MMHG | TEMPERATURE: 98.2 F | SYSTOLIC BLOOD PRESSURE: 124 MMHG | HEIGHT: 69 IN | OXYGEN SATURATION: 93 % | BODY MASS INDEX: 34.8 KG/M2 | WEIGHT: 235 LBS

## 2021-12-01 DIAGNOSIS — E11.9 TYPE 2 DIABETES MELLITUS WITHOUT COMPLICATION, WITHOUT LONG-TERM CURRENT USE OF INSULIN (HCC): ICD-10-CM

## 2021-12-01 DIAGNOSIS — J43.2 CENTRILOBULAR EMPHYSEMA (HCC): Primary | ICD-10-CM

## 2021-12-01 PROCEDURE — 1111F DSCHRG MED/CURRENT MED MERGE: CPT | Performed by: FAMILY MEDICINE

## 2021-12-01 PROCEDURE — 99495 TRANSJ CARE MGMT MOD F2F 14D: CPT | Performed by: FAMILY MEDICINE

## 2021-12-01 RX ORDER — ALBUTEROL SULFATE 2.5 MG/3ML
2.5 SOLUTION RESPIRATORY (INHALATION) EVERY 6 HOURS PRN
Qty: 120 EACH | Refills: 3 | Status: SHIPPED | OUTPATIENT
Start: 2021-12-01

## 2021-12-01 RX ORDER — HYDROCHLOROTHIAZIDE 12.5 MG/1
12.5 CAPSULE, GELATIN COATED ORAL EVERY MORNING
Qty: 30 CAPSULE | Refills: 5 | Status: SHIPPED | OUTPATIENT
Start: 2021-12-01 | End: 2021-12-06

## 2021-12-01 ASSESSMENT — ENCOUNTER SYMPTOMS
SHORTNESS OF BREATH: 1
COUGH: 1
DIARRHEA: 0
VOMITING: 0
EYES NEGATIVE: 1
ABDOMINAL PAIN: 0
WHEEZING: 0
NAUSEA: 0

## 2021-12-05 NOTE — PROGRESS NOTES
Post-Discharge Transitional Care Management Services or Hospital Follow Up      Eugene Jewell   YOB: 1945    Date of Office Visit:  12/1/2021  Date of Hospital Admission: 11/21/21  Date of Hospital Discharge: 11/23/21  Risk of hospital readmission (high >=14%.  Medium >=10%) :Readmission Risk Score: 11.9 ( )      Care management risk score Rising risk (score 2-5) and Complex Care (Scores >=6): 7     Non face to face  following discharge, date last encounter closed (first attempt may have been earlier): 11/24/2021 11:18 AM    Call initiated 2 business days of discharge: Yes    Patient Active Problem List   Diagnosis    Neural foraminal stenosis of cervical spine    Right carpal tunnel syndrome    Type 2 diabetes mellitus ([address]    Zollinger-Castellanos syndrome    Abnormal ECG    Gastrointestinal hemorrhage associated with gastritis    COPD (chronic obstructive pulmonary disease) ([address]    Centrilobular emphysema ([address]    Complicated grief    Adjustment disorder with depressed mood    Chronic respiratory failure with hypoxia (HCC)    Hypoxia    MEN 1 syndrome ([address]    Vitamin D deficiency       Allergies   Allergen Reactions    Propofol Anaphylaxis    Bee Venom        Medications listed as ordered at the time of discharge from hospital  @DISCHARGEMEDSLIST(<NOROUTINE> error)@      Medications marked \"taking\" at this time  Outpatient Medications Marked as Taking for the 12/1/21 encounter (Office Visit) with Ag Clement MD   Medication Sig Dispense Refill    metFORMIN (GLUCOPHAGE) 500 MG tablet Take 1 tablet by mouth 2 times daily (with meals) HOLD 48 HRS PRIOR TO SURGERY 60 tablet 5    tiotropium (SPIRIVA RESPIMAT) 2.5 MCG/ACT AERS inhaler Inhale 2 puffs into the lungs daily 1 each 5    albuterol (PROVENTIL) (2.5 MG/3ML) 0.083% nebulizer solution Take 3 mLs by nebulization every 6 hours as needed for Wheezing or Shortness of Breath 120 each 3    hydroCHLOROthiazide (MICROZIDE) 12.5 MG capsule Take 1 capsule by mouth every morning 30 capsule 5    BD INSULIN SYRINGE U/F 31G X 5/16\" 0.5 ML MISC USE AS DIRECTED UNDER THE SKIN TWICE DAILY      fluticasone-salmeterol (ADVAIR DISKUS) 250-50 MCG/DOSE AEPB Inhale 1 puff into the lungs 2 times daily 3 each 3    atorvastatin (LIPITOR) 10 MG tablet Take 1 tablet by mouth nightly       doxazosin (CARDURA) 8 MG tablet Take 1 tablet by mouth nightly       albuterol sulfate (PROAIR RESPICLICK) 303 (90 Base) MCG/ACT aerosol powder inhalation Inhale 2 puffs into the lungs every 6 hours as needed for Wheezing or Shortness of Breath 3 Inhaler 3    OXYGEN Inhale into the lungs 1 L at rest and 4 L with activity      sildenafil (VIAGRA) 100 MG tablet Take 100 mg by mouth as needed for Erectile Dysfunction       TRAVATAN Z 0.004 % SOLN ophthalmic solution Place 1 drop into both eyes nightly.  Insulin Lispro Prot & Lispro (HUMALOG MIX 75/25 SC) Inject 30 Units into the skin 2 times daily       quinapril (ACCUPRIL) 20 MG tablet Take 20 mg by mouth 2 times daily.  esomeprazole (NEXIUM) 40 MG capsule Take 40 mg by mouth 2 times daily. Medications patient taking as of now reconciled against medications ordered at time of hospital discharge: Yes    Chief Complaint   Patient presents with    [facility]     STR 11/21-11/23 Hypoxia, COPD Exacerbation. On 1L Oxgen at rest and 4 L with activity. Has PFT scheduled for 12/30. History of Present illness - Follow up of Hospital diagnosis(es):    Diagnosis Orders   1. Centrilobular emphysema (HCC)  tiotropium (SPIRIVA RESPIMAT) 2.5 MCG/ACT AERS inhaler    albuterol (PROVENTIL) (2.5 MG/3ML) 0.083% nebulizer solution   2. Type 2 diabetes mellitus without complication, without long-term current use of insulin (HCC)  metFORMIN (GLUCOPHAGE) 500 MG tablet         Inpatient course: Discharge summary reviewed- see chart.     Interval history/Current status: stable    A comprehensive review of systems was negative except for what was noted in the HPI. Vitals:    12/01/21 0847   BP: 124/60   Pulse: 72   Resp: 20   Temp: 98.2 °F (36.8 °C)   TempSrc: Oral   SpO2: 93%   Weight: 235 lb (106.6 kg)   Height: 5' 9\" (1.753 m)     Body mass index is 34.7 kg/m². Wt Readings from Last 3 Encounters:   12/01/21 235 lb (106.6 kg)   11/30/21 237 lb 3.2 oz (107.6 kg)   11/22/21 239 lb (108.4 kg)     BP Readings from Last 3 Encounters:   12/01/21 124/60   11/30/21 136/68   11/23/21 (!) 153/65        Physical Exam:  General Appearance: alert and oriented to person, place and time, well developed and well- nourished, in no acute distress  Skin: warm and dry, no rash or erythema  Head: normocephalic and atraumatic  Eyes: pupils equal, round, and reactive to light, extraocular eye movements intact, conjunctivae normal  ENT: tympanic membrane, external ear and ear canal normal bilaterally, nose without deformity, nasal mucosa and turbinates normal without polyps  Neck: supple and non-tender without mass, no thyromegaly or thyroid nodules, no cervical lymphadenopathy  Pulmonary/Chest: clear to auscultation bilaterally- no wheezes, rales or rhonchi, normal air movement, no respiratory distress  Cardiovascular: normal rate, regular rhythm, normal S1 and S2, no murmurs, rubs, clicks, or gallops, distal pulses intact, no carotid bruits  Abdomen: soft, non-tender, non-distended, normal bowel sounds, no masses or organomegaly  Extremities: no cyanosis, clubbing or edema  Musculoskeletal: normal range of motion, no joint swelling, deformity or tenderness  Neurologic: reflexes normal and symmetric, no cranial nerve deficit, gait, coordination and speech normal    Assessment/Plan:   Diagnosis Orders   1. Centrilobular emphysema (HCC)  tiotropium (SPIRIVA RESPIMAT) 2.5 MCG/ACT AERS inhaler    albuterol (PROVENTIL) (2.5 MG/3ML) 0.083% nebulizer solution   2.  Type 2 diabetes mellitus without complication, without long-term

## 2021-12-06 ENCOUNTER — PATIENT MESSAGE (OUTPATIENT)
Dept: FAMILY MEDICINE CLINIC | Age: 76
End: 2021-12-06

## 2021-12-06 DIAGNOSIS — M79.89 REDNESS AND SWELLING OF LOWER LEG: ICD-10-CM

## 2021-12-06 DIAGNOSIS — M79.604 BILATERAL LEG PAIN: Primary | ICD-10-CM

## 2021-12-06 DIAGNOSIS — R23.8 REDNESS AND SWELLING OF LOWER LEG: ICD-10-CM

## 2021-12-06 DIAGNOSIS — Z79.899 MEDICATION MANAGEMENT: ICD-10-CM

## 2021-12-06 DIAGNOSIS — R22.43 LOCALIZED SWELLING OF BOTH LOWER LEGS: ICD-10-CM

## 2021-12-06 DIAGNOSIS — M79.605 BILATERAL LEG PAIN: Primary | ICD-10-CM

## 2021-12-06 RX ORDER — FUROSEMIDE 40 MG/1
40 TABLET ORAL DAILY
Qty: 30 TABLET | Refills: 2 | Status: SHIPPED | OUTPATIENT
Start: 2021-12-06 | End: 2022-01-17 | Stop reason: SINTOL

## 2021-12-06 NOTE — TELEPHONE ENCOUNTER
From: Clara Santoyo  To: Dr. Jerel Luong: 12/6/2021 10:16 AM EST  Subject: Swollen Legs    Both my legs and ankles are still very swollen tight and the left leg just above the ankle is red and very warm to the touch. Could this be cellulitis? I have been taking the low dosage water pills you gave me last Wednesday with no change. On November 22 the doctor at [facility] told me to STOP taking the Ceftin but I am wondering if I should start taking them again. I have 11 pills/days left on the prescription you gave me on 11/16/21. Also do you feel an ultrasound would be necessary? Please advise. Thank you.

## 2021-12-06 NOTE — TELEPHONE ENCOUNTER
Stop hydrochlorothiazide. Start Lasix 40 mg daily, 30, 2 refills.   BMP in 1 week  Resume Ceftin and complete prescription

## 2021-12-07 ENCOUNTER — PATIENT MESSAGE (OUTPATIENT)
Dept: FAMILY MEDICINE CLINIC | Age: 76
End: 2021-12-07

## 2021-12-07 ENCOUNTER — HOSPITAL ENCOUNTER (OUTPATIENT)
Dept: INTERVENTIONAL RADIOLOGY/VASCULAR | Age: 76
Discharge: HOME | End: 2021-12-07
Payer: MEDICARE

## 2021-12-07 DIAGNOSIS — M79.605 BILATERAL LEG PAIN: ICD-10-CM

## 2021-12-07 DIAGNOSIS — R22.43 LOCALIZED SWELLING OF BOTH LOWER LEGS: ICD-10-CM

## 2021-12-07 DIAGNOSIS — M79.89 REDNESS AND SWELLING OF LOWER LEG: ICD-10-CM

## 2021-12-07 DIAGNOSIS — M79.604 BILATERAL LEG PAIN: ICD-10-CM

## 2021-12-07 DIAGNOSIS — R23.8 REDNESS AND SWELLING OF LOWER LEG: ICD-10-CM

## 2021-12-07 PROCEDURE — 93970 EXTREMITY STUDY: CPT

## 2021-12-07 NOTE — TELEPHONE ENCOUNTER
From: Bunny Ferrera  To: Dr. Davin Lizarraga: 12/7/2021 5:30 PM EST  Subject: Mahendra Faria, I had the ultrasound on both legs this morning as you suggested. I read the test results in [facility] but really don't understand it. Please let me know what the test results were and what the next step is. Thank you.   Jose Dodson

## 2021-12-08 ENCOUNTER — CARE COORDINATION (OUTPATIENT)
Dept: CASE MANAGEMENT | Age: 76
End: 2021-12-08

## 2021-12-08 NOTE — CARE COORDINATION
[facility] Transitions Follow Up Call    2021    Patient: Renita Gonsalez  Patient : 1945   MRN: [id number]  Reason for Admission:   Discharge Date: 21 RARS: Readmission Risk Score: 11.9 ( )         Spoke with: Patient's spouse    Contacted patient for transition follow up. Spoke with patient's spouse. She states that he has not been doing well. She had concerns about his hospital stay. CTN offered patient advocate but she declined. Reports his leg was swollen, red and warm to touch. She states that PCP ordered US and Grzegorz Garcia was found to have a clot in his left leg. Was started on Eliquis. Left leg is swollen and tight. She also reports that he was dizzy earlier. He is currently resting. They do not have a BP cuff to monitor BP. Advised he avoid making any sudden or quick movements and to sit or stand for a few seconds to minutes before moving. She states they will continue to monitor his dizziness and contact PCP if it continues. Continues to use 1 L of oxygen at rest and 4 L with exertion. Denies any distress. She does not have any questions or needs at this time. Care Transitions Follow Up Call    Needs to be reviewed by the provider   Additional needs identified to be addressed with provider: No  none             Method of communication with provider : none      Care Transition Nurse (CTN) contacted the family by telephone to follow up after admission on 21-21. Verified name and  with family as identifiers. Addressed changes since last contact: medications-started on Eliquis for DVT in left leg  Discussed follow-up appointments. If no appointment was previously scheduled, appointment scheduling offered: Yes. Is follow up appointment scheduled within 7 days of discharge? Yes.       CTN reviewed discharge instructions, medical action plan and red flags with family and discussed any barriers to care and/or understanding of plan of care after discharge. Discussed appropriate site of care based on symptoms and resources available to patient including: PCP. The family agrees to contact the PCP office for questions related to their healthcare. Patients top risk factors for readmission: medical condition-COPD, DM, Chronic Respiratory Failure, DVT  Interventions to address risk factors: Education of patient/family/caregiver/guardian to support self-management-signs/symptoms and when to contact provider    CTN provided contact information for future needs. Plan for follow-up call in 5-7 days based on severity of symptoms and risk factors. Plan for next call: symptom management-any new or worsening symptoms      Care Transitions Subsequent and Final Call    Subsequent and Final Calls  Do you have any ongoing symptoms?: Yes  Patient-reported symptoms: Other  Have your medications changed?: Yes  Patient Reports: Per spouse, started on Eliquis  Do you have any questions related to your medications?: No  Do you currently have any active services?: No  Do you have any needs or concerns that I can assist you with?: No  Care Transitions Interventions  Other Interventions:            Follow Up  Future Appointments   Date Time Provider Nancy Espinoza   12/30/2021  8:00 AM [facility] PULMONARY FUNCTION ROOM 1 [facility] PFT [facility]   1/12/2022  9:00 AM Jael Robison MD [facility]  [facility] [place] [place] [address]   1/17/2022  9:00 AM SHONNA Soria CNP N Pulm Med [facility] [place] [facility] [place]   3/28/2022 11:00 AM SHONNA Soria  [address] [place]       Leda Baptiste RN

## 2021-12-09 ENCOUNTER — PATIENT MESSAGE (OUTPATIENT)
Dept: FAMILY MEDICINE CLINIC | Age: 76
End: 2021-12-09

## 2021-12-09 DIAGNOSIS — R42 DIZZINESS: Primary | ICD-10-CM

## 2021-12-10 NOTE — TELEPHONE ENCOUNTER
From: Bunny Ferrera  To: Dr. Davin Lizarraga: 12/9/2021 8:53 PM EST  Subject: Swollen Legs/Blood Clot    Started taking Eliquis 5mg as prescribed on December 7th. ..2 on the 7th at 8:00 pm; 2 am and 2 pm on the 8th and 2 am and 2 pm on the 9th. At 7:30 on the 9th I was very dizzy when I stood up and fell in the bathroom. I may have passed out for a split second? ?? I also hit my head on the door frame but not hard. Friends got me up and except for a headache and back ache I don't feel too bad. Should I continue to take the Eliquis? Thank you.

## 2021-12-11 ENCOUNTER — HOSPITAL ENCOUNTER (OUTPATIENT)
Age: 76
Discharge: HOME | End: 2021-12-11
Payer: MEDICARE

## 2021-12-11 DIAGNOSIS — R42 DIZZINESS: ICD-10-CM

## 2021-12-11 DIAGNOSIS — Z79.899 MEDICATION MANAGEMENT: ICD-10-CM

## 2021-12-11 LAB
ANION GAP SERPL CALCULATED.3IONS-SCNC: 9 MEQ/L (ref 8–16)
BUN BLDV-MCNC: 23 MG/DL (ref 7–22)
CALCIUM SERPL-MCNC: 9.6 MG/DL (ref 8.5–10.5)
CHLORIDE BLD-SCNC: 94 MEQ/L (ref 98–111)
CO2: 34 MEQ/L (ref 23–33)
CREAT SERPL-MCNC: 1.1 MG/DL (ref 0.4–1.2)
ERYTHROCYTE [DISTWIDTH] IN BLOOD BY AUTOMATED COUNT: 12.7 % (ref 11.5–14.5)
ERYTHROCYTE [DISTWIDTH] IN BLOOD BY AUTOMATED COUNT: 45.5 FL (ref 35–45)
GFR SERPL CREATININE-BSD FRML MDRD: 65 ML/MIN/1.73M2
GLUCOSE BLD-MCNC: 247 MG/DL (ref 70–108)
HCT VFR BLD CALC: 39.5 % (ref 42–52)
HEMOGLOBIN: 12 GM/DL (ref 14–18)
MCH RBC QN AUTO: 29.8 PG (ref 26–33)
MCHC RBC AUTO-ENTMCNC: 30.4 GM/DL (ref 32.2–35.5)
MCV RBC AUTO: 98 FL (ref 80–94)
PLATELET # BLD: 320 THOU/MM3 (ref 130–400)
PMV BLD AUTO: 11.4 FL (ref 9.4–12.4)
POTASSIUM SERPL-SCNC: 4.5 MEQ/L (ref 3.5–5.2)
RBC # BLD: 4.03 MILL/MM3 (ref 4.7–6.1)
SODIUM BLD-SCNC: 137 MEQ/L (ref 135–145)
WBC # BLD: 10.1 THOU/MM3 (ref 4.8–10.8)

## 2021-12-11 PROCEDURE — 80048 BASIC METABOLIC PNL TOTAL CA: CPT

## 2021-12-11 PROCEDURE — 85027 COMPLETE CBC AUTOMATED: CPT

## 2021-12-11 PROCEDURE — 36415 COLL VENOUS BLD VENIPUNCTURE: CPT

## 2021-12-13 RX ORDER — APIXABAN 5 MG/1
TABLET, FILM COATED ORAL
Qty: 28 TABLET | Refills: 0 | OUTPATIENT
Start: 2021-12-13

## 2021-12-14 ENCOUNTER — CARE COORDINATION (OUTPATIENT)
Dept: CASE MANAGEMENT | Age: 76
End: 2021-12-14

## 2021-12-14 NOTE — CARE COORDINATION
Che [facility] Transitions Follow Up Call    2021    Patient: Renita Gonsalez  Patient : 1945   MRN: [id number]  Reason for Admission: Hypoxia  Discharge Date: 21 RARS: Readmission Risk Score: 11.9 ( )         Spoke with: Mandi Cartagena, patient    Contacted patient for transition follow up. Mr. Juani Branham states that he is feeling better since medications adjusted. Reports Eliquis decreased to 5 mg BID and holding Lasix per his PCP. States he is no longer having any dizziness. No syncopal episodes or falls since . Denies having any headaches. Reports he continues to use 1 L of oxygen at rest and 4 L of oxygen with activity. Per patient, O2 sats in the high 90's. Reports swelling in leg has decreased and states leg is almost back to normal.  Denies having any redness or warmth. He is aware of when to contact his provider if he continues to have dizziness or with any new or worsening symptoms. He does not have any questions or concerns at this time. No further outreach. Care Transitions Follow Up Call    Needs to be reviewed by the provider   Additional needs identified to be addressed with provider: No  none             Method of communication with provider : none      Care Transition Nurse (CTN) contacted the patient by telephone to follow up after admission on 21-21. Verified name and  with patient as identifiers. Addressed changes since last contact: Medication changes  Discussed follow-up appointments. If no appointment was previously scheduled, appointment scheduling offered: Yes. Is follow up appointment scheduled within 7 days of discharge? Yes. CTN reviewed discharge instructions, medical action plan and red flags with patient and discussed any barriers to care and/or understanding of plan of care after discharge. Discussed appropriate site of care based on symptoms and resources available to patient including: PCP.  The patient agrees to contact the PCP office for questions related to their healthcare. Patients top risk factors for readmission: medical condition-COPD, DM, Chronic Respiratory Failure, DVT  Interventions to address risk factors: Education of patient/family/caregiver/guardian to support self-management-signs/symptoms and when to contact provider    CTN provided contact information for future needs. No further follow-up call indicated based on severity of symptoms and risk factors. Care Transitions Subsequent and Final Call    Subsequent and Final Calls  Do you have any ongoing symptoms?: Yes  Patient-reported symptoms: Other, Shortness of Breath  Have your medications changed?: Yes  Patient Reports: Eliquis decreased to 5 mg BID, hold lasix  Do you have any questions related to your medications?: No  Do you currently have any active services?: No  Do you have any needs or concerns that I can assist you with?: No  Identified Barriers: None  Care Transitions Interventions  Other Interventions:            Follow Up  Future Appointments   Date Time Provider Nancy Espinoza   12/30/2021  8:00 AM [facility] PULMONARY FUNCTION ROOM 1 [facility]   1/12/2022  9:00 AM Maren aBrragan MD SRPX [facility]   1/17/2022  9:00 AM SHONNA Ellis CNP N Pulm Med [facility]   3/28/2022 11:00 AM SHONNA Ellis  [address] [place] [address]       Bee Mcarthur RN

## 2021-12-30 ENCOUNTER — HOSPITAL ENCOUNTER (OUTPATIENT)
Dept: PULMONOLOGY | Age: 76
Discharge: HOME | End: 2021-12-30
Payer: MEDICARE

## 2021-12-30 DIAGNOSIS — J98.4 MIXED RESTRICTIVE AND OBSTRUCTIVE LUNG DISEASE (HCC): ICD-10-CM

## 2021-12-30 DIAGNOSIS — J43.2 CENTRILOBULAR EMPHYSEMA (HCC): ICD-10-CM

## 2021-12-30 DIAGNOSIS — J43.9 MIXED RESTRICTIVE AND OBSTRUCTIVE LUNG DISEASE (HCC): ICD-10-CM

## 2021-12-30 LAB
BASE EXCESS (CALCULATED): 2.1 MMOL/L (ref -2.5–2.5)
COLLECTED BY:: ABNORMAL
DEVICE: ABNORMAL
HCO3: 28 MMOL/L (ref 23–28)
O2 SATURATION: 87 %
PCO2: 50 MMHG (ref 35–45)
PH BLOOD GAS: 7.37 (ref 7.35–7.45)
PO2: 56 MMHG (ref 71–104)
SOURCE, BLOOD GAS: ABNORMAL

## 2021-12-30 PROCEDURE — 94729 DIFFUSING CAPACITY: CPT

## 2021-12-30 PROCEDURE — 82803 BLOOD GASES ANY COMBINATION: CPT

## 2021-12-30 PROCEDURE — 36600 WITHDRAWAL OF ARTERIAL BLOOD: CPT

## 2021-12-30 PROCEDURE — 94726 PLETHYSMOGRAPHY LUNG VOLUMES: CPT

## 2021-12-30 PROCEDURE — 94060 EVALUATION OF WHEEZING: CPT

## 2021-12-30 NOTE — PROGRESS NOTES
Prescreening performed prior to testing. The following symptoms may indicate COVID-19 infection:        One of the following criteria:   Temperature taken, patient temperature was 98.2 F. Fever greater 100.0 F -no  New onset cough -  no  New onset shortness of breath -no  New onset difficulty breathing -no        And/or   Two or more of the following criteria:  New onset muscle aches -no  New onset headache -no  New onset sore throat -no  New onset loss of smell/taste -no  New onset diarrhea -no    Patient's screening was negative. PFT will be performed.

## 2022-01-12 ENCOUNTER — OFFICE VISIT (OUTPATIENT)
Dept: FAMILY MEDICINE CLINIC | Age: 77
End: 2022-01-12
Payer: MEDICARE

## 2022-01-12 VITALS
HEIGHT: 69 IN | RESPIRATION RATE: 20 BRPM | SYSTOLIC BLOOD PRESSURE: 130 MMHG | WEIGHT: 231 LBS | TEMPERATURE: 98.1 F | OXYGEN SATURATION: 98 % | DIASTOLIC BLOOD PRESSURE: 70 MMHG | HEART RATE: 72 BPM | BODY MASS INDEX: 34.21 KG/M2

## 2022-01-12 DIAGNOSIS — J43.2 CENTRILOBULAR EMPHYSEMA (HCC): Primary | ICD-10-CM

## 2022-01-12 DIAGNOSIS — E11.9 TYPE 2 DIABETES MELLITUS WITHOUT COMPLICATION, WITHOUT LONG-TERM CURRENT USE OF INSULIN (HCC): ICD-10-CM

## 2022-01-12 DIAGNOSIS — Z86.718 HISTORY OF DVT (DEEP VEIN THROMBOSIS): ICD-10-CM

## 2022-01-12 PROCEDURE — G8427 DOCREV CUR MEDS BY ELIG CLIN: HCPCS | Performed by: FAMILY MEDICINE

## 2022-01-12 PROCEDURE — 1036F TOBACCO NON-USER: CPT | Performed by: FAMILY MEDICINE

## 2022-01-12 PROCEDURE — 99214 OFFICE O/P EST MOD 30 MIN: CPT | Performed by: FAMILY MEDICINE

## 2022-01-12 PROCEDURE — 4040F PNEUMOC VAC/ADMIN/RCVD: CPT | Performed by: FAMILY MEDICINE

## 2022-01-12 PROCEDURE — 1123F ACP DISCUSS/DSCN MKR DOCD: CPT | Performed by: FAMILY MEDICINE

## 2022-01-12 PROCEDURE — 3023F SPIROM DOC REV: CPT | Performed by: FAMILY MEDICINE

## 2022-01-12 PROCEDURE — G8484 FLU IMMUNIZE NO ADMIN: HCPCS | Performed by: FAMILY MEDICINE

## 2022-01-12 PROCEDURE — G8417 CALC BMI ABV UP PARAM F/U: HCPCS | Performed by: FAMILY MEDICINE

## 2022-01-12 ASSESSMENT — ENCOUNTER SYMPTOMS
NAUSEA: 0
ABDOMINAL DISTENTION: 0
RHINORRHEA: 0
CONSTIPATION: 0
EYE PAIN: 0
COUGH: 0
SINUS PRESSURE: 0
SORE THROAT: 0
DIARRHEA: 0
ABDOMINAL PAIN: 0
SHORTNESS OF BREATH: 1

## 2022-01-12 NOTE — PROGRESS NOTES
[address] [place] [facility] [place] [address]  Dept: [phone]  Dept Fax: [phone]  Loc: [phone]  PROGRESS NOTE      VisitDate: 1/12/2022    Rudi Ortiz is a 67 y.o. male who presents today for:     Chief Complaint   Patient presents with    Follow-up     6 Wk F/u Centrilobular Emphysema, DM. Subjective:  HPI  Patient comes in follow-up emphysema diabetes DVT. COPD is being followed by pulmonary had pulmonary function testing yesterday looks to be obstructive in nature. Continues on oxygen around-the-clock. Patient has diabetes blood sugars been doing okay he had some hypoglycemia earlier this week but that has improved since his glipizide has stopped. Diabetes is followed by Dr. Alayna Womack. History of DVT he has a filter but he had an acute DVT lower extremity he has been on Eliquis for months he will complete 3 months of treatment. Still has some swelling in his lower extremities bilaterally in his left upper extremity. He has been intolerant to water pills in the past causing dizziness and syncope. Review of Systems   Constitutional: Positive for fatigue. Negative for appetite change and fever. HENT: Negative for congestion, ear pain, postnasal drip, rhinorrhea, sinus pressure and sore throat. Eyes: Negative for pain and visual disturbance. Respiratory: Positive for shortness of breath. Negative for cough. Cardiovascular: Positive for leg swelling. Negative for chest pain. Gastrointestinal: Negative for abdominal distention, abdominal pain, constipation, diarrhea and nausea. Genitourinary: Negative for dysuria, frequency and urgency. Musculoskeletal: Negative for arthralgias. Skin: Negative for rash. Neurological: Negative for dizziness.      Past Medical History:   Diagnosis Date    Arthritis     Cancer ([facility] [address] 1990    Spelenectomy, cholycystectomy,partial pancrease, partial stomache, lymphnodes  COPD (chronic obstructive pulmonary disease) (HCC)     Hypertension     Nausea & vomiting     Type II or unspecified type diabetes mellitus without mention of complication, not stated as uncontrolled       Past Surgical History:   Procedure Laterality Date    CHOLECYSTECTOMY      COLONOSCOPY  3/16/12    [address]    CYST REMOVAL      FROM NECK AND BACK    HERNIA REPAIR      OTHER SURGICAL HISTORY  2013    RIGHT POST. CERVICAL LAMINOTOMIES AND FORAMINOTOMIES    PANCREAS SURGERY      partial removal     PARATHYROID GLAND SURGERY      PILONIDAL CYST EXCISION      SPLENECTOMY, TOTAL       Family History   Problem Relation Age of Onset    Cancer Father         zollinger shay syndrome    Cancer Son         carcinoid cancer     Social History     Tobacco Use    Smoking status: Former Smoker     Packs/day: 2.00     Years: 20.00     Pack years: 40.00     Types: Cigarettes     Quit date: 1991     Years since quittin.6    Smokeless tobacco: Never Used   Substance Use Topics    Alcohol use:  Yes     Alcohol/week: 0.0 standard drinks     Comment: rarely      Current Outpatient Medications   Medication Sig Dispense Refill    metFORMIN (GLUCOPHAGE) 500 MG tablet Take 1 tablet by mouth 2 times daily (with meals) HOLD 48 HRS PRIOR TO SURGERY 60 tablet 5    tiotropium (SPIRIVA RESPIMAT) 2.5 MCG/ACT AERS inhaler Inhale 2 puffs into the lungs daily 1 each 5    albuterol (PROVENTIL) (2.5 MG/3ML) 0.083% nebulizer solution Take 3 mLs by nebulization every 6 hours as needed for Wheezing or Shortness of Breath 120 each 3    BD INSULIN SYRINGE U/F 31G X \" 0.5 ML MISC USE AS DIRECTED UNDER THE SKIN TWICE DAILY      fluticasone-salmeterol (ADVAIR DISKUS) 250-50 MCG/DOSE AEPB Inhale 1 puff into the lungs 2 times daily 3 each 3    atorvastatin (LIPITOR) 10 MG tablet Take 1 tablet by mouth nightly       doxazosin (CARDURA) 8 MG tablet Take 1 tablet by mouth nightly       albuterol sulfate (PROAIR RESPICLICK) 464 (90 Base) MCG/ACT aerosol powder inhalation Inhale 2 puffs into the lungs every 6 hours as needed for Wheezing or Shortness of Breath 3 Inhaler 3    OXYGEN Inhale into the lungs 1 L at rest and 3 L with activity      sildenafil (VIAGRA) 100 MG tablet Take 100 mg by mouth as needed for Erectile Dysfunction       TRAVATAN Z 0.004 % SOLN ophthalmic solution Place 1 drop into both eyes nightly.  Insulin Lispro Prot & Lispro (HUMALOG MIX 75/25 SC) Inject 30 Units into the skin 2 times daily       quinapril (ACCUPRIL) 20 MG tablet Take 20 mg by mouth 2 times daily.  esomeprazole (NEXIUM) 40 MG capsule Take 40 mg by mouth 2 times daily.  furosemide (LASIX) 40 MG tablet Take 1 tablet by mouth daily (Patient not taking: Reported on 1/12/2022) 30 tablet 2     No current facility-administered medications for this visit. Allergies   Allergen Reactions    Propofol Anaphylaxis    Bee Venom      Health Maintenance   Topic Date Due    Hepatitis C screen  Never done    Meningococcal (ACWY) vaccine (1 - Risk start 2-23 months series) Never done    Hib vaccine (1 of 1 - Risk 1-dose series) Never done    Meningococcal B vaccine (1 of 4 - Increased Risk Bexsero 2-dose series) Never done    DTaP/Tdap/Td vaccine (1 - Tdap) Never done    Shingles Vaccine (2 of 3) 05/26/2016    Lipid screen  02/11/2022    Depression Screen  03/15/2022    Potassium monitoring  12/11/2022    Creatinine monitoring  12/11/2022    Flu vaccine  Completed    Pneumococcal 65+ yrs at Risk Vaccine  Completed    COVID-19 Vaccine  Completed    Hepatitis A vaccine  Aged Out         Objective:     Physical Exam  Constitutional:       General: He is not in acute distress. Appearance: He is well-developed. He is not diaphoretic. HENT:      Head: Normocephalic and atraumatic.       Right Ear: External ear normal.      Left Ear: External ear normal.   Eyes:      Conjunctiva/sclera: Conjunctivae normal.   Neck: Electronically signed by Omar Billings MD on 1/12/2022 at 10:21 AM

## 2022-01-17 ENCOUNTER — OFFICE VISIT (OUTPATIENT)
Dept: PULMONOLOGY | Age: 77
End: 2022-01-17
Payer: MEDICARE

## 2022-01-17 ENCOUNTER — TELEPHONE (OUTPATIENT)
Dept: PULMONOLOGY | Age: 77
End: 2022-01-17

## 2022-01-17 VITALS
TEMPERATURE: 96.8 F | DIASTOLIC BLOOD PRESSURE: 76 MMHG | HEART RATE: 74 BPM | SYSTOLIC BLOOD PRESSURE: 128 MMHG | HEIGHT: 69 IN | BODY MASS INDEX: 34.39 KG/M2 | OXYGEN SATURATION: 90 % | WEIGHT: 232.2 LBS

## 2022-01-17 DIAGNOSIS — J96.11 CHRONIC RESPIRATORY FAILURE WITH HYPOXIA (HCC): ICD-10-CM

## 2022-01-17 DIAGNOSIS — J96.11 CHRONIC RESPIRATORY FAILURE WITH HYPOXIA AND HYPERCAPNIA (HCC): Primary | ICD-10-CM

## 2022-01-17 DIAGNOSIS — J43.9 MIXED RESTRICTIVE AND OBSTRUCTIVE LUNG DISEASE (HCC): ICD-10-CM

## 2022-01-17 DIAGNOSIS — J96.12 CHRONIC RESPIRATORY FAILURE WITH HYPOXIA AND HYPERCAPNIA (HCC): Primary | ICD-10-CM

## 2022-01-17 DIAGNOSIS — E66.9 OBESITY (BMI 30.0-34.9): ICD-10-CM

## 2022-01-17 DIAGNOSIS — J98.4 MIXED RESTRICTIVE AND OBSTRUCTIVE LUNG DISEASE (HCC): ICD-10-CM

## 2022-01-17 PROCEDURE — 99215 OFFICE O/P EST HI 40 MIN: CPT | Performed by: NURSE PRACTITIONER

## 2022-01-17 PROCEDURE — 94618 PULMONARY STRESS TESTING: CPT | Performed by: NURSE PRACTITIONER

## 2022-01-17 PROCEDURE — 1123F ACP DISCUSS/DSCN MKR DOCD: CPT | Performed by: NURSE PRACTITIONER

## 2022-01-17 PROCEDURE — G8417 CALC BMI ABV UP PARAM F/U: HCPCS | Performed by: NURSE PRACTITIONER

## 2022-01-17 PROCEDURE — 1036F TOBACCO NON-USER: CPT | Performed by: NURSE PRACTITIONER

## 2022-01-17 PROCEDURE — 3023F SPIROM DOC REV: CPT | Performed by: NURSE PRACTITIONER

## 2022-01-17 PROCEDURE — G8427 DOCREV CUR MEDS BY ELIG CLIN: HCPCS | Performed by: NURSE PRACTITIONER

## 2022-01-17 PROCEDURE — 4040F PNEUMOC VAC/ADMIN/RCVD: CPT | Performed by: NURSE PRACTITIONER

## 2022-01-17 PROCEDURE — G8484 FLU IMMUNIZE NO ADMIN: HCPCS | Performed by: NURSE PRACTITIONER

## 2022-01-17 ASSESSMENT — ENCOUNTER SYMPTOMS
APNEA: 0
COUGH: 0
SHORTNESS OF BREATH: 1
CHEST TIGHTNESS: 0
EYES NEGATIVE: 1
ALLERGIC/IMMUNOLOGIC NEGATIVE: 1
WHEEZING: 0

## 2022-01-17 NOTE — PROGRESS NOTES
[facility]     Patient: Humberto Bragg, 67 y.o.   : 1945    Pt of mine      Subjective     Chief Complaint   Patient presents with    Follow-up     Mixed restrictive and obstructive lung disease  2 month pulmonary follow up PFT/ABG 2021        COREY  Enriqueta Dior is here for 2 month  follow up for COPD and RLD    1 LPM at rest/ 4LPM with activity- is using compliantly and noticing benefit with use, c/o inconvenience and heaviness of portable tanks - ordered for duration of 3 months by hospital ist   Has been on 2LPM O2 at night for nocturnal hypoxia since    Has been on O2 NC nightly for years   Was not seen by pulmonary in house     SOB with walking/ activity, rarely coughs, no wheezing, no chest tightness, + swelling in feet/ legs since 2021, he stopped his Lasix due to \"passing out after taking it\"  Having Good and bad days with his breathing     Current Inhalers:  Spiriva Respimat , Albuterol nebs- 2x per day, Advair 250/50 diskus      Are the above symptoms at your baseline Yes  Any recent exacerbations that have required oral atb or steroids No  Any new medical issues since last visit : No       SOCIAL HISTORY:  Social History     Tobacco Use    Smoking status: Former Smoker     Packs/day: 2.00     Years: 20.00     Pack years: 40.00     Types: Cigarettes     Quit date: 1991     Years since quittin.7    Smokeless tobacco: Never Used   Substance Use Topics    Alcohol use:  Yes     Alcohol/week: 0.0 standard drinks     Comment: rarely    Drug use: No         CURRENT MEDICATIONS:  Current Outpatient Medications   Medication Sig Dispense Refill    metFORMIN (GLUCOPHAGE) 500 MG tablet Take 1 tablet by mouth 2 times daily (with meals) HOLD 48 HRS PRIOR TO SURGERY 60 tablet 5    tiotropium (SPIRIVA RESPIMAT) 2.5 MCG/ACT AERS inhaler Inhale 2 puffs into the lungs daily 1 each 5    albuterol (PROVENTIL) (2.5 MG/3ML) 0.083% nebulizer solution Take 3 mLs by nebulization every 6 hours as needed for Wheezing or Shortness of Breath 120 each 3    BD INSULIN SYRINGE U/F 31G X 5/16\" 0.5 ML MISC USE AS DIRECTED UNDER THE SKIN TWICE DAILY      fluticasone-salmeterol (ADVAIR DISKUS) 250-50 MCG/DOSE AEPB Inhale 1 puff into the lungs 2 times daily 3 each 3    atorvastatin (LIPITOR) 10 MG tablet Take 1 tablet by mouth nightly       doxazosin (CARDURA) 8 MG tablet Take 1 tablet by mouth nightly       albuterol sulfate (PROAIR RESPICLICK) 801 (90 Base) MCG/ACT aerosol powder inhalation Inhale 2 puffs into the lungs every 6 hours as needed for Wheezing or Shortness of Breath 3 Inhaler 3    OXYGEN Inhale into the lungs 1 L at rest and 3 L with activity      sildenafil (VIAGRA) 100 MG tablet Take 100 mg by mouth as needed for Erectile Dysfunction       TRAVATAN Z 0.004 % SOLN ophthalmic solution Place 1 drop into both eyes nightly.  Insulin Lispro Prot & Lispro (HUMALOG MIX 75/25 SC) Inject 30 Units into the skin 2 times daily       quinapril (ACCUPRIL) 20 MG tablet Take 20 mg by mouth 2 times daily.  esomeprazole (NEXIUM) 40 MG capsule Take 40 mg by mouth 2 times daily. No current facility-administered medications for this visit. Ila Cold ROS   Review of Systems   Constitutional: Negative. HENT: Negative. Eyes: Negative. Respiratory: Positive for shortness of breath. Negative for apnea, cough, chest tightness and wheezing. Cardiovascular: Positive for leg swelling. Endocrine: Negative. Genitourinary: Negative. Musculoskeletal: Positive for arthralgias. Skin: Negative. Allergic/Immunologic: Negative. Neurological: Positive for syncope (from lasix ). Hematological: Negative. Psychiatric/Behavioral: Negative.          Physical exam   /76 (Site: Left Upper Arm, Position: Sitting)   Pulse 74   Temp 96.8 °F (36 °C)   Ht 5' 9\" (1.753 m)   Wt 232 lb 3.2 oz (105.3 kg)   SpO2 90% Comment: on 1 liter O2  BMI 34.29 kg/m²      Wt Readings from Last 3 Encounters:   01/17/22 232 lb 3.2 oz (105.3 kg)   01/12/22 231 lb (104.8 kg)   12/01/21 235 lb (106.6 kg)     Physical Exam  Vitals and nursing note reviewed. Constitutional:       General: He is not in acute distress. Appearance: He is well-developed and overweight. Interventions: Nasal cannula in place. HENT:      Mouth/Throat:      Lips: Pink. Mouth: Mucous membranes are moist.      Pharynx: Oropharynx is clear. No oropharyngeal exudate or posterior oropharyngeal erythema. Eyes:      Conjunctiva/sclera: Conjunctivae normal.   Neck:      Vascular: No JVD. Cardiovascular:      Rate and Rhythm: Normal rate and regular rhythm. Heart sounds: No murmur heard. No friction rub. Pulmonary:      Effort: Pulmonary effort is normal. No accessory muscle usage or respiratory distress. Breath sounds: Normal breath sounds. No wheezing, rhonchi or rales. Chest:      Chest wall: No tenderness. Musculoskeletal:      Right lower leg: Edema (1+) present. Left lower leg: Edema (1+) present. Skin:     General: Skin is warm and dry. Capillary Refill: Capillary refill takes less than 2 seconds. Nails: There is no clubbing. Neurological:      Mental Status: He is alert. Psychiatric:         Mood and Affect: Mood normal.         Behavior: Behavior normal.         Thought Content: Thought content normal.         Judgment: Judgment normal.          Test results   Lung Nodule Screening     [] Qualifies    [x]Does not qualify   [] Declined    [] Completed     Full PFT 12/30/2021    comparison to 2017 shows worsening restriction and obstruction as well as  decline in diffusion           ABG 12/30/2021     Ref.  Range 12/30/2021 08:54   Source: Unknown R Radial   pH, Blood Gas Latest Ref Range: 7.35 - 7.45  7.37   PCO2 Latest Ref Range: 35 - 45 mmhg 50 (H)   pO2 Latest Ref Range: 71 - 104 mmhg 56 (L)   HCO3 Latest Ref Range: 23 - 28 mmol/l 28   Base Excess (Calculated) Latest Ref Range: -2.5 - 2.5 mmol/l 2.1   O2 Sat Latest Units: % 87   DEVICE Unknown Room Air   COLLECTED BY: Unknown [id number]         Six Minute Walk Test  Robina Hayes 1945    Six minute walk test done in my office today by my medical assistant. Oscars oxygen saturation at rest on room air was 93%. His oxygen saturation dropped to 86% on room air with exertion after walking 108feet and within 1 minutes. The six minute walk test was repeated with oxygen supplementation. Oxygen supplementation was started with 1 LPM via nasal cannula and titrated to 3 LPM via nasal cannula. At the end of the test Neo Hatch's oxygen saturation remained at 91% on 3 LPM with exertion. He is mobile in the home and requires oxygen as outlined above. Patient ambulated a total of 540 feet with oxygen. Resting Dyspnea/Greta score was 0 /10  and 5 / 10  upon completion of the walk. Resting heart rate was  70 bpm and 101 bpm upon completing the walk. Nasal Oxygen order:  3 lpm to be used with:  Rest: No. ( continue 2LPM at night as previously prescribed)   Walking: Yes. Sleep: No.   POC flow: No.  Continuous flow: Yes. DME Medical Necessity Documentation    Stanley Sousa was seen in the office on 1/17/2022 for the diagnosis COPD, RLD. I am prescribing oxygen because the diagnosis and testing requires the patient to have oxygen in the home. his condition will improve or be benefited by oxygen use. The patient is able to perform good mobility in a home setting and therefore does require the use of a portable oxygen system. Assessment      Diagnosis Orders   1. Chronic respiratory failure with hypoxia and hypercapnia ([address]  [facility]    DME Order for BiPAP as OP    Oxygen Therapy   2.  Mixed restrictive and obstructive lung disease ([address]  6 Minute Walk Test    6 Minute Walk Test    [facility]    DME Order for BiPAP as OP    DME Order for Home Oxygen as OP    Oxygen Therapy   3. Chronic respiratory failure with hypoxia (HCC)     4. Obesity (BMI 30.0-34. 9)  [facility]    DME Order for BiPAP as OP     Plan    -oxygen order updated, no longer requiring at rest due to Pneumonia improving. Still needs 3LPM with walking. Pt instructed on new orders. , continue 2LPM at night for now  Will order home BiPAP for CRF w/ hypoxia and hypercapnia 2/2 RLD . Will do overnight pulse on BIPAP 12/6 and RA to see if nocturnal oxygen still needed.     -continue Spiriva/ Advair/ Albuterol for COPD maintenance   -referral to Pulmonary rehab as patient struggles with home ALDS due to dyspnea   -recommend activity within the home as tolerated and weight reduction     -DME order placed for home BiPAP due to patient's Restrictive lung disease with hypercapnia and hypoxia. His COPD is under optimal control and not contributing to his pulmonary limitation as much as his restriction. BiPAP is required to decrease work of breathing, decrease CO2 retention,  improve pulmonary status and interruption of respiratory support could lead to serious harm, I.e., decline in health status, worsening of condition, increased risk of CO2 retention, untimely readmissions, even death. Total time spent on encounter was 40 minutes    Will see Josh Castle in: 3 months with BiPAP Download     Electronically signed by SHONNA Velasquez CNP on 1/17/2022 at 10:53 AM     Addendum:   Overnight pulse ox completed on patient's baseline of 2LPM . Spent < 5 min with SPO2 < 88% therefore he is not going to meet the qualifications to get home BiPAP. ( see Media)   He is to keep 3 month appt to review symptoms   Patient has been notified.    Electronically signed by SHONNA Velasquez CNP on 2/15/2022 at 1:02 PM

## 2022-01-25 ENCOUNTER — FOLLOWUP TELEPHONE ENCOUNTER (OUTPATIENT)
Dept: CARDIAC REHAB | Age: 77
End: 2022-01-25

## 2022-01-25 NOTE — TELEPHONE ENCOUNTER
PULMONARY REHABILITATION REFERRAL  COPD Exacerbation    Pulmonary Rehab Evaluation order received. Spoke with patient about the benefits of supervised comfortable, progressive exercise with oxygen saturation monitoring and pulmonary education. Pt is interested and we set up first appt for 2/15/22 at 10am.  Pt knows to come to the [address] [place] [address] on the 2nd floor of [facility] for the appt.

## 2022-01-25 NOTE — PLAN OF CARE
PHYSICIAN PULMONARY REHABILITATION ORDER  Medical Director:  Dr. Ramona Madera MD    (X)  Phase II Pulmonary [place], supervised exercise with SpO2 / HR monitoring and Oxygen Titration (if necessary) each session, twice weekly, with individualized education sessions. Patient Name: Rudi Ortiz  : 1945  Referring Physician: Colletta Crick CNP  Date: 2022    Medically Necessary Pulmonary Rehab for:  moderate COPD (from my dictation or the PFT report), which is GOLD Stage 3. Physician Prescribed Exercise:  Length of program:  18 weeks, up to 36 sessions, subject to insurance limitations. Program to include aerobic endurance conditioning, resistance training (RT), and flexibility training, and education (all relevant topics including psychosocial assessment). FITT Principles + Progression for Exercise Prescription (also found on the ITP):     Frequency: 2x / wk for 36 sessions    Intensity:   Initial MET level calculated from Initial 6MWT (Feet achieved converted to METs)   Type:        Aerobic and Strength (Treadmill, AD, NuStep, UBE, RT)   Time:      Each session:  31-91 min. of Treatment; Aerobic, RT, Rest breaks and Education  Progression:  1-2 minute increase in Time, per Type, per session, 5-20% increase in Intensity per week if SpO2 >87% AND Greta RPE/RPD is <5      Note:  These are guidelines, the Pulmonary Rehab staff may adjust the treatment to suit the patient's individual needs, goals, oxygen saturation and functional level. Plan of Care:  Pulmonary Rehab aerobic endurance and strength training sessions for a total of 31-91 min/day, including Education time, 2 days/week with suggested supplemented matching minutes of walking at home on most days not participating in Pulmonary Rehab. Patient is willing to cooperate and participate in the plan of care.  Patient is physically able, motivated, and willing to participate in CO, and I expect this patient to improve in a reasonable and predictable time frame. Other Program Components:   (X)6 Minute Walk Test (6 MWT) at program initiation and discharge   (X)Evaluation for oxygen needs while exercising   (X)Titrate Oxygen to maintain >87% SpO2   (X)Stop Exercise or Apply Oxygen if patient desaturates <88%    Measurable Endurance Goal:    -Aerobic endurance goal to be measured in minutes. Start endurance training per patient tolerance at 1-15 minutes per exercise type, progressing to a total of 31-60 minutes using various modes of training (see Exercise Prescription on Individualized Treatment Plan 'ITP'). -Measurable Muscular Strength Goal: Starting at 1-3 lbs x 8 reps, progressing daily/weekly to 5-10 lbs x 15 reps    NOTE:  If patient is a current smoker, patient will participate in tobacco cessation program during Pulmonary Rehab.       Physician Signature/Date/Time:

## 2022-01-28 ENCOUNTER — TELEPHONE (OUTPATIENT)
Dept: PULMONOLOGY | Age: 77
End: 2022-01-28

## 2022-01-28 DIAGNOSIS — J96.11 CHRONIC RESPIRATORY FAILURE WITH HYPOXIA (HCC): Primary | ICD-10-CM

## 2022-01-28 NOTE — TELEPHONE ENCOUNTER
Patient needs overnight pulse ox completed on 2LPM NC. Order was placed 1/17 but I do not see that is was done or scheduled.

## 2022-01-31 ENCOUNTER — TELEPHONE (OUTPATIENT)
Dept: PULMONOLOGY | Age: 77
End: 2022-01-31

## 2022-01-31 DIAGNOSIS — J43.9 MIXED RESTRICTIVE AND OBSTRUCTIVE LUNG DISEASE (HCC): ICD-10-CM

## 2022-01-31 DIAGNOSIS — J96.11 CHRONIC RESPIRATORY FAILURE WITH HYPOXIA (HCC): Primary | ICD-10-CM

## 2022-01-31 DIAGNOSIS — J98.4 MIXED RESTRICTIVE AND OBSTRUCTIVE LUNG DISEASE (HCC): ICD-10-CM

## 2022-02-01 ENCOUNTER — TELEPHONE (OUTPATIENT)
Dept: PULMONOLOGY | Age: 77
End: 2022-02-01

## 2022-02-07 ENCOUNTER — HOSPITAL ENCOUNTER (OUTPATIENT)
Dept: RESPIRATORY THERAPY | Age: 77
Discharge: HOME | End: 2022-02-07
Payer: MEDICARE

## 2022-02-07 PROCEDURE — 94762 N-INVAS EAR/PLS OXIMTRY CONT: CPT

## 2022-02-07 NOTE — PROGRESS NOTES
Instructions were given for an overnight nocturnal pulse oximetry study. The assigned GE number of the pulse oximetry was [id number]. A log sheet was completed. Patient was instructed on documenting any events that occurred throughout the night on the log sheet. The procedure was explained to the learner(s). Patient understanding of the procedure was good. Patient does have a mean of transportation to bring back the study the next day. A patient task was placed in the patients chart for the  to download the nocturnal study and fax the results to the ordering provider for interpretation. The pulse oximetrys memory was cleared. Patient had no questions and was sent home with the pulse oximeter.

## 2022-02-10 DIAGNOSIS — J98.4 MIXED RESTRICTIVE AND OBSTRUCTIVE LUNG DISEASE (HCC): ICD-10-CM

## 2022-02-10 DIAGNOSIS — J43.9 MIXED RESTRICTIVE AND OBSTRUCTIVE LUNG DISEASE (HCC): ICD-10-CM

## 2022-02-10 DIAGNOSIS — J96.11 CHRONIC RESPIRATORY FAILURE WITH HYPOXIA (HCC): ICD-10-CM

## 2022-02-11 ENCOUNTER — TELEPHONE (OUTPATIENT)
Dept: PULMONOLOGY | Age: 77
End: 2022-02-11

## 2022-02-11 DIAGNOSIS — J96.11 CHRONIC RESPIRATORY FAILURE WITH HYPOXIA (HCC): Primary | ICD-10-CM

## 2022-02-11 NOTE — TELEPHONE ENCOUNTER
Called and informed patient about his overnight pulse ox. He voiced understanding. While on the phone he had mentioned that he wanted to try and get a POC. I informed him that I can send you a message and see if you can put in an order for him to do a 6mwt. I let him know that some companies are booking out pretty far with the POC.

## 2022-02-11 NOTE — TELEPHONE ENCOUNTER
There was a POC walk order placed on 1/17/2022, not sure if this never made it to the DME? If needed, please re-print and fax to his DME, it could be that they do not have any available and maybe they never called due to that reason ?

## 2022-02-15 ENCOUNTER — HOSPITAL ENCOUNTER (OUTPATIENT)
Dept: CARDIAC REHAB | Age: 77
Setting detail: THERAPIES SERIES
Discharge: HOME | End: 2022-02-15
Payer: MEDICARE

## 2022-02-15 VITALS — BODY MASS INDEX: 31.99 KG/M2 | WEIGHT: 228.5 LBS | HEIGHT: 71 IN

## 2022-02-15 PROCEDURE — 94625 PHY/QHP OP PULM RHB W/O MNTR: CPT

## 2022-02-15 NOTE — PROGRESS NOTES
[id number]  INITIAL EVALUATION INTERVIEW    Name:  Sonny Meng, : 1945    COVID -19 Screen  Do you have any of the following symptoms:  [] Fever [] Cough [] SOB [] Muscle/Body Ache [] Loss of taste/smell [x] None    Have you traveled outside of the [place]? [] Yes      [x] No    Have you been around anyone who has tested Positive for COVID 19?  [] Yes      [x] No        MEDICAL HISTORY  Diagnosis: Severe COPD, Gold Stage 3. Patients medical history:  Please see H&P from ordering physician (which is in the chart)  Patients family history:  Please see H&P   Respiratory History:  First diagnosed In . Last Hospitalization/ Urgent Care/ ED: 2021  Medications reviewed in [facility]:  Yes  Vaccinations:  Please see H&P  Allergies:  Please see H&P  Oxygen Rx at Rest:  1L/min (x) Continuous  () OCD. With Exercise:  3L/min   (x) Continuous  () OCD     Home CPAP or BIPAP:   No.  Pack Year Smoking history ([email or web address] 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 Alcohol or other substance abuse issues: Please see H&P. Does Dolores Hirbunny have social support from family/friends: No    PHYSICAL ASSESSMENT          BP: 128/64. SpO2: 97% on 1 L/min (x) continuous O2 () OCD. HR: 68.    Breathing Pattern:  Regular/Normal (<12): ()Y (x)N. Ineffective (fast/shallow/using accessory muscles ()Y. Respiratory Rate: 16/min. For Chest Exam, Cardiac Exam, Finger Clubbing, UB and LB evaluation, please see Physicians H&P which is included in the chart. DIAGNOSTIC TESTS                        PFT:  For PFT and physicians interpretation, please see the test results which are included in the chart. SYMPTOM ASSESSMENT  Dyspnea:  1= Not troubled by breathlessness except on strenuous exercise. 2= SOB when hurrying/ walking up hill/ stairs.   3= Walks slower than peers on the level because of breathlessness or has to stop for breath when walking at own pace. 4= Stops for breath after 100m or after a few minutes. 5= Too breathless to leave the house or breathless when dressing/ undressing:  -What level is patient:  4. What brings dyspnea on:  exertion. How often does this happen?: every day, depends on the activity for the day. How intense out of 5 is it: 3. How long does it last?: 1-2 min. Fatigue:  none (), mild (x), moderate (), high (). Cough/ Sputum Production:  (x) No cough    () Non productive cough  () Productive cough only w/ infection    () Productive cough daily <1 Tbsp    () Productive cough daily >1Tbsp    () Nasal congestion    Wheeze:  (x) never, () rarely, () occasionally, () often, () only during activity  Blood in phlegm:  (x) never, () rarely, () occasionally, () often  Chest Pain:  (x) never, () rarely, () occasionally, () often. Postnasal drainage:  () never, () rarely, (x) occasionally, () often  Reflux/ Heartburn: () never, (x) rarely, () occasionally, () often  Swelling in ankles:  () never, (x) rarely, () occasionally, () often  Trouble swallowing:  (x) never, () rarely, () occasionally, () often  Extremity pain:  (x) never, () rarely, () occasionally, () often  Feelings of Anxiety, Panic, Fear or Isolation:  (x) never, () rarely, () occasionally, () often  Symptoms of Depression per pt:  () never, (x) rarely, () occasionally, () often    MUSCULOSKELETAL and EXERCISE ASSESSMENT      Any Physical Limitations/ Problems? : () Strength, () ROM, () Posture, () Functional ability, () Orthopedic limits, () Transferring abilities, (x) Exercise Tolerance, () Exercise Hypoxia, () Gait and Balance    PAIN ASSESSMENT  Location: none    ADL ASSESSMENT   Please see ADL's assessed using [facility] SOBQ in the chart    NUTRITION ASSESSMENT  Height: 5'11\" . Weight:  228.8 lbs. BMI: 31.9.   Any recent weight changes?: No.  Diet habits (type, patterns, etc.); Normal diet. Who does the shopping/ preparing?: pts wife. Fluid intake:  6 cups/day. Does patient take supplemental nutrition or vitamins?: Yes. EDUCATIONAL ASSESSMENT  Knowledge of disease and its treatment via the Health Knowledge Test and self report:  (x) lacking  () good  () very good. Barriers to learning: () Speech  () Hearing  () Vision  () Literacy  () Cognitive  (X) Ready to Learn. Cultural Diversity issues?   No        REHABILITATION POTENTIAL:  excellent (poor, fair, good, excellent)

## 2022-02-15 NOTE — PROGRESS NOTES
[facility] Facility-Based Therapy for COPD   INDIVIDUAL TREATMENT PLAN (ITP)     Name: Raghu Longoria   :  1945  Acct Number: [[id number]]   AGE: 67 y.o. Diagnosis:  Severe COPD, which is GOLD Stage 3                                               PFT:  Post Bronchodilator FEV1/FVC: 65  FEV1: 37    Patient Goals:  (x) Breathe better  (x) Increase my endurance (x) Have more energy  (x) Rely less on others  (x) Ease ADLs  (x) Understand and use meds correctly  (x) Control cough  (x) Make fewer visits to hospital  () Quit smoking  (x) Feel less anxious / have more confidence    Glossary:  TN=Pulmonary Rehab  PF=Physical Fitness TM=Treadmill  AD=Schwinn Airdyne  UBE=UpperBody Ergometer  RT=Resistance Training  PLB=Pursed Lip Breathing      COVID -19 Screen  Do you have any of the following symptoms:  [] Fever [] Cough [] SOB [] Muscle/Body Ache [] Loss of taste/smell [x] None    Have you traveled outside of the [place]? [] Yes      [x] No    Have you been around anyone who has tested Positive for COVID 19?  [] Yes      [x] No    The following Education has been completed with patient. [x] Wait in the lobby until we call you back to rehab. [x] You must wear a mask while in the medical center and in pulmonary rehab unless exercising. Please bring your own. [x] Bring your own bottle of water with you. [x] You can not bring anyone with you in to the rehab clinic at this time. They are welcome to sit in the lobby or wait in the car.       INDIVIDUAL TREATMENT PLAN    INITIAL ASSESSMENT    Day #1 INDIVIDUAL TREATMENT PLAN    PLAN      Day #2-30 INDIVIDUAL TREATMENT PLAN    RE ASSESSMENT    Day #31-60 INDIVIDUAL TREATMENT PLAN    RE ASSESSMENT    Day #61-90 INDIVIDUAL TREATMENT PLAN    RE ASSESSMENT    Day # INDIVIDUAL TREATMENT PLAN    DISCHARGE      Last Day        Date: 2/15/2022     Date:    Date:    Date:    Date:    Date:    EXERCISE   INITIAL ASSESSMENT      Prescribed Oxygen Use  Activity: 3 L/min  (x) Continuous  () Breath Actuated      Oxygen Titration  (x) Assess for desaturation            Current Home Exercise:  None   EXERCISE  PLAN        Current Oxygen Use  Activity:  L/min  () Continuous  () Breath Actuated      Oxygen Titration  () Maintaining >87% Spo2  () Not maintaining -  L/min needed on       Current Home Exercise:  Frequency:  x/wk  Intensity:  /10  Duration:  min  Mode:   EXERCISE  RE ASSESSMENT      Current Oxygen Use  Activity:  L/min  () Continuous  () Breath Actuated      Oxygen Titration  () Maintaining >87% Spo2  () Not maintaining -  L/min needed on       Current Home Exercise:  Frequency:  x/wk  Intensity:  /10  Duration:  min  Mode: EXERCISE  RE ASSESSMENT      Current Oxygen Use  Activity:  L/min  () Continuous  () Breath Actuated      Oxygen Titration  () Maintaining >87% Spo2  () Not maintaining -  L/min needed on       Current Home Exercise:  Frequency:  x/wk  Intensity:  /10  Duration:  min  Mode: EXERCISE  RE ASSESSMENT      Current Oxygen Use  Activity:  L/min  () Continuous  () Breath Actuated      Oxygen Titration  () Maintaining >87% Spo2  () Not maintaining -  L/min needed on       Current Home Exercise:  Frequency:  x/wk  Intensity:  /10  Duration:  min  Mode:   EXERCISE  DISCHARGE        Final Oxygen Use  Activity:  L/min  () Continuous  () Breath Actuated      Oxygen Titration  () Maintaining >87% Spo2  () Not maintaining -  L/min needed on       Current Home Exercise:  Frequency:  x/wk  Intensity:  /10  Duration:  min  Mode:       6 Minute Walk Test (6MWT):  O2 used: 3LPM nasal cannula   960 ft walked = 2.3 METs  SpO2: 88-98%  HR:    RPD: 4  RPE: 6  Number of Breaks: 2   Avg time for break:  both breaks were approx 7 secs  Assist device used: none         6 Minute Walk Test (6MWT):  O2 used:   ft walked =  METs  SpO2:  %  HR:    RPD:   RPE:  Number of Breaks:    Avg time for break:  secs  Assist device used: OUTCOMES:  6MWD Improvement:  > 98'?  () Yes  () No     Exercise Prescription    THR: 71 - 114 bpm    Frequency:  2-3x/wk in [place], 1-3x/wk home activity    Intensity:  METs (from 6MWT)      Time:  15-30 total minutes up to 55 minutes max    Type:  Aerobic (TM, AD, UBE, NuStep), 6 ST, RT 1-10# 8-15 reps    Progression:  Increase 30s - 2 min/mode for Aerobic activity, and increase Intensity 2-5% each week if RPE <5, RPD <5, SpO2 >87% and pt is within [facility] above. Exercise Prescription    () Pt exercising within THR    Frequency:  2-3x/wk in [place], 1-3x/wk home activity    Intensity:  3-5 on Greta Dyspnea/ Fatigue scale    Time:  15-30 total minutes up to 55 minutes max    Type:  Aerobic (TM, AD, UBE, NuStep), 6 ST, RT 1-10# 8-15 reps    Progression:  Increase 30s - 2 min/mode for Aerobic activity, and increase Intensity 2-5% each week if RPE <5, RPD <5, SpO2 >87% and pt is within [facility] above. Exercise Prescription    () Pt exercising within THR    Frequency:  2-3x/wk in [place], 2-5x/wk home activity    Intensity:  3-5 on Greta Dyspnea/ Fatigue scale    Time:  30-45 total minutes up to 55 minutes max    Type:  Aerobic (TM, AD, UBE, NuStep), 6 ST, RT 1-10# 8-15 reps    Progression:  Increase 30s - 2 min/mode for Aerobic activity, and increase Intensity 2-5% each week if RPE <5, RPD <5, SpO2 >87% and pt is within [facility] above. Exercise Prescription    () Pt exercising within THR    Frequency:  2-3x/wk in [place], 2-5x/wk home activity    Intensity:  3-5 on Greta Dyspnea/ Fatigue scale    Time:  30-45 total minutes up to 55 minutes max    Type:  Aerobic (TM, AD, UBE, NuStep), 6 ST, RT 1-10# 8-15 reps    Progression:  Increase 30s - 2 min/mode for Aerobic activity up to 35 minutes, and increase Intensity 2-5% each week if RPE <5, RPD <5, SpO2 >87% and pt is within [facility] above.  Exercise Prescription    () Pt exercising within THR    Frequency:  2-3x/wk in [place], 2-5x/wk home activity    Intensity:  3-5 on Greta Dyspnea/ Fatigue scale    Time:  30-45 total minutes up to 55 minutes max    Type:  Aerobic (TM, AD, UBE, NuStep), 6 ST, RT 1-10# 8-15 reps    Progression:  Increase 30s - 2 min/mode for Aerobic activity up to 35 minutes, and increase Intensity 2-5% each week if RPE <5, RPD <5, SpO2 >87% and pt is within [facility] above. Home Program          (x) Given to patient with current levels, recommendation and guidelines.                                        Initial Levels:  TM:1.7mph,6min  AD:0.9level,6min  NuStep:48W, 6 min  UBE:0.4level, 5 min  RT 3#, 8-15 reps   Current Levels:  TM:  mph,  min  AD:  level,  min  NuStep:  W,  min  UBE:  level, 5 min  RT  #, 8-15 reps   Current Levels:  TM:  mph,  min  AD:  level,  min  NuStep:  W,  min  UBE:  level, 5 min  RT  #, 8-15 reps Current Levels:  TM:  mph,  min  AD:  level,  min  NuStep:  W,  min  UBE:  level, 5 min  RT  #, 8-15 reps Current Levels:  TM:  mph,  min  AD:  level,  min  NuStep:  W,  min  UBE:  level, 5 min  RT  #, 8-15 reps   Final Levels:  TM:  mph,  min  AD:  level,  min  NuStep:  W,  min  UBE:  level, 5 min  RT  #, 8-15 reps     Physical Limitations  Initial Assessment    () Weakness  () Inflexible  () Poor posture  () Gait abnormality  () Balance  () Orthopedic Issues   Physical Limitation  Plan      (x) Strengthen through squats and RT  (x) Appropriate stretches shown  (x)Verbal cues and reminders for posture and gait given  (x) Proper modalities chosen for ortho issues  (x) Give Home activity / stretches/ Warmup/ [place] info   Physical Limitations Reassessment      () Pt progressing  () Pt not progressing Physical Limitations Reassessment      () Pt progressing  () Pt not progressing Physical Limitations Reassessment      () Pt progressing  () Pt not progressing     Physical Limitations  Discharge      () Issues Addressed  () PT/OT suggested       Exercise Goals   Initial Assessment:    (x) Start to, and commit to exercising regularly     (x) Learn about home activity recommendations        (x) Increase PF shown by increasing 6MWD   Exercise Goals   Plan      -Initiate TN 2x/week  -Encourage home exercise    -Attend Home Activity EDUCATION class      -Slowly, safely, progress in TN    Exercise Goals   Reassessment      () Pt is coming regularly  () Pt is sporadic    () Pt has had class  () Pt has not had class        () Pt is progressing  () Pt is not progressing Exercise Goals   Reassessment      () Pt is coming regularly  () Pt is sporadic    () Pt has had class  () Pt has not had class        () Pt is progressing  () Pt is not progressing Exercise Goals   Reassessment      () Pt is coming regularly  () Pt is sporadic    () Pt has had class  () Pt has not had class        () Pt is progressing  () Pt is not progressing   Exercise Goals Discharge      () GOAL Met?  () GOAL not met -      () Pt had EDUCATION class  Date:   () Pt did not have class    () GOAL Met  See 6MWD above  () GOAL not Met:     Exercise Intervention Initial Assessment      () Transportation needed        (x) EDUCATION needed          (x) Motivation needed   Exercise Intervention/ Education   Plan      () [facility] set up        () EDUCATION:  Home activity class to be given        () Positive encouragement, support and motivation to be given   Exercise Intervention/ Education Reassessment      () Pt is attending  () Pt is not attending      () Pt has had class  () Pt has not had class      () Pt is progressing  () Pt not progressing Exercise Intervention/ Education Reassessment      () Pt is attending  () Pt is not attending      () Pt has had class  () Pt has not had class      () Pt is progressing  () Pt not progressing Exercise Intervention/ Education Reassessment      () Pt is attending  () Pt is not attending      () Pt has had class  () Pt has not had class      () Pt is progressing  () Pt not progressing Exercise Intervention/ Edcuation Discharge      () Pt attended most  () Pt cancelled a lot    () Pt has had class  Date: See above  () Pt did not have class    () Pt progressed and did well  () Pt had difficulty-               NUTRITION   INITIAL ASSESSMENT      Height:  511   Weight: 228.8 lbs  BMI: 31.9    30 day goal: 227 Lbs (2-4lbs)    (x) Overweight  () Underweight  () Normal  () Teeth issues  () GI issues  (x) Nutrition knowledge needed  (x) DM   NUTRITION   PLAN        Current Weight:  lbs      Goal achieved?:  Next 30 day goal: Lbs   NUTRITION  RE ASSESSMENT      Current Weight:  lbs      Goal achieved?:  Next 30 day goal: Lbs NUTRITION  RE ASSESSMENT      Current Weight:  lbs      Goal achieved?:  Next 30 day goal: Lbs NUTRITION  RE ASSESSMENT      Current Weight:  lbs      Goal achieved?:  Next 30 day goal: Lbs   NUTRITION DISCHARGE        Current Weight:  lbs  BMI:    Goal achieved?:     Nutrition Goals  Initial Assessment    (x) Pt is DM.   Increase nutrition knowledge and glucose control through diet and exercise      (x) Learn weight strategies to lose weight        (x) Learn about general nutrition          -Achievable weight goal for the end of the program:  217 Lbs (2-4lbs per month recommended)   Nutrition Goals   Plan      () DM:  Attend nutrition class and learn about quality carbohydrates and exercises role in DM    () Attend nutrition class          () Attend nutrition class          () Initiate exercise and give pt hints and tips for weight and will have class for information   Nutrition Goals  Reassessment      () DM: Pt has had class  () DM: Pt has not had class           () Pt has had class  () Pt has not had class       () Pt has had class  () Pt has not had class       () Pt progressing  () Pt not progressing     Nutrition Goals  Reassessment      () DM: Pt has had class  () DM: Pt has not had class           () Pt has had class  () Pt has not had class       () Pt has had class  () Pt has not had class       () Pt progressing  () Pt not progressing     Nutrition Goals  Reassessment      () DM: Pt has had class  () DM: Pt has not had class           () Pt has had class  () Pt has not had class       () Pt has had class  () Pt has not had class       () Pt progressing  () Pt not progressing     Nutrition Goals  Discharge      () Pt had nutrition class  Date:  () Pt has not had class        () Pt has had class  Date: See above  () Pt has not had class    () Pt has class  Date: See above  () Pt has not had class      () Final weight goal achieved  () Pt did not reach desired weight goal - (readdressed nutrition and exercise strategies for future goal attainment)        Nutrition Intervention/ Education   Initial Assessment    (x) Pt needs Nutrition education class        () Pt states does not need class       Nutrition Intervention/ Education  Plan      () Pt will have Nutrition class          () Encourage pt to continue to learn and incorporate self knowledge in to diet choices   Nutrition Intervention/ Education  Reassessment      () Pt has had class  () Pt has not had class      () Pt had questions  () Pt denies having questions Nutrition Intervention/ Education  Reassessment      () Pt has had class  () Pt has not had class      () Pt had questions  () Pt denies having questions Nutrition Intervention/ Education  Reassessment      () Pt has had class  () Pt has not had class      () Pt had questions  () Pt denies having questions Nutrition Intervention/ Education   Discharge      () Pt has had class  Date: See above  () Pt has not had class - Reason:    () Pt had questions that were answered   () Pt denies having questions             PSYCHO SOCIAL INITIAL ASSESSMENT      Depression:  () Self Reported  () In History  () S/Sx noted  (x) Denies  () On Medication  () Follows physician      (x) Give PHQ-9 Depression screening tool                  Dyspnea:  (x) Give pt UCSD SOB survey      (x) Pt gets SOB during activity and ADLs.           (x) Pt has support from home for the program        () Pt does not have support for the program   PSYCHO SOCIAL  PLAN      () Attend Stress/ Depression/ Anxiety Class                Pre Rehab PHQ-9   Score: 4  1-4 Normal  5-9 Mild  10-14 Mod  15-19 Mod-Sev  >19 Severe        Pre Rehab UCSD SOB Score: 42      (x) Attend classes and attend rehab to increase strength and endurance      -Attend Psychosocial class          () Speak with the patient/ family about ability to commit to the program with undue stress/ anxiety   PSYCHO SOCIAL  RE ASSESSMENT    () Pt scored >10 on PHQ-9.   Spoke with pt privately about issues and *             () Pt recommended to contact physician for assistance                See below for ADLs        () Pt has had class  () Pt has not had class        Re assessment of support:  Good, pt has been attending PSYCHO SOCIAL  RE ASSESSMENT    () Pt is coping well  () Pt needs more assistance-              () Pt recommended to contact physician for assistance                See below          () Pt has had class  () Pt has not had           Re assessment of support:  Good, pt has been attending PSYCHO SOCIAL  RE ASSESSMENT    () Pt is coping well  () Pt needs more assistance-              () Pt recommended to contact physician for assistance                See below          () Pt has had class  () Pt has not had class        Re assessment of support:  Good, pt has been attending   PSYCHO SOCIAL DISCHARGE      (x) Pt attended class     Date:              Post Rehab PHQ-9   Depression Score:                Post Rehab UCSD SOB  Score:      (x) Pt is less SOB with ADLs            () Pt had class  Date: See above  () Pt did not have class        () Pt completed program  () Pt had to stop         Psychosocial Goals   Initial Assessment    (x) Maintain/ Decrease Depression Levels      (x) Maintain/ Decrease Anxiety Levels        (x) Learn about Emotional Health          (x) Increase QoL   (CAT tool)          (x) Give the CAT tool for HR QoL      Pre Rehab  CAT score:  9/ 40       Psychosocial Goals  Plan      () Attend Stress/ Anxiety/ Dyspnea - Panic control class      () Attend Stress/ Anxiety/ Dyspnea - Panic control class      () Attend Stress/ Anxiety/ Dyspnea - Panic control class      () Initiate [place], get stronger, more endurance and more confidence              30 day CAT score:  / 40     Psychosocial Goals  Reassessment      () Pt has had class  () Pt has not had class      () Pt has had class  () Pt has not had class      () Pt has had class  () Pt has not had class      () Pt is progressing  () Pt is not progressing              60 day CAT score:  / 40 Psychosocial Goals  Reassessment      () Pt has had class  () Pt has not had class      () Pt has had class  () Pt has not had class      () Pt has had class  () Pt has not had class      () Pt is progressing  () Pt is not progressing              90 day CAT score:  / 40 Psychosocial Goals  Reassessment      () Pt has had class  () Pt has not had class      () Pt has had class  () Pt has not had class      () Pt has had class  () Pt has not had class      () Pt is progressing  () Pt is not progressing              120 day CAT score:  / 40   Psychosocial Goals   Discharge      (x) Pt had class            (x) Pt had class            (x) Pt had class  Date: See above  () Pt did not have class      Post rehab CAT below                  Post Rehab   CAT score:  / 40              OUTCOMES    PHQ-9:  Did pt drop a severity level or maintain in the minimal range?  () Y  () N    UCSD SOB  Did pt decrease their number by 5 or more?  () Y  () N    CAT scores:  Did pt drop by 2 or more points from Pre to Post?  () Y  () N        Psychosocial Intervention/ Education   Initial Assessment    () Pt is being treated for depression/ anxiety        (x) Pt would benefit from [place]s Psychosocial Issues Class (Stress, Depression, Anxiety, and Intimacy   Psychosocial  Intervention/ Education  Plan      () Pt to contact physician if any severe changes occur in mood        () Pt will attend [place]s class   Psychosocial Intervention/ Education Reassessment      () Pt is coping well  () Pt is not coping well         () Pt has had class  () Pt has not had class Psychosocial Intervention/ Education Reassessment      () Pt is coping well  () Pt is not coping well         () Pt has had class  () Pt has not had class Psychosocial Intervention/ Education Reassessment      () Pt is coping well  () Pt is not coping well         () Pt has had class  () Pt has not had class   Psychosocial Intervention/ Education Discharge      () Pt did not need any changes   () Pt needed changes to treatment      (x) Pt had class  Date: See above  () Pt did not have class         Pain   Initial Assessment    (x) N/A  Location:   Duration:   Intensity:  /10  Type:    Pain   Plan      () N/A    () Pts pain is under control  () Pt encouraged to contact physician for pain control   Pain   Reassessment      () N/A    () Pts pain is under control  () Pt encouraged to contact physician for pain control Pain   Reassessment      () N/A    () Pts pain is under control  () Pt encouraged to contact physician for pain control Pain   Reassessment      () N/A    () Pts pain is under control  () Pt encouraged to contact physician for pain control Pain   Discharge      () N/A    () Pts pain is under control  () Pt encouraged to contact physician for pain control           OXYGEN   INITIAL ASSESSMENT    RESTING:  () RA  (x) O2: 1 L/min  (x) Continuous  () Breath Actuated  97% SpO2 / 16 bpm    Prescribed Oxygen Use:  1 L/min at Rest and 3L/min with exertion    DME Company for O2:  [facility]     OXYGEN   PLAN      RESTING:  (x) Monitor needs, administer supplemental oxygen if SpO2 <88% OXYGEN   RE ASSESSMENT    RESTING:  (x) Pt SpO2 >87%  () Pt needs higher flow  () Pt needs less flow OXYGEN   RE ASSESSMENT    RESTING:  (x) Pt SpO2 >87%  () Pt needs higher flow  () Pt needs less flow OXYGEN   RE ASSESSMENT    RESTING:  (x) Pt SpO2 >87%  () Pt needs higher flow  () Pt needs less flow OXYGEN   DISCHARGE      RESTING:  () Pt SpO2 remained >87% on * L/min at rest    () Pts doctor and company contacted for change in Rx   Oxygen Goals   Initial Assessment    (x) Wean, Titrate down, or get off O2 if possible   Oxygen Goals   Plan      (x) Closely monitor SpO2 and HR using pulse oximetry for saturation and HR response, and titrate if appropriate   Oxygen Goals  Reassessment    () RA    On exertion:  () Pt maintaining FiO2  () Pt tolerating lower O2 needs  () Pt needing more O2   Oxygen Goals  Reassessment    () RA    On exertion:  () Pt maintaining FiO2  () Pt tolerating lower O2 needs  () Pt needing more O2 Oxygen Goals  Reassessment    () RA    On exertion:  () Pt maintaining FiO2  () Pt tolerating lower O2 needs  () Pt needing more O2     Oxygen Goals   Discharge    () RA    With Exertion:  () Pt maintained FiO2  () Pt was found to need less O2 - notification sent to physician  () Pt was found to need more O2, notification sent to physician     Oxygen Intervention/ Education  Initial Assessment    (x) Learn / Review about O2 use, safety and travel      () Pt does not wear or have home oxygen    Oxygen Intervention/ Education  Plan      () Attend O2 Use, safety and travel class        () Assess for SpO2 with each exercise   Oxygen Intervention/ Education  Reassessment      () Pt has had class  () Pt has not had class      () Pt is >87%  () Pt has been found to desaturate - physician notified Oxygen Intervention/ Education  Reassessment      () Pt has had class  () Pt has not had class      () Pt is >87%  () Pt has been found to desaturate - physician notified Oxygen Intervention/ Education  Reassessment      () Pt has had class  () Pt has not had class      () Pt is >87%  () Pt has been found to desaturate - physician notified   Oxygen Intervention/ Education  Discharge      () Pt had class  Date:   () Pt did not have class      () Pt did well  () Pt needed to be put on oxygen           TOBACCO USE  INITIAL ASSESSMENT    (x) Pt currently not smoking    () Pt currently smoking        () Pt needs Tobacco Cessation counseling          Smoked 2 ppd for 24 years  Pt quit in 1990.    TOBACCO USE  PLAN      () N/A      Does pt want to quit:   Does pt have a quit date:    () Tobacco Cessation counseling Education if applicable        () Encouraged to contact physician for tools/ nicotine replacement etc.   TOBACCO USE  RE ASSESSMENT    () N/A      Any change in Tobacco use status () N  ()Y      () Pt attended counseling education session            () Pt has contacted physician TOBACCO USE  RE ASSESSMENT    () N/A      Any change in Tobacco use status () N  ()Y      () Pt attended counseling education session            () Pt has contacted physician TOBACCO USE  RE ASSESSMENT    () N/A      Any change in Tobacco use status () N  ()Y      () Pt attended counseling education session            () Pt has contacted physician TOBACCO USE  DISCHARGE            Current Tobacco Use Status:         () Pt attended TC counseling education session  Date:           () Pt contacted physician        NRT product/ medication  :     Tobacco Use Goal  Initial Assessment      (x) Complete Cessation or Maintain Cessation of tobacco products   Tobacco Use Goal Intervention and Education Plan    (x Encourage pt to fight the urge, call quit line, use techniques learned from friends/ class    () Attend Tobacco Cessation class if needed   Tobacco Use  Reassessment          () Pt remains tobacco free            () Pt has had TC counseling session        () Pt still smoking Tobacco Use  Reassessment          () Pt remains tobacco free            () Pt has had TC counseling session        () Pt still smoking Tobacco Use  Reassessment          () Pt remains tobacco free            () Pt has had TC counseling session        () Pt still smoking Tobacco Use  Discharge          () Pt remains tobacco free            () Pt had TC counseling  Date:  See above        () Pt still smoking - gave resources for quitting             MEDICATIONS  (Oral & Inhaled)  INITIAL ASSESSMENT    Pt reports taking his meds properly 100% of the time      (x) Pt is on inhaled medications   MEDICATIONS  PLAN        -Review Rxs purpose, schedule, side effects and importance of compliance during Medication class. Also address Cpap, Vents.        MEDICATIONS  RE ASSESSMENT      Pt reports taking their meds properly  % of the time        () Pt has had class  () Pt has not had class MEDICATIONS  RE ASSESSMENT      Pt reports taking their meds properly  % of the time        () Pt has had class  () Pt has not had class MEDICATIONS  RE ASSESSMENT      Pt reports taking their meds properly  % of the time        () Pt has had class  () Pt has not had class MEDICATIONS  DISCHARGE        Pt reports taking their meds properly  % of the time        ()Pt had med class  Date:  () Pt has not had class       Pt has:  (x) Metered Dose Inhaler  (x) Dry Powder Inhaler  (x) Nebulizer   -Review correct use, timing, technique and cleaning of equipment during Medication class () Pt has had class  () Pt has not had class () Pt has had class  () Pt has not had class () Pt has had class  () Pt has not had class () Pt had class  Date: See above  () Pt has not had class   Medication Goals  Initial Assessment        (x) Take meds properly 100% of the time    (x) Learn about / Review Rxs, and devices Medication Goals  Intervention & Education  Plan      -Follow Rx instructions and ask if questions    -Attend Medication Education class   Medication Goals  Re assessment          () Readdressed questions on any medications    () Pt has had class  () Pt has not had class Medication Goals  Re assessment          () Readdressed questions on any medications    () Pt has had class  () Pt has not had class Medication Goals  Re assessment          () Readdressed questions on any medications    () Pt has had class  () Pt has not had class Medication Goals  Discharge          % proper med usage see above      () Pt had class  Date: See above  () Pt has not had class             ADL  INITIAL ASSESSMENT      (x) Impaired ADL ability  () Need for Assist Devices  (x) Generalized weakness, low functional capacity  (x) Stairs in house               ADL  PLAN        -Initiate WA, RT, and chair squats  -Breathing retraining, PLB, and pacing      -Encourage home activity               ADL  RE ASSESSMENT      () Pt is progressing  () Pt is not progressing        () Pt has initiated home activity  () Pt has not yet initiated  home activity-      () ADLs getting easier  () ADLs not getting easier   ADL  RE ASSESSMENT      () Pt is progressing  () Pt is not progressing        () Pt has initiated home activity  () Pt has not yet initiated  home activity-      () ADLs getting easier  () ADLs not getting easier ADL  RE ASSESSMENT      () Pt is progressing  () Pt is not progressing        () Pt has initiated home activity  () Pt has not yet initiated  home activity-      () ADLs getting easier  () ADLs not getting easier ADL  DISCHARGE        () Pt showed strength and endurance gains  () Pt did not progress      () Pt doing recommended home activity  () Pt not doing home activitiy         ADL Goals   Initial Assessment      (x) Decrease SOB with ADLs              (x) Decreased SOB overall      ADL Goals Intervention/ Education Plan      -Initiate WA, RT and chair squats and increase pts strength and endurance        -Pacing, Breathing retraining       ADL Goals Reassessment        () ADLs getting easier  () ADLs not getting easier          () Pt states activities are getting easier/ able to go longer/ not get as SOB   ADL Goals Reassessment        () ADLs getting easier  () ADLs not getting easier          () Pt states activities are getting easier/ able to go longer/ not get as SOB ADL Goals Reassessment        () ADLs getting easier  () ADLs not getting easier          () Pt states activities are getting easier/ able to go longer/ not get as SOB   ADL Goals   Discharge        Goal achieved?  () Yes  () No            Goal achieved?  () Yes  () No          Outcomes:  See Health and Functioning from the CAT tool and Strength and Endurance from 6 MWD above             EXACERBAT'N PREVENTION & MANAGEMENT  INITIAL ASSESSMENT      Pt reports;  () 0 respiratory infections  (x) 1   () >2  (x) Hospitalized in last 12 months   EXACERBAT'N PREVENTION & MANAGEMENT  PLAN        Address via Disease Self Management and Exacerbation prevention class:    -Hydration for mucus    -Hand Hygiene          -Evaluation of Sputum    -When to call MD  -S/Sx to report  -Decrease exposure to irritants/ exacerbators    -Cleaning of respiratory equipment   EXACERBAT'N PREVENTION & MANAGEMENT  RE ASSESSMENT      () Pt has had class  () Pt has not had class        () Improved hydration    () Correct hand washing techs and alcohol gel usage    () Sputum assessment    () Ability to recognize exacerbation and when to call MD        () Cleaning of respiratory equipment EXACERBAT'N PREVENTION & MANAGEMENT  RE ASSESSMENT      () Pt has had class  () Pt has not had class        () Improved hydration    () Correct hand washing techs and alcohol gel usage    () Sputum assessment    () Ability to recognize exacerbation and when to call MD        () Cleaning of respiratory equipment EXACERBAT'N PREVENTION & MANAGEMENT  RE ASSESSMENT      () Pt has had class  () Pt has not had class        () Improved hydration    () Correct hand washing techs and alcohol gel usage    () Sputum assessment    () Ability to recognize exacerbation and when to call MD        () Cleaning of respiratory equipment EXACERBAT'N PREVENTION & MANAGEMENT  DISCHARGE        () Pt has had class  Date:  () Pt did not have class              () Addressed questions and pt feels comfortable with hydration, hand washing, sputum assessment, exacerbation knowledge, and cleaning of equipment   Exacerbation Prevention & Management Goals  Initial Assessment      (x) Learn ways to stay healthy and not get admitted          (x) Increase knowledge of Lungs, Breathing, Exercise, Nutrition, Mood and controlling anxiety, and stopping the Dyspnea Cycle by attending classes   Exacerbation Prevention & Management Goals Intervention/ Education  Plan    -Attend class and demonstrate disease self management strategies      -Attend all appropriate classes                   Exacerbation Prevention & Management Goals  Reassessment        () Pt has had class  () Pt has not had class        () Pt has had all classes  () Pt has had some classes  () Pt has had little/ not staying for education Exacerbation Prevention & Management Goals  Reassessment        () Pt has had class  () Pt has not had class        () Pt has had all classes  () Pt has had some classes  () Pt has had little/ not staying for education Exacerbation Prevention & Management Goals  Reassessment        () Pt has had class  () Pt has not had class        () Pt has had all classes  () Pt has had most classes  () Pt has had some of the classes  () Pt has had little/ not staying for education Exacerbation Prevention & Management Goals  Discharge        () Pt had class  Date:  See above  () Pt did not have class        () Pt attended all relevant classes and all questions were answered                   PHYSICIAN INTERACTION    MD Initial Assessment Review    (x) Initial  Assessment Reviewed  (x) Treatment Plan and Goals support patient needs/ abilities                  Cosigned and dated below:   Jacqueline Raya MD 30 day and Plan Review:      (x) I have seen the patient in rehab during this 30 day reassessment  (x) I agree with the plan  (x) Continue with progression and instruction  () Continue, but with the following changes:      Cosigned and dated below: Pamela Roland MD 30 day Reassessment Review:    (x) I have seen the patient in rehab during this 30 day reassessment  (x) Continue with the current program  () Continue, but with the following changes:  () Discharge patient      Cosigned and dated below: Pamela Roland MD 30 day Reassessment Review:    (x) I have seen the patient in rehab during this 30 day reassessment  (x) Continue with the current program  () Continue, but with the following changes:  () Discharge patient      Cosigned and dated below: Pamela Roland MD 30 day Reassessment Review:    (x) I have seen the patient in rehab during this 30 day reassessment  (x) Continue with the current program  () Continue, but with the following changes:  () Discharge patient      Cosigned and dated below: Pamela Roland MD 30 day   Discharge Review:    (x) Discharge patient                            Cosigned and dated below:

## 2022-02-16 ENCOUNTER — TELEPHONE (OUTPATIENT)
Dept: PULMONOLOGY | Age: 77
End: 2022-02-16

## 2022-02-16 DIAGNOSIS — J96.11 CHRONIC RESPIRATORY FAILURE WITH HYPOXIA (HCC): Primary | ICD-10-CM

## 2022-02-16 DIAGNOSIS — J98.4 MIXED RESTRICTIVE AND OBSTRUCTIVE LUNG DISEASE (HCC): ICD-10-CM

## 2022-02-16 DIAGNOSIS — J43.9 MIXED RESTRICTIVE AND OBSTRUCTIVE LUNG DISEASE (HCC): ICD-10-CM

## 2022-02-16 NOTE — TELEPHONE ENCOUNTER
I spoke to patient and he still isn't understanding as to why he has to requalify? I told him that he would have to do another test on room air to qualify for o2 at night. He then stated that he has had this for 5 yrs and doesn't understand why this is happening? He feels like he is getting the run around, patient would like someone to call him regarding this.

## 2022-02-16 NOTE — TELEPHONE ENCOUNTER
Order is placed. We will have to explain to the patient why this is needed. We did this test already on 2LPM to try to get him a home BiPAP at night and his oxygen did not drop . Now insurance requiring re-testing on room air to continue the oxygen at night .  I am sure he is not going to be happy

## 2022-02-17 NOTE — TELEPHONE ENCOUNTER
I don't agree with it either, I'm being told by [facility] that it is due to insurance.   I will print and fax this conversation to Andrew Queen at [facility] to have her explain it to the patient

## 2022-02-22 ENCOUNTER — HOSPITAL ENCOUNTER (OUTPATIENT)
Dept: CARDIAC REHAB | Age: 77
Setting detail: THERAPIES SERIES
Discharge: HOME | End: 2022-02-22
Payer: MEDICARE

## 2022-02-22 PROCEDURE — 94626 PHY/QHP OP PULM RHB W/MNTR: CPT

## 2022-02-22 PROCEDURE — 94625 PHY/QHP OP PULM RHB W/O MNTR: CPT

## 2022-02-22 NOTE — PROGRESS NOTES
PULMONARY REHABILITATION / RTR PROGRAM  EDUCATION SESSION      Anup Medrano  Session: _____      ANATOMY/LIVING WITH CHRONIC LUNG DISEASE -  I went over how our lungs work and what happens in our body when we breathe. I then further explained what is happening in their lungs due to chronic lung disease and we discussed the several different types of chronic lung disease. BREATHING RETRAINING - I went over pursed lip breathing and diaphragmatic breathing with the pt. I explained to him/her the importance and benefits of these 2 breathing techniques. I had the pt try pursed lip breathing and explained how useful it can be when SOB. Pt had never heard of this type of breathing before. DYSPNEA CYCLE -  I explained to the Dyspnea cycle to the pt and gave them tips on how to break the cycle. PREVENTING INFECTION - I went over proper handwashing technique and its importance. We also discussed the importance of the flu and pneumonia vaccine and the warning signs of an infection. Pt had no questions at this time. Time spent: 35 Minutes.

## 2022-02-24 ENCOUNTER — HOSPITAL ENCOUNTER (OUTPATIENT)
Dept: CARDIAC REHAB | Age: 77
Setting detail: THERAPIES SERIES
Discharge: HOME | End: 2022-02-24
Payer: MEDICARE

## 2022-02-24 PROCEDURE — 94626 PHY/QHP OP PULM RHB W/MNTR: CPT

## 2022-03-01 ENCOUNTER — HOSPITAL ENCOUNTER (OUTPATIENT)
Dept: CARDIAC REHAB | Age: 77
Setting detail: THERAPIES SERIES
Discharge: HOME | End: 2022-03-01
Payer: MEDICARE

## 2022-03-01 PROCEDURE — 94626 PHY/QHP OP PULM RHB W/MNTR: CPT

## 2022-03-01 NOTE — PROGRESS NOTES
PULMONARY REHABILITATION / RTR PROGRAM  EDUCATION SESSION      Dionne Paez  Session: _____    RESPIRATORY MEDICATIONS - I discussed with Alber Villalpando the purpose, procedure and side effects of all their respiratory medications. The pts home respiratory medications are Proair, Albuterol aerosols, Advair Discus and Spiriva Respimat. MDI / AEROSOLS -  We went over the proper technique for the pts MDI's and/or aerosol treatments. I also discussed the importance of using a spacer with the MDI's and explained to pt that he would only use the spacer with the proair inhaler. Pt states he does not have a spacer and has not used the proair for probably 1 month. I did check pts technique/ inspiratory flow rates for this specific inhalers on the Dial incheck. Pt had good technique with the Advair discus on the Incheck Dial but pt has to breath in a little slower on the Spiriva and proair inhalers. Pt understood and technique re-checked and pt did good on inhaler technique for spiriva and proair also. SECRETION CLEARANCE - I went over techniques to help clear secretions. Also explained the importance of drinking enough fluids/water to keep secretions thin, which makes it easier to cough it out. BREATHING RETRAINING - I previously went over pursed lip breathing and diaphragmatic breathing with the pt. Pt states he has now started using pursed lip breathing when he is SOB and it does help a lot with the SOB. Pts wife also present for the education. They had no questions at this time. Time Spent:  25 minutes.

## 2022-03-03 ENCOUNTER — HOSPITAL ENCOUNTER (OUTPATIENT)
Dept: CARDIAC REHAB | Age: 77
Setting detail: THERAPIES SERIES
Discharge: HOME | End: 2022-03-03
Payer: MEDICARE

## 2022-03-03 PROCEDURE — 94626 PHY/QHP OP PULM RHB W/MNTR: CPT

## 2022-03-08 ENCOUNTER — HOSPITAL ENCOUNTER (OUTPATIENT)
Dept: CARDIAC REHAB | Age: 77
Setting detail: THERAPIES SERIES
Discharge: HOME | End: 2022-03-08
Payer: MEDICARE

## 2022-03-08 PROCEDURE — 94625 PHY/QHP OP PULM RHB W/O MNTR: CPT

## 2022-03-08 PROCEDURE — 94626 PHY/QHP OP PULM RHB W/MNTR: CPT

## 2022-03-08 NOTE — PROGRESS NOTES
PULMONARY REHABILITATION / RTR PROGRAM  EDUCATION SESSION      Latesha Velasco  Session: _____    COPD ZONES - Discussed red, yellow, and green COPD zones with Joey Roldan.  I gave pt a copy of COPD zones and told them to put it somewhere in their house where they will be reminded to evaluate \"What color am I today? \". I went over signs and symptoms for each zone and what they need to do depending on which zone they are in. CAT SCORE - I explained the purpose and procedure of the CAT score to Joey Roldan.  A CAT score was done today and the total was 10(pts previous score was 9). I told the patient that the CAT score needs to be monitored at home and explained what it means if the score increases or decreases. A CAT score copy was given to the patient. EMOTIONAL AND SOCIAL WELL BEING -  We discussed ways to deal with/manage stress and anxiety. Also discussed symptoms of depression. Discussed ways to relax and calm down if stressed or anxious. Pt had no questions at this time.     Time spent: 40 minutes

## 2022-03-10 ENCOUNTER — HOSPITAL ENCOUNTER (OUTPATIENT)
Dept: CARDIAC REHAB | Age: 77
Setting detail: THERAPIES SERIES
Discharge: HOME | End: 2022-03-10
Payer: MEDICARE

## 2022-03-10 PROCEDURE — 94626 PHY/QHP OP PULM RHB W/MNTR: CPT

## 2022-03-11 DIAGNOSIS — J43.2 CENTRILOBULAR EMPHYSEMA (HCC): ICD-10-CM

## 2022-03-15 ENCOUNTER — HOSPITAL ENCOUNTER (OUTPATIENT)
Dept: CARDIAC REHAB | Age: 77
Setting detail: THERAPIES SERIES
Discharge: HOME | End: 2022-03-15
Payer: MEDICARE

## 2022-03-15 NOTE — PROGRESS NOTES
[facility] for COPD   INDIVIDUAL TREATMENT PLAN (ITP)     Name: Dionne Paez   :  1945  Acct Number: [[id number]]   AGE: 67 y.o. Diagnosis:  Severe COPD, which is GOLD Stage 3                                               PFT:  Post Bronchodilator FEV1/FVC: 65  FEV1: 37    Patient Goals:  (x) Breathe better  (x) Increase my endurance (x) Have more energy  (x) Rely less on others  (x) Ease ADLs  (x) Understand and use meds correctly  (x) Control cough  (x) Make fewer visits to hospital  () Quit smoking  (x) Feel less anxious / have more confidence    Glossary:  LA=Pulmonary Rehab  PF=Physical Fitness TM=Treadmill  AD=Schwinn Airdyne  UBE=UpperBody Ergometer  RT=Resistance Training  PLB=Pursed Lip Breathing      COVID -19 Screen  Do you have any of the following symptoms:  [] Fever [] Cough [] SOB [] Muscle/Body Ache [] Loss of taste/smell [x] None    Have you traveled outside of the [place]? [] Yes      [x] No    Have you been around anyone who has tested Positive for COVID 19?  [] Yes      [x] No    The following Education has been completed with patient. [x] Wait in the lobby until we call you back to rehab. [x] You must wear a mask while in the medical center and in pulmonary rehab unless exercising. Please bring your own. [x] Bring your own bottle of water with you. [x] You can not bring anyone with you in to the rehab clinic at this time. They are welcome to sit in the lobby or wait in the car.       INDIVIDUAL TREATMENT PLAN    INITIAL ASSESSMENT    Day #1 INDIVIDUAL TREATMENT PLAN    PLAN      Day #2-30 INDIVIDUAL TREATMENT PLAN    RE ASSESSMENT    Day #31-60 INDIVIDUAL TREATMENT PLAN    RE ASSESSMENT    Day #61-90 INDIVIDUAL TREATMENT PLAN    RE ASSESSMENT    Day # INDIVIDUAL TREATMENT PLAN    DISCHARGE      Last Day        Date: 2/15/2022     Date: 3/15/2022    aJny Song is not here today for reassessment    Date:    Date:    Date: Date:    EXERCISE   INITIAL ASSESSMENT      Prescribed Oxygen Use  Activity: 3 L/min  (x) Continuous  () Breath Actuated      Oxygen Titration  (x) Assess for desaturation            Current Home Exercise:  None   EXERCISE  PLAN        Current Oxygen Use  Activity:  3L/min  (x) Continuous  () Breath Actuated      Oxygen Titration  (x) Maintaining >87% Spo2  () Not maintaining -  L/min needed on       Current Home Exercise:  unknown   EXERCISE  RE ASSESSMENT      Current Oxygen Use  Activity:  L/min  () Continuous  () Breath Actuated      Oxygen Titration  () Maintaining >87% Spo2  () Not maintaining -  L/min needed on       Current Home Exercise:  Frequency:  x/wk  Intensity:  /10  Duration:  min  Mode: EXERCISE  RE ASSESSMENT      Current Oxygen Use  Activity:  L/min  () Continuous  () Breath Actuated      Oxygen Titration  () Maintaining >87% Spo2  () Not maintaining -  L/min needed on       Current Home Exercise:  Frequency:  x/wk  Intensity:  /10  Duration:  min  Mode: EXERCISE  RE ASSESSMENT      Current Oxygen Use  Activity:  L/min  () Continuous  () Breath Actuated      Oxygen Titration  () Maintaining >87% Spo2  () Not maintaining -  L/min needed on       Current Home Exercise:  Frequency:  x/wk  Intensity:  /10  Duration:  min  Mode:   EXERCISE  DISCHARGE        Final Oxygen Use  Activity:  L/min  () Continuous  () Breath Actuated      Oxygen Titration  () Maintaining >87% Spo2  () Not maintaining -  L/min needed on       Current Home Exercise:  Frequency:  x/wk  Intensity:  /10  Duration:  min  Mode:       6 Minute Walk Test (6MWT):  O2 used: 3LPM nasal cannula   960 ft walked = 2.3 METs  SpO2: 88-98%  HR:    RPD: 4  RPE: 6  Number of Breaks: 2   Avg time for break:  both breaks were approx 7 secs  Assist device used: none         6 Minute Walk Test (6MWT):  O2 used:   ft walked =  METs  SpO2:  %  HR:    RPD:   RPE:  Number of Breaks:    Avg time for break:  secs  Assist device used: OUTCOMES:  6MWD Improvement:  > 98'?  () Yes  () No     Exercise Prescription    THR: 71 - 114 bpm    Frequency:  2-3x/wk in HI, 1-3x/wk home activity    Intensity:  METs (from 6MWT)      Time:  15-30 total minutes up to 55 minutes max    Type:  Aerobic (TM, AD, UBE, NuStep), 6 ST, RT 1-10# 8-15 reps    Progression:  Increase 30s - 2 min/mode for Aerobic activity, and increase Intensity 2-5% each week if RPE <5, RPD <5, SpO2 >87% and pt is within [facility] above. Exercise Prescription    (x) Pt exercising within THR    Frequency:  2-3x/wk in HI, 1-3x/wk home activity    Intensity:  3-5 on Greta Dyspnea/ Fatigue scale    Time:  15-30 total minutes up to 55 minutes max    Type:  Aerobic (TM, AD, UBE, NuStep), 6 ST, RT 1-10# 8-15 reps    Progression:  Increase 30s - 2 min/mode for Aerobic activity, and increase Intensity 2-5% each week if RPE <5, RPD <5, SpO2 >87% and pt is within [facility] above. Exercise Prescription    () Pt exercising within THR    Frequency:  2-3x/wk in HI, 2-5x/wk home activity    Intensity:  3-5 on Greta Dyspnea/ Fatigue scale    Time:  30-45 total minutes up to 55 minutes max    Type:  Aerobic (TM, AD, UBE, NuStep), 6 ST, RT 1-10# 8-15 reps    Progression:  Increase 30s - 2 min/mode for Aerobic activity, and increase Intensity 2-5% each week if RPE <5, RPD <5, SpO2 >87% and pt is within [facility] above. Exercise Prescription    () Pt exercising within THR    Frequency:  2-3x/wk in HI, 2-5x/wk home activity    Intensity:  3-5 on Greta Dyspnea/ Fatigue scale    Time:  30-45 total minutes up to 55 minutes max    Type:  Aerobic (TM, AD, UBE, NuStep), 6 ST, RT 1-10# 8-15 reps    Progression:  Increase 30s - 2 min/mode for Aerobic activity up to 35 minutes, and increase Intensity 2-5% each week if RPE <5, RPD <5, SpO2 >87% and pt is within [facility] above.  Exercise Prescription    () Pt exercising within THR    Frequency:  2-3x/wk in HI, 2-5x/wk home activity    Intensity:  3-5 on Greta Dyspnea/ Fatigue scale    Time: 30-45 total minutes up to 55 minutes max    Type:  Aerobic (TM, AD, UBE, NuStep), 6 ST, RT 1-10# 8-15 reps    Progression:  Increase 30s - 2 min/mode for Aerobic activity up to 35 minutes, and increase Intensity 2-5% each week if RPE <5, RPD <5, SpO2 >87% and pt is within [facility] above. Home Program          (x) Given to patient with current levels, recommendation and guidelines.                                        Initial Levels:  TM:1.7mph,6min  AD:0.9level,6min  NuStep:48W, 6 min  UBE:0.4level, 5 min  RT 3#, 8-15 reps   Current Levels:  TM: 1.8 mph, 10 min  AD:  40watts, 8 min  UBE:  22watts, 5 min  RT 3 #, 8-15 reps   Current Levels:  TM:  mph,  min  AD:  level,  min  NuStep:  W,  min  UBE:  level, 5 min  RT  #, 8-15 reps Current Levels:  TM:  mph,  min  AD:  level,  min  NuStep:  W,  min  UBE:  level, 5 min  RT  #, 8-15 reps Current Levels:  TM:  mph,  min  AD:  level,  min  NuStep:  W,  min  UBE:  level, 5 min  RT  #, 8-15 reps   Final Levels:  TM:  mph,  min  AD:  level,  min  NuStep:  W,  min  UBE:  level, 5 min  RT  #, 8-15 reps     Physical Limitations  Initial Assessment    () Weakness  () Inflexible  () Poor posture  () Gait abnormality  () Balance  () Orthopedic Issues   Physical Limitation  Plan      (x) Strengthen through squats and RT  (x) Appropriate stretches shown  (x)Verbal cues and reminders for posture and gait given  (x) Proper modalities chosen for ortho issues  (x) Give Home activity / stretches/ Warmup/ [place] info   Physical Limitations Reassessment      () Pt progressing  () Pt not progressing Physical Limitations Reassessment      () Pt progressing  () Pt not progressing Physical Limitations Reassessment      () Pt progressing  () Pt not progressing     Physical Limitations  Discharge      () Issues Addressed  () PT/OT suggested       Exercise Goals   Initial Assessment:    (x) Start to, and commit to exercising regularly     (x) Learn about home activity recommendations        (x) Increase PF shown by increasing 6MWD   Exercise Goals   Plan      -Initiate NH 2x/week  -Encourage home exercise    -Attend Home Activity EDUCATION class      -Slowly, safely, progress in NH    Exercise Goals   Reassessment      () Pt is coming regularly  () Pt is sporadic    () Pt has had class  () Pt has not had class        () Pt is progressing  () Pt is not progressing Exercise Goals   Reassessment      () Pt is coming regularly  () Pt is sporadic    () Pt has had class  () Pt has not had class        () Pt is progressing  () Pt is not progressing Exercise Goals   Reassessment      () Pt is coming regularly  () Pt is sporadic    () Pt has had class  () Pt has not had class        () Pt is progressing  () Pt is not progressing   Exercise Goals Discharge      () GOAL Met?  () GOAL not met -      () Pt had EDUCATION class  Date:   () Pt did not have class    () GOAL Met  See 6MWD above  () GOAL not Met:     Exercise Intervention Initial Assessment      () Transportation needed        (x) EDUCATION needed          (x) Motivation needed   Exercise Intervention/ Education   Plan      () [facility] [employer] set up        (x) EDUCATION:  Home activity class to be given        (x) Positive encouragement, support and motivation to be given   Exercise Intervention/ Education Reassessment      () Pt is attending  () Pt is not attending      () Pt has had class  () Pt has not had class      () Pt is progressing  () Pt not progressing Exercise Intervention/ Education Reassessment      () Pt is attending  () Pt is not attending      () Pt has had class  () Pt has not had class      () Pt is progressing  () Pt not progressing Exercise Intervention/ Education Reassessment      () Pt is attending  () Pt is not attending      () Pt has had class  () Pt has not had class      () Pt is progressing  () Pt not progressing Exercise Intervention/ Edcuation Discharge      () Pt attended most  () Pt cancelled a lot    () Pt has had class  Date: See above  () Pt did not have class    () Pt progressed and did well  () Pt had difficulty-               NUTRITION   INITIAL ASSESSMENT      Height:  511   Weight: 228.8 lbs  BMI: 31.9    30 day goal: 227 Lbs (2-4lbs)    (x) Overweight  () Underweight  () Normal  () Teeth issues  () GI issues  (x) Nutrition knowledge needed  (x) DM   NUTRITION   PLAN        Current Weight:  228.8lbs      Goal achieved?: no  Next 30 day goal: 227 Lbs   NUTRITION  RE ASSESSMENT      Current Weight:  lbs      Goal achieved?:  Next 30 day goal: Lbs NUTRITION  RE ASSESSMENT      Current Weight:  lbs      Goal achieved?:  Next 30 day goal: Lbs NUTRITION  RE ASSESSMENT      Current Weight:  lbs      Goal achieved?:  Next 30 day goal: Lbs   NUTRITION DISCHARGE        Current Weight:  lbs  BMI:    Goal achieved?:     Nutrition Goals  Initial Assessment    (x) Pt is DM.   Increase nutrition knowledge and glucose control through diet and exercise      (x) Learn weight strategies to lose weight        (x) Learn about general nutrition          -Achievable weight goal for the end of the program:  217 Lbs (2-4lbs per month recommended)   Nutrition Goals   Plan      (x) DM:  Attend nutrition class and learn about quality carbohydrates and exercises role in DM    (x) Attend nutrition class          (x) Attend nutrition class          (x) Initiate exercise and give pt hints and tips for weight and will have class for information   Nutrition Goals  Reassessment      () DM: Pt has had class  () DM: Pt has not had class           () Pt has had class  () Pt has not had class       () Pt has had class  () Pt has not had class       () Pt progressing  () Pt not progressing     Nutrition Goals  Reassessment      () DM: Pt has had class  () DM: Pt has not had class           () Pt has had class  () Pt has not had class       () Pt has had class  () Pt has not had class       () Pt progressing  () Pt not progressing     Nutrition Goals  Reassessment      () DM: Pt has had class  () DM: Pt has not had class           () Pt has had class  () Pt has not had class       () Pt has had class  () Pt has not had class       () Pt progressing  () Pt not progressing     Nutrition Goals  Discharge      () Pt had nutrition class  Date:  () Pt has not had class        () Pt has had class  Date: See above  () Pt has not had class    () Pt has class  Date: See above  () Pt has not had class      () Final weight goal achieved  () Pt did not reach desired weight goal - (readdressed nutrition and exercise strategies for future goal attainment)        Nutrition Intervention/ Education   Initial Assessment    (x) Pt needs Nutrition education class        () Pt states does not need class       Nutrition Intervention/ Education  Plan      (x) Pt will have Nutrition class          (x) Encourage pt to continue to learn and incorporate self knowledge in to diet choices   Nutrition Intervention/ Education  Reassessment      () Pt has had class  () Pt has not had class      () Pt had questions  () Pt denies having questions Nutrition Intervention/ Education  Reassessment      () Pt has had class  () Pt has not had class      () Pt had questions  () Pt denies having questions Nutrition Intervention/ Education  Reassessment      () Pt has had class  () Pt has not had class      () Pt had questions  () Pt denies having questions Nutrition Intervention/ Education   Discharge      () Pt has had class  Date: See above  () Pt has not had class - Reason:    () Pt had questions that were answered   () Pt denies having questions             PSYCHO SOCIAL INITIAL ASSESSMENT      Depression:  () Self Reported  () In History  () S/Sx noted  (x) Denies  () On Medication  () Follows physician      (x) Give PHQ-9 Depression screening tool                  Dyspnea:  (x) Give pt UCSD SOB survey      (x) Pt gets SOB during activity and ADLs.           (x) Pt has support from home for the program        () Pt does not have support for the program   PSYCHO SOCIAL  PLAN      (x) Attend Stress/ Depression/ Anxiety Class                Pre Rehab PHQ-9   Score: 4  1-4 Normal  5-9 Mild  10-14 Mod  15-19 Mod-Sev  >19 Severe        Pre Rehab UCSD SOB Score: 42      (x) Attend classes and attend rehab to increase strength and endurance      -Attend Psychosocial class          () Speak with the patient/ family about ability to commit to the program with undue stress/ anxiety   PSYCHO SOCIAL  RE ASSESSMENT    () Pt scored >10 on PHQ-9.   Spoke with pt privately about issues and *             () Pt recommended to contact physician for assistance                See below for ADLs        () Pt has had class  () Pt has not had class        Re assessment of support:  Good, pt has been attending PSYCHO SOCIAL  RE ASSESSMENT    () Pt is coping well  () Pt needs more assistance-              () Pt recommended to contact physician for assistance                See below          () Pt has had class  () Pt has not had           Re assessment of support:  Good, pt has been attending PSYCHO SOCIAL  RE ASSESSMENT    () Pt is coping well  () Pt needs more assistance-              () Pt recommended to contact physician for assistance                See below          () Pt has had class  () Pt has not had class        Re assessment of support:  Good, pt has been attending   PSYCHO SOCIAL DISCHARGE      (x) Pt attended class     Date:              Post Rehab PHQ-9   Depression Score:                Post Rehab UCSD SOB  Score:      (x) Pt is less SOB with ADLs            () Pt had class  Date: See above  () Pt did not have class        () Pt completed program  () Pt had to stop         Psychosocial Goals   Initial Assessment    (x) Maintain/ Decrease Depression Levels      (x) Maintain/ Decrease Anxiety Levels        (x) Learn about Emotional Health          (x) Increase QoL   (CAT tool)          (x) Give the CAT tool for HR QoL      Pre Rehab  CAT score:  9/ 40       Psychosocial Goals  Plan      (x) Attend Stress/ Anxiety/ Dyspnea - Panic control class      (x) Attend Stress/ Anxiety/ Dyspnea - Panic control class      (x) Attend Stress/ Anxiety/ Dyspnea - Panic control class      (x) Initiate IA, get stronger, more endurance and more confidence              30 day CAT score:  NA / 40     Psychosocial Goals  Reassessment      () Pt has had class  () Pt has not had class      () Pt has had class  () Pt has not had class      () Pt has had class  () Pt has not had class      () Pt is progressing  () Pt is not progressing              60 day CAT score:  / 40 Psychosocial Goals  Reassessment      () Pt has had class  () Pt has not had class      () Pt has had class  () Pt has not had class      () Pt has had class  () Pt has not had class      () Pt is progressing  () Pt is not progressing              90 day CAT score:  / 40 Psychosocial Goals  Reassessment      () Pt has had class  () Pt has not had class      () Pt has had class  () Pt has not had class      () Pt has had class  () Pt has not had class      () Pt is progressing  () Pt is not progressing              120 day CAT score:  / 40   Psychosocial Goals   Discharge      (x) Pt had class            (x) Pt had class            (x) Pt had class  Date: See above  () Pt did not have class      Post rehab CAT below                  Post Rehab   CAT score:  / 40              OUTCOMES    PHQ-9:  Did pt drop a severity level or maintain in the minimal range?  () Y  () N    UCSD SOB  Did pt decrease their number by 5 or more?  () Y  () N    CAT scores:  Did pt drop by 2 or more points from Pre to Post?  () Y  () N        Psychosocial Intervention/ Education   Initial Assessment    () Pt is being treated for depression/ anxiety        (x) Pt would benefit from [facility]s Psychosocial Issues Class (Stress, Depression, Anxiety, and Intimacy   Psychosocial  Intervention/ Education  Plan      (x) Pt to contact physician if any severe changes occur in mood        (x) Pt will attend [place]s class   Psychosocial Intervention/ Education Reassessment      () Pt is coping well  () Pt is not coping well         () Pt has had class  () Pt has not had class Psychosocial Intervention/ Education Reassessment      () Pt is coping well  () Pt is not coping well         () Pt has had class  () Pt has not had class Psychosocial Intervention/ Education Reassessment      () Pt is coping well  () Pt is not coping well         () Pt has had class  () Pt has not had class   Psychosocial Intervention/ Education Discharge      () Pt did not need any changes   () Pt needed changes to treatment      (x) Pt had class  Date: See above  () Pt did not have class         Pain   Initial Assessment    (x) N/A  Location:   Duration:   Intensity:  /10  Type:    Pain   Plan      (x) N/A    (x) Pts pain is under control  () Pt encouraged to contact physician for pain control   Pain   Reassessment      () N/A    () Pts pain is under control  () Pt encouraged to contact physician for pain control Pain   Reassessment      () N/A    () Pts pain is under control  () Pt encouraged to contact physician for pain control Pain   Reassessment      () N/A    () Pts pain is under control  () Pt encouraged to contact physician for pain control Pain   Discharge      () N/A    () Pts pain is under control  () Pt encouraged to contact physician for pain control           OXYGEN   INITIAL ASSESSMENT    RESTING:  () RA  (x) O2: 1 L/min  (x) Continuous  () Breath Actuated  97% SpO2 / 16 bpm    Prescribed Oxygen Use:  1 L/min at Rest and 3L/min with exertion    DME Company for O2:  [facility]     OXYGEN   PLAN      RESTING:  (x) Monitor needs, administer supplemental oxygen if SpO2 <88% OXYGEN   RE ASSESSMENT    RESTING:  (x) Pt SpO2 >87%  () Pt needs higher flow  () Pt needs less flow OXYGEN   RE ASSESSMENT    RESTING:  (x) Pt SpO2 >87%  () Pt needs higher flow  () Pt needs less flow OXYGEN   RE ASSESSMENT    RESTING:  (x) Pt SpO2 >87%  () Pt needs higher flow  () Pt needs less flow OXYGEN   DISCHARGE      RESTING:  () Pt SpO2 remained >87% on * L/min at rest    () Pts doctor and company contacted for change in Rx   Oxygen Goals   Initial Assessment    (x) Wean, Titrate down, or get off O2 if possible   Oxygen Goals   Plan      (x) Closely monitor SpO2 and HR using pulse oximetry for saturation and HR response, and titrate if appropriate   Oxygen Goals  Reassessment    () RA    On exertion:  () Pt maintaining FiO2  () Pt tolerating lower O2 needs  () Pt needing more O2   Oxygen Goals  Reassessment    () RA    On exertion:  () Pt maintaining FiO2  () Pt tolerating lower O2 needs  () Pt needing more O2 Oxygen Goals  Reassessment    () RA    On exertion:  () Pt maintaining FiO2  () Pt tolerating lower O2 needs  () Pt needing more O2     Oxygen Goals   Discharge    () RA    With Exertion:  () Pt maintained FiO2  () Pt was found to need less O2 - notification sent to physician  () Pt was found to need more O2, notification sent to physician     Oxygen Intervention/ Education  Initial Assessment    (x) Learn / Review about O2 use, safety and travel      () Pt does not wear or have home oxygen    Oxygen Intervention/ Education  Plan      (x) Attend O2 Use, safety and travel class        (x) Assess for SpO2 with each exercise   Oxygen Intervention/ Education  Reassessment      () Pt has had class  () Pt has not had class      () Pt is >87%  () Pt has been found to desaturate - physician notified Oxygen Intervention/ Education  Reassessment      () Pt has had class  () Pt has not had class      () Pt is >87%  () Pt has been found to desaturate - physician notified Oxygen Intervention/ Education  Reassessment      () Pt has had class  () Pt has not had class      () Pt is >87%  () Pt has been found to desaturate - physician notified   Oxygen Intervention/ Education  Discharge      () Pt still smoking Tobacco Use  Discharge          () Pt remains tobacco free            () Pt had TC counseling  Date:  See above        () Pt still smoking - gave resources for quitting             MEDICATIONS  (Oral & Inhaled)  INITIAL ASSESSMENT    Pt reports taking his meds properly 100% of the time      (x) Pt is on inhaled medications   MEDICATIONS  PLAN        -Review Rxs purpose, schedule, side effects and importance of compliance during Medication class. Also address Cpap, Vents.        MEDICATIONS  RE ASSESSMENT      Pt reports taking their meds properly  % of the time        () Pt has had class  () Pt has not had class MEDICATIONS  RE ASSESSMENT      Pt reports taking their meds properly  % of the time        () Pt has had class  () Pt has not had class MEDICATIONS  RE ASSESSMENT      Pt reports taking their meds properly  % of the time        () Pt has had class  () Pt has not had class MEDICATIONS  DISCHARGE        Pt reports taking their meds properly  % of the time        ()Pt had med class  Date:  () Pt has not had class       Pt has:  (x) Metered Dose Inhaler  (x) Dry Powder Inhaler  (x) Nebulizer   -Review correct use, timing, technique and cleaning of equipment during Medication class () Pt has had class  () Pt has not had class () Pt has had class  () Pt has not had class () Pt has had class  () Pt has not had class () Pt had class  Date: See above  () Pt has not had class   Medication Goals  Initial Assessment        (x) Take meds properly 100% of the time    (x) Learn about / Review Rxs, and devices Medication Goals  Intervention & Education  Plan      -Follow Rx instructions and ask if questions    -Attend Medication Education class   Medication Goals  Re assessment          () Readdressed questions on any medications    () Pt has had class  () Pt has not had class Medication Goals  Re assessment          () Readdressed questions on any medications    () Pt has had class  () Pt has not had class Medication Goals  Re assessment          () Readdressed questions on any medications    () Pt has had class  () Pt has not had class Medication Goals  Discharge          % proper med usage see above      () Pt had class  Date: See above  () Pt has not had class             ADL  INITIAL ASSESSMENT      (x) Impaired ADL ability  () Need for Assist Devices  (x) Generalized weakness, low functional capacity  (x) Stairs in house               ADL  PLAN        -Initiate CT, RT, and chair squats  -Breathing retraining, PLB, and pacing      -Encourage home activity               ADL  RE ASSESSMENT      () Pt is progressing  () Pt is not progressing        () Pt has initiated home activity  () Pt has not yet initiated  home activity-      () ADLs getting easier  () ADLs not getting easier   ADL  RE ASSESSMENT      () Pt is progressing  () Pt is not progressing        () Pt has initiated home activity  () Pt has not yet initiated  home activity-      () ADLs getting easier  () ADLs not getting easier ADL  RE ASSESSMENT      () Pt is progressing  () Pt is not progressing        () Pt has initiated home activity  () Pt has not yet initiated  home activity-      () ADLs getting easier  () ADLs not getting easier ADL  DISCHARGE        () Pt showed strength and endurance gains  () Pt did not progress      () Pt doing recommended home activity  () Pt not doing home activitiy         ADL Goals   Initial Assessment      (x) Decrease SOB with ADLs              (x) Decreased SOB overall      ADL Goals Intervention/ Education Plan      -Initiate CT, RT and chair squats and increase pts strength and endurance        -Pacing, Breathing retraining       ADL Goals Reassessment        () ADLs getting easier  () ADLs not getting easier          () Pt states activities are getting easier/ able to go longer/ not get as SOB   ADL Goals Reassessment        () ADLs getting easier  () ADLs not getting easier          () Pt states activities are getting easier/ able to go longer/ not get as SOB ADL Goals Reassessment        () ADLs getting easier  () ADLs not getting easier          () Pt states activities are getting easier/ able to go longer/ not get as SOB   ADL Goals   Discharge        Goal achieved?  () Yes  () No            Goal achieved?  () Yes  () No          Outcomes:  See Health and Functioning from the CAT tool and Strength and Endurance from 6 MWD above             EXACERBAT'N PREVENTION & MANAGEMENT  INITIAL ASSESSMENT      Pt reports;  () 0 respiratory infections  (x) 1   () >2  (x) Hospitalized in last 12 months   EXACERBAT'N PREVENTION & MANAGEMENT  PLAN        Address via Disease Self Management and Exacerbation prevention class:    -Hydration for mucus    -Hand Hygiene          -Evaluation of Sputum    -When to call MD  -S/Sx to report  -Decrease exposure to irritants/ exacerbators    -Cleaning of respiratory equipment   EXACERBAT'N PREVENTION & MANAGEMENT  RE ASSESSMENT      () Pt has had class  () Pt has not had class        () Improved hydration    () Correct hand washing techs and alcohol gel usage    () Sputum assessment    () Ability to recognize exacerbation and when to call MD        () Cleaning of respiratory equipment EXACERBAT'N PREVENTION & MANAGEMENT  RE ASSESSMENT      () Pt has had class  () Pt has not had class        () Improved hydration    () Correct hand washing techs and alcohol gel usage    () Sputum assessment    () Ability to recognize exacerbation and when to call MD        () Cleaning of respiratory equipment EXACERBAT'N PREVENTION & MANAGEMENT  RE ASSESSMENT      () Pt has had class  () Pt has not had class        () Improved hydration    () Correct hand washing techs and alcohol gel usage    () Sputum assessment    () Ability to recognize exacerbation and when to call MD        () Cleaning of respiratory equipment EXACERBAT'N PREVENTION & MANAGEMENT  DISCHARGE        () Pt has had class  Date:  () Pt did not have class              () Addressed questions and pt feels comfortable with hydration, hand washing, sputum assessment, exacerbation knowledge, and cleaning of equipment   Exacerbation Prevention & Management Goals  Initial Assessment      (x) Learn ways to stay healthy and not get admitted          (x) Increase knowledge of Lungs, Breathing, Exercise, Nutrition, Mood and controlling anxiety, and stopping the Dyspnea Cycle by attending classes   Exacerbation Prevention & Management Goals Intervention/ Education  Plan    -Attend class and demonstrate disease self management strategies      -Attend all appropriate classes                   Exacerbation Prevention & Management Goals  Reassessment        () Pt has had class  () Pt has not had class        () Pt has had all classes  () Pt has had some classes  () Pt has had little/ not staying for education Exacerbation Prevention & Management Goals  Reassessment        () Pt has had class  () Pt has not had class        () Pt has had all classes  () Pt has had some classes  () Pt has had little/ not staying for education Exacerbation Prevention & Management Goals  Reassessment        () Pt has had class  () Pt has not had class        () Pt has had all classes  () Pt has had most classes  () Pt has had some of the classes  () Pt has had little/ not staying for education Exacerbation Prevention & Management Goals  Discharge        () Pt had class  Date:  See above  () Pt did not have class        () Pt attended all relevant classes and all questions were answered                   PHYSICIAN INTERACTION    MD Initial Assessment Review    (x) Initial  Assessment Reviewed  (x) Treatment Plan and Goals support patient needs/ abilities                  Cosigned and dated below:   PHYSICIAN INTERACTION    MD 30 day and Plan Review:      (x) I have seen the patient in rehab during this 30 day reassessment  (x) I agree with the plan  (x) Continue with progression and instruction  () Continue, but with the following changes:      Cosigned and dated below: Ples Schilder MD 30 day Reassessment Review:    (x) I have seen the patient in rehab during this 30 day reassessment  (x) Continue with the current program  () Continue, but with the following changes:  () Discharge patient      Cosigned and dated below: Ples Schilder MD 30 day Reassessment Review:    (x) I have seen the patient in rehab during this 30 day reassessment  (x) Continue with the current program  () Continue, but with the following changes:  () Discharge patient      Cosigned and dated below: Ples Schilder MD 30 day Reassessment Review:    (x) I have seen the patient in rehab during this 30 day reassessment  (x) Continue with the current program  () Continue, but with the following changes:  () Discharge patient      Cosigned and dated below: Ples Schilder MD 30 day   Discharge Review:    (x) Discharge patient                            Cosigned and dated below:     Cosigned by: Meghana Ruby MD at 2/17/2022 10:59 AM       Revision History    Date/Time User Provider Type Action   2/17/2022 10:59 AM Meghana Ruby MD Physician Cosign   2/15/2022 11:12 AM Sonia Hong RCP Respiratory Therapist Sign

## 2022-03-17 ENCOUNTER — HOSPITAL ENCOUNTER (OUTPATIENT)
Dept: CARDIAC REHAB | Age: 77
Setting detail: THERAPIES SERIES
Discharge: HOME | End: 2022-03-17
Payer: MEDICARE

## 2022-03-17 PROCEDURE — 94626 PHY/QHP OP PULM RHB W/MNTR: CPT

## 2022-03-18 ENCOUNTER — OFFICE VISIT (OUTPATIENT)
Dept: FAMILY MEDICINE CLINIC | Age: 77
End: 2022-03-18
Payer: MEDICARE

## 2022-03-18 VITALS
SYSTOLIC BLOOD PRESSURE: 118 MMHG | HEART RATE: 72 BPM | RESPIRATION RATE: 16 BRPM | WEIGHT: 221.2 LBS | DIASTOLIC BLOOD PRESSURE: 72 MMHG | TEMPERATURE: 97.8 F | BODY MASS INDEX: 30.85 KG/M2

## 2022-03-18 DIAGNOSIS — M54.50 ACUTE RIGHT-SIDED LOW BACK PAIN WITHOUT SCIATICA: Primary | ICD-10-CM

## 2022-03-18 DIAGNOSIS — M46.1 SACROILIAC INFLAMMATION (HCC): ICD-10-CM

## 2022-03-18 PROCEDURE — G8427 DOCREV CUR MEDS BY ELIG CLIN: HCPCS | Performed by: FAMILY MEDICINE

## 2022-03-18 PROCEDURE — G8484 FLU IMMUNIZE NO ADMIN: HCPCS | Performed by: FAMILY MEDICINE

## 2022-03-18 PROCEDURE — 1036F TOBACCO NON-USER: CPT | Performed by: FAMILY MEDICINE

## 2022-03-18 PROCEDURE — 1123F ACP DISCUSS/DSCN MKR DOCD: CPT | Performed by: FAMILY MEDICINE

## 2022-03-18 PROCEDURE — 99213 OFFICE O/P EST LOW 20 MIN: CPT | Performed by: FAMILY MEDICINE

## 2022-03-18 PROCEDURE — G8417 CALC BMI ABV UP PARAM F/U: HCPCS | Performed by: FAMILY MEDICINE

## 2022-03-18 PROCEDURE — 96372 THER/PROPH/DIAG INJ SC/IM: CPT | Performed by: FAMILY MEDICINE

## 2022-03-18 PROCEDURE — 4040F PNEUMOC VAC/ADMIN/RCVD: CPT | Performed by: FAMILY MEDICINE

## 2022-03-18 RX ORDER — METHYLPREDNISOLONE ACETATE 40 MG/ML
160 INJECTION, SUSPENSION INTRA-ARTICULAR; INTRALESIONAL; INTRAMUSCULAR; SOFT TISSUE ONCE
Status: COMPLETED | OUTPATIENT
Start: 2022-03-18 | End: 2022-03-18

## 2022-03-18 RX ORDER — METHYLPREDNISOLONE ACETATE 80 MG/ML
160 INJECTION, SUSPENSION INTRA-ARTICULAR; INTRALESIONAL; INTRAMUSCULAR; SOFT TISSUE ONCE
Status: DISCONTINUED | OUTPATIENT
Start: 2022-03-18 | End: 2022-03-18

## 2022-03-18 RX ADMIN — METHYLPREDNISOLONE ACETATE 160 MG: 40 INJECTION, SUSPENSION INTRA-ARTICULAR; INTRALESIONAL; INTRAMUSCULAR; SOFT TISSUE at 12:01

## 2022-03-18 NOTE — PROGRESS NOTES
Administrations This Visit     methylPREDNISolone acetate (DEPO-MEDROL) injection 160 mg     Admin Date  03/18/2022  12:01 Action  Given Dose  160 mg Route  IntraMUSCular Site  Dorsogluteal Left Administered By  Lesley Face    Ordering Provider: Lucy Lerner MD    NDC: [address]-3    Lot#: [id number]    : [address]    Patient Supplied?: No    Comments: 80mg in dorsogluteal left, 80mg in dorsogluteal right

## 2022-03-19 ASSESSMENT — ENCOUNTER SYMPTOMS
COUGH: 0
CONSTIPATION: 0
SHORTNESS OF BREATH: 0
RHINORRHEA: 0
ABDOMINAL DISTENTION: 0
DIARRHEA: 0
BACK PAIN: 1
EYE PAIN: 0
NAUSEA: 0
ABDOMINAL PAIN: 0
SORE THROAT: 0
SINUS PRESSURE: 0

## 2022-03-19 NOTE — PROGRESS NOTES
[address] [place] [facility] [address]  Dept: [phone]  Dept Fax: [phone]  Loc: [phone]  PROGRESS NOTE      VisitDate: 3/18/2022    Jacbo Hart is a 67 y.o. male who presents today for:     Chief Complaint   Patient presents with    Back Pain         Subjective:  HPI  Patient history of sacroiliac dysfunction comes in with acute onset of right-sided low back pain with some radiation down his leg. No acute injury. His activity has increased as he has been participating and ramping up with pulmonary rehab. He continues on oxygen and is doing very well. Review of Systems   Constitutional: Negative for appetite change and fever. HENT: Negative for congestion, ear pain, postnasal drip, rhinorrhea, sinus pressure and sore throat. Eyes: Negative for pain and visual disturbance. Respiratory: Negative for cough and shortness of breath. Cardiovascular: Negative for chest pain. Gastrointestinal: Negative for abdominal distention, abdominal pain, constipation, diarrhea and nausea. Genitourinary: Negative for dysuria, frequency and urgency. Musculoskeletal: Positive for back pain. Negative for arthralgias. Skin: Negative for rash. Neurological: Negative for dizziness. Past Medical History:   Diagnosis Date    Arthritis     Cancer ([facility] 1990    Spelenectomy, cholycystectomy,partial pancrease, partial stomache, lymphnodes    COPD (chronic obstructive pulmonary disease) (HCC)     Hypertension     Nausea & vomiting     Type II or unspecified type diabetes mellitus without mention of complication, not stated as uncontrolled       Past Surgical History:   Procedure Laterality Date    CHOLECYSTECTOMY      COLONOSCOPY  3/16/12    [address]    CYST REMOVAL      FROM NECK AND BACK    HERNIA REPAIR      OTHER SURGICAL HISTORY  1/25/2013    RIGHT POST.  CERVICAL LAMINOTOMIES AND FORAMINOTOMIES    PANCREAS SURGERY      partial removal     PARATHYROID GLAND SURGERY      PILONIDAL CYST EXCISION      SPLENECTOMY, TOTAL       Family History   Problem Relation Age of Onset    Cancer Father         zollinger shay syndrome    Cancer Son         carcinoid cancer     Social History     Tobacco Use    Smoking status: Former Smoker     Packs/day: 2.00     Years: 20.00     Pack years: 40.00     Types: Cigarettes     Quit date: 1991     Years since quittin.8    Smokeless tobacco: Never Used   Substance Use Topics    Alcohol use: Yes     Alcohol/week: 0.0 standard drinks     Comment: rarely      Current Outpatient Medications   Medication Sig Dispense Refill    tiotropium (SPIRIVA RESPIMAT) 2.5 MCG/ACT AERS inhaler Inhale 2 puffs into the lungs daily 3 each 1    ADVAIR DISKUS 250-50 MCG/DOSE AEPB USE 1 INHALATION TWICE A  each 3    metFORMIN (GLUCOPHAGE) 500 MG tablet Take 1 tablet by mouth 2 times daily (with meals) HOLD 48 HRS PRIOR TO SURGERY 60 tablet 5    albuterol (PROVENTIL) (2.5 MG/3ML) 0.083% nebulizer solution Take 3 mLs by nebulization every 6 hours as needed for Wheezing or Shortness of Breath 120 each 3    BD INSULIN SYRINGE U/F 31G X \" 0.5 ML MISC USE AS DIRECTED UNDER THE SKIN TWICE DAILY      atorvastatin (LIPITOR) 10 MG tablet Take 1 tablet by mouth nightly       doxazosin (CARDURA) 8 MG tablet Take 1 tablet by mouth nightly       OXYGEN Inhale into the lungs 1 L at rest and 3 L with activity      sildenafil (VIAGRA) 100 MG tablet Take 100 mg by mouth as needed for Erectile Dysfunction       TRAVATAN Z 0.004 % SOLN ophthalmic solution Place 1 drop into both eyes nightly.  Insulin Lispro Prot & Lispro (HUMALOG MIX 75/25 SC) Inject 30 Units into the skin 2 times daily       quinapril (ACCUPRIL) 20 MG tablet Take 20 mg by mouth 2 times daily.  esomeprazole (NEXIUM) 40 MG capsule Take 40 mg by mouth 2 times daily.         albuterol sulfate (PROAIR RESPICLICK) 781 (90 Base) MCG/ACT aerosol powder inhalation Inhale 2 puffs into the lungs every 6 hours as needed for Wheezing or Shortness of Breath (Patient not taking: Reported on 3/18/2022) 3 Inhaler 3     No current facility-administered medications for this visit. Allergies   Allergen Reactions    Propofol Anaphylaxis    Bee Venom      Health Maintenance   Topic Date Due    Hepatitis C screen  Never done    Meningococcal (ACWY) vaccine (1 - Risk start 2-23 months series) Never done    Hib vaccine (1 of 1 - Risk 1-dose series) Never done    Meningococcal B vaccine (1 of 4 - Increased Risk Bexsero 2-dose series) Never done    Depression Screen  Never done    DTaP/Tdap/Td vaccine (1 - Tdap) Never done    Shingles Vaccine (2 of 3) 05/26/2016    Lipid screen  02/11/2022    Annual Wellness Visit (AWV)  03/18/2022    Potassium monitoring  12/11/2022    Creatinine monitoring  12/11/2022    Flu vaccine  Completed    Pneumococcal 65+ yrs at Risk Vaccine  Completed    COVID-19 Vaccine  Completed    Hepatitis A vaccine  Aged Out         Objective:     Physical Exam  Constitutional:       General: He is not in acute distress. Appearance: He is well-developed. He is not diaphoretic. HENT:      Head: Normocephalic and atraumatic. Right Ear: External ear normal.      Left Ear: External ear normal.   Eyes:      Conjunctiva/sclera: Conjunctivae normal.   Neck:      Vascular: No JVD. Cardiovascular:      Rate and Rhythm: Normal rate and regular rhythm. Heart sounds: Normal heart sounds. Pulmonary:      Effort: Pulmonary effort is normal.      Breath sounds: Normal breath sounds. No wheezing or rales. Musculoskeletal:         General: Tenderness present. Comments: Tenderness in the right lower lumbar spine, muscle spasm is palpable   Skin:     General: Skin is warm and dry. Coloration: Skin is not pale. Neurological:      Mental Status: He is alert and oriented to person, place, and time. /72 (Site: Right Upper Arm, Position: Sitting)   Pulse 72   Temp 97.8 °F (36.6 °C) (Oral)   Resp 16   Wt 221 lb 3.2 oz (100.3 kg)   BMI 30.85 kg/m²       Impression/Plan:  1. Acute right-sided low back pain without sciatica    2. Sacroiliac inflammation (HCC)      Requested Prescriptions      No prescriptions requested or ordered in this encounter     No orders of the defined types were placed in this encounter. Methylprednisolone 160 IM counseled on activity stretching and Tylenol for pain    Patient giveneducational materials - see patient instructions. Discussed use, benefit, and side effects of prescribed medications. All patient questions answered. Pt voiced understanding. Reviewed health maintenance. Patient agreedwith treatment plan. Follow up as directed. **This report has been created using voice recognition software. It may contain minor errorswhich are inherent in voice recognition technology. **       Electronically signed by Sonia Ambriz MD on 3/19/2022 at 8:39 AM

## 2022-03-22 ENCOUNTER — HOSPITAL ENCOUNTER (OUTPATIENT)
Dept: CARDIAC REHAB | Age: 77
Setting detail: THERAPIES SERIES
Discharge: HOME | End: 2022-03-22
Payer: MEDICARE

## 2022-03-22 PROCEDURE — 94626 PHY/QHP OP PULM RHB W/MNTR: CPT

## 2022-03-24 ENCOUNTER — HOSPITAL ENCOUNTER (OUTPATIENT)
Dept: CARDIAC REHAB | Age: 77
Setting detail: THERAPIES SERIES
Discharge: HOME | End: 2022-03-24
Payer: MEDICARE

## 2022-03-24 PROCEDURE — 94626 PHY/QHP OP PULM RHB W/MNTR: CPT

## 2022-03-29 ENCOUNTER — HOSPITAL ENCOUNTER (OUTPATIENT)
Dept: CARDIAC REHAB | Age: 77
Setting detail: THERAPIES SERIES
Discharge: HOME | End: 2022-03-29
Payer: MEDICARE

## 2022-03-29 PROCEDURE — 94626 PHY/QHP OP PULM RHB W/MNTR: CPT

## 2022-03-31 ENCOUNTER — HOSPITAL ENCOUNTER (OUTPATIENT)
Dept: CARDIAC REHAB | Age: 77
Setting detail: THERAPIES SERIES
Discharge: HOME | End: 2022-03-31
Payer: MEDICARE

## 2022-03-31 PROCEDURE — 94626 PHY/QHP OP PULM RHB W/MNTR: CPT

## 2022-04-05 ENCOUNTER — HOSPITAL ENCOUNTER (OUTPATIENT)
Dept: CARDIAC REHAB | Age: 77
Setting detail: THERAPIES SERIES
Discharge: HOME | End: 2022-04-05
Payer: MEDICARE

## 2022-04-05 PROCEDURE — 94625 PHY/QHP OP PULM RHB W/O MNTR: CPT

## 2022-04-05 PROCEDURE — 94626 PHY/QHP OP PULM RHB W/MNTR: CPT

## 2022-04-05 NOTE — PROGRESS NOTES
PULMONARY REHABILITATION / RTR PROGRAM  EDUCATION SESSION      Sandip Dumont  Session: _____      [address] [place]  and I went over many tips to help him/her be able to do more of the activities they want and need to do. I stressed the importance of \"Plan, Prioritize, Position and Pace\". Also reminded the pt to remember to do  their pursed lip and diaphragmatic breathing as needed to help ease shortness of breath. TRAVEL - We discussed things they need to remember when traveling with chronic lung disease and/or Oxygen. SLEEP - We discussed tips for sleeping better, signs of a possible sleep disorder and the use of CPAP/BiPAP to treat some sleep disorders. HOME O2 USE -  The pts current Home O2 is 1L NC at rest and 3L NC with exertion. I went over Oxygen safety and the Do's and Don'ts of Home O2. Pt is interested in a portable concentrator. I spoke with Feliciano Law for pt and pt was tested for this in February of 2022 and pt did not qualify. Pt had no questions at this time.      Time Spent: 33 minutes

## 2022-04-05 NOTE — PROGRESS NOTES
Pt asking me today about a portable O2 concentrator(POC). Pt really wanting to use one if he can/qualifies. According to pt and his Homecare, which is through [address] [place] [address], the pt was tested on a POC in February and the highest they go up is 6LPM with pulse flow and the pt did not tolerate at that time. Pt really wants to possibly be tested again to see if getting any better since starting rehab and if he would qualify now for a POC. Per pts SpO2's during rehab they are maybe a little better on the 3L NC. I told pt we would try to exercise him starting on 2L NC a couple times and if SpO2 does not tolerated we will go right back up to the 3L NC. If pt does tolerate the 2L NC he can decide if wants to get retested at his home care company for the [facility] [address] because he may have to pay for the testing. Pt understood. I put note on pts chart to start pt exercising on 2L NC on his next couple rehab days and to go back to 3L NC if needed per SpO2.

## 2022-04-07 ENCOUNTER — HOSPITAL ENCOUNTER (OUTPATIENT)
Dept: CARDIAC REHAB | Age: 77
Setting detail: THERAPIES SERIES
Discharge: HOME | End: 2022-04-07
Payer: MEDICARE

## 2022-04-07 PROCEDURE — 94626 PHY/QHP OP PULM RHB W/MNTR: CPT

## 2022-04-12 ENCOUNTER — HOSPITAL ENCOUNTER (OUTPATIENT)
Dept: CARDIAC REHAB | Age: 77
Setting detail: THERAPIES SERIES
End: 2022-04-12
Payer: MEDICARE

## 2022-04-12 NOTE — PROGRESS NOTES
[facility] Facility-Based Therapy for COPD   INDIVIDUAL TREATMENT PLAN (ITP)     Name: Katalina Dias   :  1945  Acct Number: [[id number]]   AGE: 67 y.o. Diagnosis:  Severe COPD, which is GOLD Stage 3                                               PFT:  Post Bronchodilator FEV1/FVC: 65  FEV1: 37    Patient Goals:  (x) Breathe better  (x) Increase my endurance (x) Have more energy  (x) Rely less on others  (x) Ease ADLs  (x) Understand and use meds correctly  (x) Control cough  (x) Make fewer visits to hospital  () Quit smoking  (x) Feel less anxious / have more confidence    Glossary:  MN=Pulmonary Rehab  PF=Physical Fitness TM=Treadmill  AD=Schwinn Airdyne  UBE=UpperBody Ergometer  RT=Resistance Training  PLB=Pursed Lip Breathing      COVID -19 Screen  Do you have any of the following symptoms:  [] Fever [] Cough [] SOB [] Muscle/Body Ache [] Loss of taste/smell [x] None    Have you traveled outside of the [place]? [] Yes      [x] No    Have you been around anyone who has tested Positive for COVID 19?  [] Yes      [x] No    The following Education has been completed with patient. [x] Wait in the lobby until we call you back to rehab. [x] You must wear a mask while in the medical center and in pulmonary rehab unless exercising. Please bring your own. [x] Bring your own bottle of water with you. [x] You can not bring anyone with you in to the rehab clinic at this time. They are welcome to sit in the lobby or wait in the car.       INDIVIDUAL TREATMENT PLAN    INITIAL ASSESSMENT    Day #1 INDIVIDUAL TREATMENT PLAN    PLAN      Day #2-30 INDIVIDUAL TREATMENT PLAN    RE ASSESSMENT    Day #31-60 INDIVIDUAL TREATMENT PLAN    RE ASSESSMENT    Day #61-90 INDIVIDUAL TREATMENT PLAN    RE ASSESSMENT    Day # INDIVIDUAL TREATMENT PLAN    DISCHARGE      Last Day        Date: 2/15/2022     Date: 3/15/2022    Martinez Suresh is not here today for reassessment    Date: 22    Martinez Suresh is not here today for reassessment   Date:    Date:    Date:    EXERCISE   INITIAL ASSESSMENT      Prescribed Oxygen Use  Activity: 3 L/min  (x) Continuous  () Breath Actuated      Oxygen Titration  (x) Assess for desaturation            Current Home Exercise:  None   EXERCISE  PLAN        Current Oxygen Use  Activity:  3L/min  (x) Continuous  () Breath Actuated      Oxygen Titration  (x) Maintaining >87% Spo2  () Not maintaining -  L/min needed on       Current Home Exercise:  unknown   EXERCISE  RE ASSESSMENT      Current Oxygen Use  Activity: 2 L/min  (x) Continuous  () Breath Actuated      Oxygen Titration  (x) Maintaining >87% Spo2  () Not maintaining -  L/min needed on       Current Home Exercise:  unkn EXERCISE  RE ASSESSMENT      Current Oxygen Use  Activity:  L/min  () Continuous  () Breath Actuated      Oxygen Titration  () Maintaining >87% Spo2  () Not maintaining -  L/min needed on       Current Home Exercise:  Frequency:  x/wk  Intensity:  /10  Duration:  min  Mode: EXERCISE  RE ASSESSMENT      Current Oxygen Use  Activity:  L/min  () Continuous  () Breath Actuated      Oxygen Titration  () Maintaining >87% Spo2  () Not maintaining -  L/min needed on       Current Home Exercise:  Frequency:  x/wk  Intensity:  /10  Duration:  min  Mode:   EXERCISE  DISCHARGE        Final Oxygen Use  Activity:  L/min  () Continuous  () Breath Actuated      Oxygen Titration  () Maintaining >87% Spo2  () Not maintaining -  L/min needed on       Current Home Exercise:  Frequency:  x/wk  Intensity:  /10  Duration:  min  Mode:       6 Minute Walk Test (6MWT):  O2 used: 3LPM nasal cannula   960 ft walked = 2.3 METs  SpO2: 88-98%  HR:    RPD: 4  RPE: 6  Number of Breaks: 2   Avg time for break:  both breaks were approx 7 secs  Assist device used: none         6 Minute Walk Test (6MWT):  O2 used:   ft walked =  METs  SpO2:  %  HR:    RPD:   RPE:  Number of Breaks:    Avg time for break:  secs  Assist device used: scale    Time:  30-45 total minutes up to 55 minutes max    Type:  Aerobic (TM, AD, UBE, NuStep), 6 ST, RT 1-10# 8-15 reps    Progression:  Increase 30s - 2 min/mode for Aerobic activity up to 35 minutes, and increase Intensity 2-5% each week if RPE <5, RPD <5, SpO2 >87% and pt is within On license of [facility] above. Home Program          (x) Given to patient with current levels, recommendation and guidelines.                                        Initial Levels:  TM:1.7mph,6min  AD:0.9level,6min  NuStep:48W, 6 min  UBE:0.4level, 5 min  RT 3#, 8-15 reps   Current Levels:  TM: 1.8 mph, 10 min  AD:  40watts, 8 min  UBE:  22watts, 5 min  RT 3 #, 8-15 reps   Current Levels:  TM:  1.8 mph, 12 min  AD: 40 Wilde, 12 min    UBE:24Watts, 5 min  RT 3 #, 8-15 reps Current Levels:  TM:  mph,  min  AD:  level,  min  NuStep:  W,  min  UBE:  level, 5 min  RT  #, 8-15 reps Current Levels:  TM:  mph,  min  AD:  level,  min  NuStep:  W,  min  UBE:  level, 5 min  RT  #, 8-15 reps   Final Levels:  TM:  mph,  min  AD:  level,  min  NuStep:  W,  min  UBE:  level, 5 min  RT  #, 8-15 reps     Physical Limitations  Initial Assessment    () Weakness  () Inflexible  () Poor posture  () Gait abnormality  () Balance  () Orthopedic Issues   Physical Limitation  Plan      (x) Strengthen through squats and RT  (x) Appropriate stretches shown  (x)Verbal cues and reminders for posture and gait given  (x) Proper modalities chosen for ortho issues  (x) Give Home activity / stretches/ Warmup/ [place] info   Physical Limitations Reassessment      (x) Pt progressing  () Pt not progressing Physical Limitations Reassessment      () Pt progressing  () Pt not progressing Physical Limitations Reassessment      () Pt progressing  () Pt not progressing     Physical Limitations  Discharge      () Issues Addressed  () PT/OT suggested       Exercise Goals   Initial Assessment:    (x) Start to, and commit to exercising regularly     (x) Learn about home activity recommendations        (x) Increase PF shown by increasing 6MWD   Exercise Goals   Plan      -Initiate TN 2x/week  -Encourage home exercise    -Attend Home Activity EDUCATION class      -Slowly, safely, progress in TN    Exercise Goals   Reassessment      (x) Pt is coming regularly  () Pt is sporadic    () Pt has had class  (x) Pt has not had class        (x) Pt is progressing  () Pt is not progressing Exercise Goals   Reassessment      () Pt is coming regularly  () Pt is sporadic    () Pt has had class  () Pt has not had class        () Pt is progressing  () Pt is not progressing Exercise Goals   Reassessment      () Pt is coming regularly  () Pt is sporadic    () Pt has had class  () Pt has not had class        () Pt is progressing  () Pt is not progressing   Exercise Goals Discharge      () GOAL Met?  () GOAL not met -      () Pt had EDUCATION class  Date:   () Pt did not have class    () GOAL Met  See 6MWD above  () GOAL not Met:     Exercise Intervention Initial Assessment      () Transportation needed        (x) EDUCATION needed          (x) Motivation needed   Exercise Intervention/ Education   Plan      () [facility] set up        (x) EDUCATION:  Home activity class to be given        (x) Positive encouragement, support and motivation to be given   Exercise Intervention/ Education Reassessment      (x) Pt is attending  () Pt is not attending      () Pt has had class  (x) Pt has not had class      (x) Pt is progressing  () Pt not progressing Exercise Intervention/ Education Reassessment      () Pt is attending  () Pt is not attending      () Pt has had class  () Pt has not had class      () Pt is progressing  () Pt not progressing Exercise Intervention/ Education Reassessment      () Pt is attending  () Pt is not attending      () Pt has had class  () Pt has not had class      () Pt is progressing  () Pt not progressing Exercise Intervention/ Edcuation Discharge      () Pt attended most  () Pt cancelled a lot    () Pt has had class  Date: See above  () Pt did not have class    () Pt progressed and did well  () Pt had difficulty-               NUTRITION   INITIAL ASSESSMENT      Height:  511   Weight: 228.8 lbs  BMI: 31.9    30 day goal: 227 Lbs (2-4lbs)    (x) Overweight  () Underweight  () Normal  () Teeth issues  () GI issues  (x) Nutrition knowledge needed  (x) DM   NUTRITION   PLAN        Current Weight:  228.8lbs      Goal achieved?: no  Next 30 day goal: 227 Lbs   NUTRITION  RE ASSESSMENT      Current Weight:  228.8lbs      Goal achieved?:  Next 30 day goal: 227 Lbs NUTRITION  RE ASSESSMENT      Current Weight:  lbs      Goal achieved?:  Next 30 day goal: Lbs NUTRITION  RE ASSESSMENT      Current Weight:  lbs      Goal achieved?:  Next 30 day goal: Lbs   NUTRITION DISCHARGE        Current Weight:  lbs  BMI:    Goal achieved?:     Nutrition Goals  Initial Assessment    (x) Pt is DM.   Increase nutrition knowledge and glucose control through diet and exercise      (x) Learn weight strategies to lose weight        (x) Learn about general nutrition          -Achievable weight goal for the end of the program:  217 Lbs (2-4lbs per month recommended)   Nutrition Goals   Plan      (x) DM:  Attend nutrition class and learn about quality carbohydrates and exercises role in DM    (x) Attend nutrition class          (x) Attend nutrition class          (x) Initiate exercise and give pt hints and tips for weight and will have class for information   Nutrition Goals  Reassessment      () DM: Pt has had class  (x) DM: Pt has not had class           () Pt has had class  (x) Pt has not had class       () Pt has had class  (x) Pt has not had class       () Pt progressing  (x) Pt not progressing     Nutrition Goals  Reassessment      () DM: Pt has had class  () DM: Pt has not had class           () Pt has had class  () Pt has not had class       () Pt has had class  () Pt has not had class       () Pt progressing  () Pt not progressing     Nutrition Goals  Reassessment      () DM: Pt has had class  () DM: Pt has not had class           () Pt has had class  () Pt has not had class       () Pt has had class  () Pt has not had class       () Pt progressing  () Pt not progressing     Nutrition Goals  Discharge      () Pt had nutrition class  Date:  () Pt has not had class        () Pt has had class  Date: See above  () Pt has not had class    () Pt has class  Date: See above  () Pt has not had class      () Final weight goal achieved  () Pt did not reach desired weight goal - (readdressed nutrition and exercise strategies for future goal attainment)        Nutrition Intervention/ Education   Initial Assessment    (x) Pt needs Nutrition education class        () Pt states does not need class       Nutrition Intervention/ Education  Plan      (x) Pt will have Nutrition class          (x) Encourage pt to continue to learn and incorporate self knowledge in to diet choices   Nutrition Intervention/ Education  Reassessment      () Pt has had class  (x) Pt has not had class      () Pt had questions  () Pt denies having questions Nutrition Intervention/ Education  Reassessment      () Pt has had class  () Pt has not had class      () Pt had questions  () Pt denies having questions Nutrition Intervention/ Education  Reassessment      () Pt has had class  () Pt has not had class      () Pt had questions  () Pt denies having questions Nutrition Intervention/ Education   Discharge      () Pt has had class  Date: See above  () Pt has not had class - Reason:    () Pt had questions that were answered   () Pt denies having questions             PSYCHO SOCIAL INITIAL ASSESSMENT      Depression:  () Self Reported  () In History  () S/Sx noted  (x) Denies  () On Medication  () Follows physician      (x) Give PHQ-9 Depression screening tool                  Dyspnea:  (x) Give pt UCSD SOB survey      (x) Pt gets SOB during activity and ADLs.           (x) Pt has support from home for the program        () Pt does not have support for the program   PSYCHO SOCIAL  PLAN      (x) Attend Stress/ Depression/ Anxiety Class                Pre Rehab PHQ-9   Score: 4  1-4 Normal  5-9 Mild  10-14 Mod  15-19 Mod-Sev  >19 Severe        Pre Rehab UCSD SOB Score: 42      (x) Attend classes and attend rehab to increase strength and endurance      -Attend Psychosocial class          () Speak with the patient/ family about ability to commit to the program with undue stress/ anxiety   PSYCHO SOCIAL  RE ASSESSMENT    () Pt scored >10 on PHQ-9.   Spoke with pt privately about issues and *             () Pt recommended to contact physician for assistance                See below for ADLs        (x) Pt has had class 3/8/22  () Pt has not had class        Re assessment of support:  Good, pt has been attending PSYCHO SOCIAL  RE ASSESSMENT    () Pt is coping well  () Pt needs more assistance-              () Pt recommended to contact physician for assistance                See below          () Pt has had class  () Pt has not had           Re assessment of support:  Good, pt has been attending PSYCHO SOCIAL  RE ASSESSMENT    () Pt is coping well  () Pt needs more assistance-              () Pt recommended to contact physician for assistance                See below          () Pt has had class  () Pt has not had class        Re assessment of support:  Good, pt has been attending   PSYCHO SOCIAL DISCHARGE      (x) Pt attended class     Date:              Post Rehab PHQ-9   Depression Score:                Post Rehab UCSD SOB  Score:      (x) Pt is less SOB with ADLs            () Pt had class  Date: See above  () Pt did not have class        () Pt completed program  () Pt had to stop         Psychosocial Goals   Initial Assessment    (x) Maintain/ Decrease Depression Levels      (x) Maintain/ Decrease Anxiety Levels        (x) Learn about Emotional Health          (x) Increase QoL   (CAT tool)          (x) Give the CAT tool for HR QoL      Pre Rehab  CAT score:  9/ 40       Psychosocial Goals  Plan      (x) Attend Stress/ Anxiety/ Dyspnea - Panic control class      (x) Attend Stress/ Anxiety/ Dyspnea - Panic control class      (x) Attend Stress/ Anxiety/ Dyspnea - Panic control class      (x) Initiate ME, get stronger, more endurance and more confidence              30 day CAT score:  NA / 40     Psychosocial Goals  Reassessment      (x) Pt has had class  () Pt has not had class      (x) Pt has had class  () Pt has not had class      (x) Pt has had class  () Pt has not had class      (x) Pt is progressing  () Pt is not progressing              60 day CAT score:  na/ 40 Psychosocial Goals  Reassessment      () Pt has had class  () Pt has not had class      () Pt has had class  () Pt has not had class      () Pt has had class  () Pt has not had class      () Pt is progressing  () Pt is not progressing              90 day CAT score:  / 40 Psychosocial Goals  Reassessment      () Pt has had class  () Pt has not had class      () Pt has had class  () Pt has not had class      () Pt has had class  () Pt has not had class      () Pt is progressing  () Pt is not progressing              120 day CAT score:  / 40   Psychosocial Goals   Discharge      (x) Pt had class            (x) Pt had class            (x) Pt had class  Date: See above  () Pt did not have class      Post rehab CAT below                  Post Rehab   CAT score:  / 40              OUTCOMES    PHQ-9:  Did pt drop a severity level or maintain in the minimal range?  () Y  () N    UCSD SOB  Did pt decrease their number by 5 or more?  () Y  () N    CAT scores:  Did pt drop by 2 or more points from Pre to Post?  () Y  () N        Psychosocial Intervention/ Education   Initial Assessment    () Pt is being treated for depression/ anxiety        (x) Pt would benefit from MEs Psychosocial Issues Class (Stress, Depression, Anxiety, and Intimacy Psychosocial  Intervention/ Education  Plan      (x) Pt to contact physician if any severe changes occur in mood        (x) Pt will attend MTs class   Psychosocial Intervention/ Education Reassessment      (x) Pt is coping well  () Pt is not coping well         (x) Pt has had class  () Pt has not had class Psychosocial Intervention/ Education Reassessment      () Pt is coping well  () Pt is not coping well         () Pt has had class  () Pt has not had class Psychosocial Intervention/ Education Reassessment      () Pt is coping well  () Pt is not coping well         () Pt has had class  () Pt has not had class   Psychosocial Intervention/ Education Discharge      () Pt did not need any changes   () Pt needed changes to treatment      (x) Pt had class  Date: See above  () Pt did not have class         Pain   Initial Assessment    (x) N/A  Location:   Duration:   Intensity:  /10  Type:    Pain   Plan      (x) N/A    (x) Pts pain is under control  () Pt encouraged to contact physician for pain control   Pain   Reassessment      (x) N/A    () Pts pain is under control  () Pt encouraged to contact physician for pain control Pain   Reassessment      () N/A    () Pts pain is under control  () Pt encouraged to contact physician for pain control Pain   Reassessment      () N/A    () Pts pain is under control  () Pt encouraged to contact physician for pain control Pain   Discharge      () N/A    () Pts pain is under control  () Pt encouraged to contact physician for pain control           OXYGEN   INITIAL ASSESSMENT    RESTING:  () RA  (x) O2: 1 L/min  (x) Continuous  () Breath Actuated  97% SpO2 / 16 bpm    Prescribed Oxygen Use:  1 L/min at Rest and 3L/min with exertion    DME Company for O2:  [facility]     OXYGEN   PLAN      RESTING:  (x) Monitor needs, administer supplemental oxygen if SpO2 <88% OXYGEN   RE ASSESSMENT    RESTING:  (x) Pt SpO2 >87%  () Pt needs higher flow  () Pt needs less flow OXYGEN   RE ASSESSMENT    RESTING:  (x) Pt SpO2 >87%  () Pt needs higher flow  () Pt needs less flow OXYGEN   RE ASSESSMENT    RESTING:  (x) Pt SpO2 >87%  () Pt needs higher flow  () Pt needs less flow OXYGEN   DISCHARGE      RESTING:  () Pt SpO2 remained >87% on * L/min at rest    () Pts doctor and company contacted for change in Rx   Oxygen Goals   Initial Assessment    (x) Wean, Titrate down, or get off O2 if possible   Oxygen Goals   Plan      (x) Closely monitor SpO2 and HR using pulse oximetry for saturation and HR response, and titrate if appropriate   Oxygen Goals  Reassessment    () RA    On exertion:  (x) Pt maintaining FiO2  () Pt tolerating lower O2 needs  () Pt needing more O2   Oxygen Goals  Reassessment    () RA    On exertion:  () Pt maintaining FiO2  () Pt tolerating lower O2 needs  () Pt needing more O2 Oxygen Goals  Reassessment    () RA    On exertion:  () Pt maintaining FiO2  () Pt tolerating lower O2 needs  () Pt needing more O2     Oxygen Goals   Discharge    () RA    With Exertion:  () Pt maintained FiO2  () Pt was found to need less O2 - notification sent to physician  () Pt was found to need more O2, notification sent to physician     Oxygen Intervention/ Education  Initial Assessment    (x) Learn / Review about O2 use, safety and travel      () Pt does not wear or have home oxygen    Oxygen Intervention/ Education  Plan      (x) Attend O2 Use, safety and travel class        (x) Assess for SpO2 with each exercise   Oxygen Intervention/ Education  Reassessment      (x) Pt has had class  () Pt has not had class      (x) Pt is >87%  () Pt has been found to desaturate - physician notified Oxygen Intervention/ Education  Reassessment      () Pt has had class  () Pt has not had class      () Pt is >87%  () Pt has been found to desaturate - physician notified Oxygen Intervention/ Education  Reassessment      () Pt has had class  () Pt has not had class      () Pt is >87%  () Pt has been found to desaturate - physician notified   Oxygen Intervention/ Education  Discharge      () Pt had class  Date:   () Pt did not have class      () Pt did well  () Pt needed to be put on oxygen           TOBACCO USE  INITIAL ASSESSMENT    (x) Pt currently not smoking    () Pt currently smoking        () Pt needs Tobacco Cessation counseling          Smoked 2 ppd for 24 years  Pt quit in 1990.    TOBACCO USE  PLAN      (x) N/A      Does pt want to quit:   Does pt have a quit date:    () Tobacco Cessation counseling Education if applicable        () Encouraged to contact physician for tools/ nicotine replacement etc.   TOBACCO USE  RE ASSESSMENT    () N/A      Any change in Tobacco use status (x) N  ()Y      () Pt attended counseling education session            () Pt has contacted physician TOBACCO USE  RE ASSESSMENT    () N/A      Any change in Tobacco use status () N  ()Y      () Pt attended counseling education session            () Pt has contacted physician TOBACCO USE  RE ASSESSMENT    () N/A      Any change in Tobacco use status () N  ()Y      () Pt attended counseling education session            () Pt has contacted physician TOBACCO USE  DISCHARGE            Current Tobacco Use Status:         () Pt attended TC counseling education session  Date:           () Pt contacted physician        NRT product/ medication  :     Tobacco Use Goal  Initial Assessment      (x) Complete Cessation or Maintain Cessation of tobacco products   Tobacco Use Goal Intervention and Education Plan    (x Encourage pt to fight the urge, call quit line, use techniques learned from friends/ class    () Attend Tobacco Cessation class if needed   Tobacco Use  Reassessment          x() Pt remains tobacco free            () Pt has had TC counseling session        () Pt still smoking Tobacco Use  Reassessment          () Pt remains tobacco free            () Pt has had TC counseling session        () Pt still smoking Tobacco Use  Reassessment          () Pt remains tobacco free            () Pt has had TC counseling session        () Pt still smoking Tobacco Use  Discharge          () Pt remains tobacco free            () Pt had TC counseling  Date:  See above        () Pt still smoking - gave resources for quitting             MEDICATIONS  (Oral & Inhaled)  INITIAL ASSESSMENT    Pt reports taking his meds properly 100% of the time      (x) Pt is on inhaled medications   MEDICATIONS  PLAN        -Review Rxs purpose, schedule, side effects and importance of compliance during Medication class. Also address Cpap, Vents.        MEDICATIONS  RE ASSESSMENT      Pt reports taking their meds properly  % of the time        (x) Pt has had class  () Pt has not had class MEDICATIONS  RE ASSESSMENT      Pt reports taking their meds properly  % of the time        () Pt has had class  () Pt has not had class MEDICATIONS  RE ASSESSMENT      Pt reports taking their meds properly  % of the time        () Pt has had class  () Pt has not had class MEDICATIONS  DISCHARGE        Pt reports taking their meds properly  % of the time        ()Pt had med class  Date:  () Pt has not had class       Pt has:  (x) Metered Dose Inhaler  (x) Dry Powder Inhaler  (x) Nebulizer   -Review correct use, timing, technique and cleaning of equipment during Medication class (x) Pt has had class  () Pt has not had class () Pt has had class  () Pt has not had class () Pt has had class  () Pt has not had class () Pt had class  Date: See above  () Pt has not had class   Medication Goals  Initial Assessment        (x) Take meds properly 100% of the time    (x) Learn about / Review Rxs, and devices Medication Goals  Intervention & Education  Plan      -Follow Rx instructions and ask if questions    -Attend Medication Education class   Medication Goals  Re assessment          () Readdressed questions on any medications    (x) Pt has had class  () Pt has not had class Medication Goals  Re assessment          () Readdressed questions on any medications    () Pt has had class  () Pt has not had class Medication Goals  Re assessment          () Readdressed questions on any medications    () Pt has had class  () Pt has not had class Medication Goals  Discharge          % proper med usage see above      () Pt had class  Date: See above  () Pt has not had class             ADL  INITIAL ASSESSMENT      (x) Impaired ADL ability  () Need for Assist Devices  (x) Generalized weakness, low functional capacity  (x) Stairs in house               ADL  PLAN        -Initiate TX, RT, and chair squats  -Breathing retraining, PLB, and pacing      -Encourage home activity               ADL  RE ASSESSMENT      (x) Pt is progressing  () Pt is not progressing        () Pt has initiated home activity  () Pt has not yet initiated  home activity-      (x) ADLs getting easier  () ADLs not getting easier   ADL  RE ASSESSMENT      () Pt is progressing  () Pt is not progressing        () Pt has initiated home activity  () Pt has not yet initiated  home activity-      () ADLs getting easier  () ADLs not getting easier ADL  RE ASSESSMENT      () Pt is progressing  () Pt is not progressing        () Pt has initiated home activity  () Pt has not yet initiated  home activity-      () ADLs getting easier  () ADLs not getting easier ADL  DISCHARGE        () Pt showed strength and endurance gains  () Pt did not progress      () Pt doing recommended home activity  () Pt not doing home activitiy         ADL Goals   Initial Assessment      (x) Decrease SOB with ADLs              (x) Decreased SOB overall      ADL Goals Intervention/ Education Plan      -Initiate TX, RT and chair squats and increase pts strength and endurance        -Pacing, Breathing retraining       ADL Goals Reassessment        (x) ADLs getting easier  () ADLs not getting easier          (x) Pt states activities are getting easier/ able to go longer/ not get as SOB   ADL Goals Reassessment        () ADLs getting easier  () ADLs not getting easier          () Pt states activities are getting easier/ able to go longer/ not get as SOB ADL Goals Reassessment        () ADLs getting easier  () ADLs not getting easier          () Pt states activities are getting easier/ able to go longer/ not get as SOB   ADL Goals   Discharge        Goal achieved?  () Yes  () No            Goal achieved?  () Yes  () No          Outcomes:  See Health and Functioning from the CAT tool and Strength and Endurance from 6 MWD above             EXACERBAT'N PREVENTION & MANAGEMENT  INITIAL ASSESSMENT      Pt reports;  () 0 respiratory infections  (x) 1   () >2  (x) Hospitalized in last 12 months   EXACERBAT'N PREVENTION & MANAGEMENT  PLAN        Address via Disease Self Management and Exacerbation prevention class:    -Hydration for mucus    -Hand Hygiene          -Evaluation of Sputum    -When to call MD  -S/Sx to report  -Decrease exposure to irritants/ exacerbators    -Cleaning of respiratory equipment   EXACERBAT'N PREVENTION & MANAGEMENT  RE ASSESSMENT      (x) Pt has had class  () Pt has not had class        (x) Improved hydration    (x) Correct hand washing techs and alcohol gel usage    (x) Sputum assessment    (x) Ability to recognize exacerbation and when to call MD        (x) Cleaning of respiratory equipment EXACERBAT'N PREVENTION & MANAGEMENT  RE ASSESSMENT      () Pt has had class  () Pt has not had class        () Improved hydration    () Correct hand washing techs and alcohol gel usage    () Sputum assessment    () Ability to recognize exacerbation and when to call MD        () Cleaning of respiratory equipment EXACERBAT'N PREVENTION & MANAGEMENT  RE ASSESSMENT      () Pt has had class  () Pt has not had class        () Improved hydration    () Correct hand washing techs and alcohol gel usage    () Sputum assessment    () Ability to recognize exacerbation and when to call MD        () Cleaning of respiratory equipment EXACERBAT'N PREVENTION & MANAGEMENT  DISCHARGE        () Pt has had class  Date:  () Pt did not have class              () Addressed questions and pt feels comfortable with hydration, hand washing, sputum assessment, exacerbation knowledge, and cleaning of equipment   Exacerbation Prevention & Management Goals  Initial Assessment      (x) Learn ways to stay healthy and not get admitted          (x) Increase knowledge of Lungs, Breathing, Exercise, Nutrition, Mood and controlling anxiety, and stopping the Dyspnea Cycle by attending classes   Exacerbation Prevention & Management Goals Intervention/ Education  Plan    -Attend class and demonstrate disease self management strategies      -Attend all appropriate classes                   Exacerbation Prevention & Management Goals  Reassessment        (x) Pt has had class  () Pt has not had class        () Pt has had all classes  (x) Pt has had some classes  () Pt has had little/ not staying for education Exacerbation Prevention & Management Goals  Reassessment        () Pt has had class  () Pt has not had class        () Pt has had all classes  () Pt has had some classes  () Pt has had little/ not staying for education Exacerbation Prevention & Management Goals  Reassessment        () Pt has had class  () Pt has not had class        () Pt has had all classes  () Pt has had most classes  () Pt has had some of the classes  () Pt has had little/ not staying for education Exacerbation Prevention & Management Goals  Discharge        () Pt had class  Date:  See above  () Pt did not have class        () Pt attended all relevant classes and all questions were answered                   PHYSICIAN INTERACTION    MD Initial Assessment Review    (x) Initial  Assessment Reviewed  (x) Treatment Plan and Goals support patient needs/ abilities                  Cosigned and dated below:   PHYSICIAN INTERACTION    MD 30 day and Plan Review:      (x) I have seen the patient in rehab during this 30 day reassessment  (x) I agree with the plan  (x) Continue with progression and instruction  () Continue, but with the following changes:      Cosigned and dated below: Vinod Jaime MD 30 day Reassessment Review:    (x) I have seen the patient in rehab during this 30 day reassessment  (x) Continue with the current program  () Continue, but with the following changes:  () Discharge patient      Cosigned and dated below: Vinod Jaime MD 30 day Reassessment Review:    (x) I have seen the patient in rehab during this 30 day reassessment  (x) Continue with the current program  () Continue, but with the following changes:  () Discharge patient      Cosigned and dated below: Vinod Jaime MD 30 day Reassessment Review:    (x) I have seen the patient in rehab during this 30 day reassessment  (x) Continue with the current program  () Continue, but with the following changes:  () Discharge patient      Cosigned and dated below: Vinod Jaime MD 30 day   Discharge Review:    (x) Discharge patient                            Cosigned and dated below:     Cosigned by: Jonn Parikh MD at 2/17/2022 10:59 AM       Revision History    Date/Time User Provider Type Action   2/17/2022 10:59 AM Jonn Parikh MD Physician Cosign   2/15/2022 11:12 AM Zan Flowers RCP Respiratory Therapist Sign

## 2022-04-14 ENCOUNTER — HOSPITAL ENCOUNTER (OUTPATIENT)
Dept: CARDIAC REHAB | Age: 77
Setting detail: THERAPIES SERIES
Discharge: HOME | End: 2022-04-14
Payer: MEDICARE

## 2022-04-14 PROCEDURE — 94626 PHY/QHP OP PULM RHB W/MNTR: CPT

## 2022-04-19 ENCOUNTER — HOSPITAL ENCOUNTER (OUTPATIENT)
Dept: CARDIAC REHAB | Age: 77
Setting detail: THERAPIES SERIES
Discharge: HOME | End: 2022-04-19
Payer: MEDICARE

## 2022-04-19 ENCOUNTER — OFFICE VISIT (OUTPATIENT)
Dept: PULMONOLOGY | Age: 77
End: 2022-04-19
Payer: MEDICARE

## 2022-04-19 VITALS
HEART RATE: 74 BPM | BODY MASS INDEX: 30.18 KG/M2 | DIASTOLIC BLOOD PRESSURE: 72 MMHG | SYSTOLIC BLOOD PRESSURE: 118 MMHG | OXYGEN SATURATION: 98 % | HEIGHT: 71 IN | TEMPERATURE: 96.2 F | WEIGHT: 215.6 LBS

## 2022-04-19 DIAGNOSIS — E66.9 OBESITY (BMI 30.0-34.9): ICD-10-CM

## 2022-04-19 DIAGNOSIS — J43.2 CENTRILOBULAR EMPHYSEMA (HCC): ICD-10-CM

## 2022-04-19 DIAGNOSIS — J96.12 CHRONIC RESPIRATORY FAILURE WITH HYPOXIA AND HYPERCAPNIA (HCC): Primary | ICD-10-CM

## 2022-04-19 DIAGNOSIS — J96.11 CHRONIC RESPIRATORY FAILURE WITH HYPOXIA AND HYPERCAPNIA (HCC): Primary | ICD-10-CM

## 2022-04-19 PROCEDURE — 99214 OFFICE O/P EST MOD 30 MIN: CPT | Performed by: NURSE PRACTITIONER

## 2022-04-19 PROCEDURE — 1123F ACP DISCUSS/DSCN MKR DOCD: CPT | Performed by: NURSE PRACTITIONER

## 2022-04-19 PROCEDURE — 4040F PNEUMOC VAC/ADMIN/RCVD: CPT | Performed by: NURSE PRACTITIONER

## 2022-04-19 PROCEDURE — G8427 DOCREV CUR MEDS BY ELIG CLIN: HCPCS | Performed by: NURSE PRACTITIONER

## 2022-04-19 PROCEDURE — 1036F TOBACCO NON-USER: CPT | Performed by: NURSE PRACTITIONER

## 2022-04-19 PROCEDURE — 3023F SPIROM DOC REV: CPT | Performed by: NURSE PRACTITIONER

## 2022-04-19 PROCEDURE — 94626 PHY/QHP OP PULM RHB W/MNTR: CPT

## 2022-04-19 PROCEDURE — G8417 CALC BMI ABV UP PARAM F/U: HCPCS | Performed by: NURSE PRACTITIONER

## 2022-04-19 ASSESSMENT — ENCOUNTER SYMPTOMS
EYES NEGATIVE: 1
COUGH: 0
ABDOMINAL PAIN: 0
NAUSEA: 0
VOMITING: 0
WHEEZING: 0
CHEST TIGHTNESS: 0
SHORTNESS OF BREATH: 1
DIARRHEA: 0

## 2022-04-19 NOTE — PROGRESS NOTES
[facility]     Patient: Naye Mendez, 67 y.o.   : 1945    [facility] Mine      Subjective     Chief Complaint   Patient presents with    Follow-up     Chronic respiratory failure 3 month follow up         HPI  Peggy Alford is here for 3 months follow up for Chronic Respiratory Failure   Current Inhalers:  Spiriva respimat 2.5 mcg, ALbuterol Nebs- seldomly using  , Advair 250/50 discus    Current symptoms: SOB w/ exertion. No wheezing or cough  Is in pulm rehab and getting benefit. Has lost weight   Symptoms are stable  Did not qualify for home BiPAP due to no hypoxia on overnight pulse ox while on baseline 2LPM   Any recent exacerbations that have required oral atb or steroids No  Any new medical issues since last visit : No    Last spirometry: Full PFT 2021    comparison to 2017 shows worsening restriction and obstruction as well as  decline in diffusion     Patient is on home oxygen therapy:   Current Oxygen order: 0LPM w/ rest,  3LPM with activity- is using compliantly and noticing benefit with use, c/o inconvenience and heaviness of portable tanks . Did POC walk test at [facility] , did not qualify ,.  He is not happy about this./   Feels limited/ restricted from doing things he enjoys due to needing portable oxygen tanks   Using 2LPM w/ sleep ( since 2017)     Last 6 min walk:2021  is  using O2 compliantly     UTD with flu vaccine Yes  UTD with pneumonia vaccine Yes  Personal history of COVID 19 No   Internal Administration   First Dose COVID-19, Moderna, Primary or Immunocompromised, PF, 100mcg/0.5mL  2021   Second Dose COVID-19, Moderna, Primary or Immunocompromised, PF, 100mcg/0.5mL   2021       Last COVID Lab SARS-CoV-2, NAAT (no units)   Date Value   2021 NOT DETECTED     SARS-COV-2, POC (no units)   Date Value   11/10/2021 Not-Detected               SOCIAL HISTORY:  Social History     Tobacco Use    Smoking status: Former Smoker     Packs/day: 2.00     Years: 20.00     Pack years: 40.00     Types: Cigarettes     Quit date: 1991     Years since quittin.9    Smokeless tobacco: Never Used   Substance Use Topics    Alcohol use: Yes     Alcohol/week: 0.0 standard drinks     Comment: rarely    Drug use: No         CURRENT MEDICATIONS:  Current Outpatient Medications   Medication Sig Dispense Refill    tiotropium (SPIRIVA RESPIMAT) 2.5 MCG/ACT AERS inhaler Inhale 2 puffs into the lungs daily 3 each 1    ADVAIR DISKUS 250-50 MCG/DOSE AEPB USE 1 INHALATION TWICE A  each 3    metFORMIN (GLUCOPHAGE) 500 MG tablet Take 1 tablet by mouth 2 times daily (with meals) HOLD 48 HRS PRIOR TO SURGERY 60 tablet 5    albuterol (PROVENTIL) (2.5 MG/3ML) 0.083% nebulizer solution Take 3 mLs by nebulization every 6 hours as needed for Wheezing or Shortness of Breath 120 each 3    BD INSULIN SYRINGE U/F 31G X 5/16\" 0.5 ML MISC USE AS DIRECTED UNDER THE SKIN TWICE DAILY      atorvastatin (LIPITOR) 10 MG tablet Take 1 tablet by mouth nightly       doxazosin (CARDURA) 8 MG tablet Take 1 tablet by mouth nightly       albuterol sulfate (PROAIR RESPICLICK) 540 (90 Base) MCG/ACT aerosol powder inhalation Inhale 2 puffs into the lungs every 6 hours as needed for Wheezing or Shortness of Breath 3 Inhaler 3    OXYGEN Inhale into the lungs 1 L at rest and 3 L with activity      sildenafil (VIAGRA) 100 MG tablet Take 100 mg by mouth as needed for Erectile Dysfunction       TRAVATAN Z 0.004 % SOLN ophthalmic solution Place 1 drop into both eyes nightly.  Insulin Lispro Prot & Lispro (HUMALOG MIX 75/25 SC) Inject 30 Units into the skin 2 times daily       quinapril (ACCUPRIL) 20 MG tablet Take 20 mg by mouth 2 times daily.  esomeprazole (NEXIUM) 40 MG capsule Take 40 mg by mouth 2 times daily. No current facility-administered medications for this visit. Maria Del Rosario VARNER   Review of Systems   Constitutional: Positive for fatigue (fatigues with exertional activity ). Negative for activity change, appetite change, chills, fever and unexpected weight change. HENT: Negative. Eyes: Negative. Respiratory: Positive for shortness of breath. Negative for cough, chest tightness and wheezing. Cardiovascular: Negative for chest pain, palpitations and leg swelling. Gastrointestinal: Negative for abdominal pain, diarrhea, nausea and vomiting. Genitourinary: Negative. Musculoskeletal: Negative. Skin: Negative. Neurological: Negative. Hematological: Negative. Psychiatric/Behavioral: Negative. Negative for sleep disturbance. Physical exam   /72 (Site: Left Upper Arm, Position: Sitting, Cuff Size: Medium Adult)   Pulse 74   Temp 96.2 °F (35.7 °C)   Ht 5' 11\" (1.803 m)   Wt 215 lb 9.6 oz (97.8 kg)   SpO2 98% Comment: on 1 liter o2  BMI 30.07 kg/m²      Wt Readings from Last 3 Encounters:   04/19/22 215 lb 9.6 oz (97.8 kg)   03/18/22 221 lb 3.2 oz (100.3 kg)   02/15/22 228 lb 8 oz (103.6 kg)     Physical Exam  Vitals and nursing note reviewed. Constitutional:       General: He is not in acute distress. Appearance: Normal appearance. He is well-developed and overweight. Interventions: Nasal cannula in place. HENT:      Mouth/Throat:      Lips: Pink. Mouth: Mucous membranes are moist.      Pharynx: Oropharynx is clear. No oropharyngeal exudate or posterior oropharyngeal erythema. Eyes:      Conjunctiva/sclera: Conjunctivae normal.   Neck:      Vascular: No JVD. Cardiovascular:      Rate and Rhythm: Normal rate and regular rhythm. Heart sounds: No murmur heard. No friction rub. Pulmonary:      Effort: Pulmonary effort is normal. No accessory muscle usage or respiratory distress. Breath sounds: Normal breath sounds. No wheezing, rhonchi or rales. Chest:      Chest wall: No tenderness. Musculoskeletal:      Right lower leg: No edema. Left lower leg: No edema. Skin:     General: Skin is warm and dry. Capillary Refill: Capillary refill takes less than 2 seconds. Nails: There is no clubbing. Neurological:      Mental Status: He is alert and oriented to person, place, and time. Psychiatric:         Mood and Affect: Mood normal.         Behavior: Behavior normal.         Thought Content: Thought content normal.         Judgment: Judgment normal.          Test results   Lung Nodule Screening     [] Qualifies    [x]Does not qualify   [] Declined    [] Completed     PFT 12/30/2021         ABG 12/30/2021       Ref. Range 12/30/2021 08:54   Source: Unknown R Radial   pH, Blood Gas Latest Ref Range: 7.35 - 7.45  7.37   PCO2 Latest Ref Range: 35 - 45 mmhg 50 (H)   pO2 Latest Ref Range: 71 - 104 mmhg 56 (L)   HCO3 Latest Ref Range: 23 - 28 mmol/l 28   Base Excess (Calculated) Latest Ref Range: -2.5 - 2.5 mmol/l 2.1   O2 Sat Latest Units: % 87   DEVICE Unknown Room Air   COLLECTED BY: Unknown [id number]        Assessment      Diagnosis Orders   1. Chronic respiratory failure with hypoxia and hypercapnia (HCC)     2. Centrilobular emphysema (HCC)     3. Obesity (BMI 30.0-34. 9)       Plan    -looks and feels good today: continue pulmonary rehab - getting much benefit   -continue [facility] in [facility]  -continue oxygen at 2LPM at night , was recently decreased to 2LPM w/ activity ( from 3LPM ) by pulm rehab ]  -avoid ill contacts  -keep UTD on recommended vaccinations    Will see Gail Holloway in: 1 year  billing based on time: total time on encounter 30 min   Electronically signed by SHONNA Beltran CNP on 4/19/2022 at 1:39 PM

## 2022-04-21 ENCOUNTER — HOSPITAL ENCOUNTER (OUTPATIENT)
Dept: CARDIAC REHAB | Age: 77
Setting detail: THERAPIES SERIES
Discharge: HOME | End: 2022-04-21
Payer: MEDICARE

## 2022-04-21 PROCEDURE — 94626 PHY/QHP OP PULM RHB W/MNTR: CPT

## 2022-04-26 ENCOUNTER — HOSPITAL ENCOUNTER (OUTPATIENT)
Dept: CARDIAC REHAB | Age: 77
Setting detail: THERAPIES SERIES
Discharge: HOME | End: 2022-04-26
Payer: MEDICARE

## 2022-04-26 PROCEDURE — 94626 PHY/QHP OP PULM RHB W/MNTR: CPT

## 2022-04-28 ENCOUNTER — HOSPITAL ENCOUNTER (OUTPATIENT)
Dept: CARDIAC REHAB | Age: 77
Setting detail: THERAPIES SERIES
Discharge: HOME | End: 2022-04-28
Payer: MEDICARE

## 2022-04-28 PROCEDURE — 94626 PHY/QHP OP PULM RHB W/MNTR: CPT

## 2022-05-03 ENCOUNTER — HOSPITAL ENCOUNTER (OUTPATIENT)
Dept: CARDIAC REHAB | Age: 77
Setting detail: THERAPIES SERIES
Discharge: HOME | End: 2022-05-03
Payer: MEDICARE

## 2022-05-03 PROCEDURE — 94626 PHY/QHP OP PULM RHB W/MNTR: CPT

## 2022-05-05 ENCOUNTER — HOSPITAL ENCOUNTER (OUTPATIENT)
Dept: CARDIAC REHAB | Age: 77
Setting detail: THERAPIES SERIES
Discharge: HOME | End: 2022-05-05
Payer: MEDICARE

## 2022-05-05 PROCEDURE — 94626 PHY/QHP OP PULM RHB W/MNTR: CPT

## 2022-05-10 ENCOUNTER — HOSPITAL ENCOUNTER (OUTPATIENT)
Dept: CARDIAC REHAB | Age: 77
Setting detail: THERAPIES SERIES
Discharge: HOME | End: 2022-05-10
Payer: MEDICARE

## 2022-05-10 PROCEDURE — 94626 PHY/QHP OP PULM RHB W/MNTR: CPT

## 2022-05-10 NOTE — PROGRESS NOTES
[facility] Facility-Based Therapy for COPD   INDIVIDUAL TREATMENT PLAN (ITP)     Name: Jostin Marcelino   :  1945  Acct Number: [[id number]]   AGE: 67 y.o. Diagnosis:  Severe COPD, which is GOLD Stage 3                                               PFT:  Post Bronchodilator FEV1/FVC: 65  FEV1: 37    Patient Goals:  (x) Breathe better  (x) Increase my endurance (x) Have more energy  (x) Rely less on others  (x) Ease ADLs  (x) Understand and use meds correctly  (x) Control cough  (x) Make fewer visits to hospital  () Quit smoking  (x) Feel less anxious / have more confidence    Glossary:  IL=Pulmonary Rehab  PF=Physical Fitness TM=Treadmill  AD=Schwinn Airdyne  UBE=UpperBody Ergometer  RT=Resistance Training  PLB=Pursed Lip Breathing      COVID -19 Screen  Do you have any of the following symptoms:  [] Fever [] Cough [] SOB [] Muscle/Body Ache [] Loss of taste/smell [x] None    Have you traveled outside of the [place]? [] Yes      [x] No    Have you been around anyone who has tested Positive for COVID 19?  [] Yes      [x] No    The following Education has been completed with patient. [x] Wait in the lobby until we call you back to rehab. [x] You must wear a mask while in the medical center and in pulmonary rehab unless exercising. Please bring your own. [x] Bring your own bottle of water with you. [x] You can not bring anyone with you in to the rehab clinic at this time. They are welcome to sit in the lobby or wait in the car.       INDIVIDUAL TREATMENT PLAN    INITIAL ASSESSMENT    Day #1 INDIVIDUAL TREATMENT PLAN    PLAN      Day #2-30 INDIVIDUAL TREATMENT PLAN    RE ASSESSMENT    Day #31-60 INDIVIDUAL TREATMENT PLAN    RE ASSESSMENT    Day #61-90 INDIVIDUAL TREATMENT PLAN    RE ASSESSMENT    Day # INDIVIDUAL TREATMENT PLAN    DISCHARGE      Last Day        Date: 2/15/2022     Date: 3/15/2022    Clive Kareem is not here today for reassessment    Date: 5/10/22   Date: Date:    Date:    EXERCISE   INITIAL ASSESSMENT      Prescribed Oxygen Use  Activity: 3 L/min  (x) Continuous  () Breath Actuated      Oxygen Titration  (x) Assess for desaturation            Current Home Exercise:  None   EXERCISE  PLAN        Current Oxygen Use  Activity:  3L/min  (x) Continuous  () Breath Actuated      Oxygen Titration  (x) Maintaining >87% Spo2  () Not maintaining -  L/min needed on       Current Home Exercise:  unknown   EXERCISE  RE ASSESSMENT      Current Oxygen Use  Activity:  L/min  (x) Continuous  () Breath Actuated      Oxygen Titration  (x) Maintaining >87% Spo2  () Not maintaining -  L/min needed on       Current Home Exercise:  Frequency:  x/wk  Intensity:  /10  Duration:  min  Mode: EXERCISE  RE ASSESSMENT      Current Oxygen Use  Activity:  L/min  () Continuous  () Breath Actuated      Oxygen Titration  () Maintaining >87% Spo2  () Not maintaining -  L/min needed on       Current Home Exercise:  Frequency:  x/wk  Intensity:  /10  Duration:  min  Mode: EXERCISE  RE ASSESSMENT      Current Oxygen Use  Activity:  L/min  () Continuous  () Breath Actuated      Oxygen Titration  () Maintaining >87% Spo2  () Not maintaining -  L/min needed on       Current Home Exercise:  Frequency:  x/wk  Intensity:  /10  Duration:  min  Mode:   EXERCISE  DISCHARGE        Final Oxygen Use  Activity:  L/min  () Continuous  () Breath Actuated      Oxygen Titration  () Maintaining >87% Spo2  () Not maintaining -  L/min needed on       Current Home Exercise:  Frequency:  x/wk  Intensity:  /10  Duration:  min  Mode:       6 Minute Walk Test (6MWT):  O2 used: 3LPM nasal cannula   960 ft walked = 2.3 METs  SpO2: 88-98%  HR:    RPD: 4  RPE: 6  Number of Breaks: 2   Avg time for break:  both breaks were approx 7 secs  Assist device used: none         6 Minute Walk Test (6MWT):  O2 used:   ft walked =  METs  SpO2:  %  HR:    RPD:   RPE:  Number of Breaks:    Avg time for break:  secs  Assist device used: OUTCOMES:  6MWD Improvement:  > 98'?  () Yes  () No     Exercise Prescription    THR: 71 - 114 bpm    Frequency:  2-3x/wk in [place], 1-3x/wk home activity    Intensity:  METs (from 6MWT)      Time:  15-30 total minutes up to 55 minutes max    Type:  Aerobic (TM, AD, UBE, NuStep), 6 ST, RT 1-10# 8-15 reps    Progression:  Increase 30s - 2 min/mode for Aerobic activity, and increase Intensity 2-5% each week if RPE <5, RPD <5, SpO2 >87% and pt is within [facility] above. Exercise Prescription    (x) Pt exercising within THR    Frequency:  2-3x/wk in [place], 1-3x/wk home activity    Intensity:  3-5 on Greta Dyspnea/ Fatigue scale    Time:  15-30 total minutes up to 55 minutes max    Type:  Aerobic (TM, AD, UBE, NuStep), 6 ST, RT 1-10# 8-15 reps    Progression:  Increase 30s - 2 min/mode for Aerobic activity, and increase Intensity 2-5% each week if RPE <5, RPD <5, SpO2 >87% and pt is within [facility] above. Exercise Prescription    (x) Pt exercising within THR    Frequency:  2-3x/wk in [place], 2-5x/wk home activity    Intensity:  3-5 on Greta Dyspnea/ Fatigue scale    Time:  30-45 total minutes up to 55 minutes max    Type:  Aerobic (TM, AD, UBE, NuStep), 6 ST, RT 1-10# 8-15 reps    Progression:  Increase 30s - 2 min/mode for Aerobic activity, and increase Intensity 2-5% each week if RPE <5, RPD <5, SpO2 >87% and pt is within [facility] above. Exercise Prescription    () Pt exercising within THR    Frequency:  2-3x/wk in [place], 2-5x/wk home activity    Intensity:  3-5 on Greta Dyspnea/ Fatigue scale    Time:  30-45 total minutes up to 55 minutes max    Type:  Aerobic (TM, AD, UBE, NuStep), 6 ST, RT 1-10# 8-15 reps    Progression:  Increase 30s - 2 min/mode for Aerobic activity up to 35 minutes, and increase Intensity 2-5% each week if RPE <5, RPD <5, SpO2 >87% and pt is within [facility] above.  Exercise Prescription    () Pt exercising within THR    Frequency:  2-3x/wk in [place], 2-5x/wk home activity    Intensity:  3-5 on Greta Dyspnea/ Fatigue scale    Time:  30-45 total minutes up to 55 minutes max    Type:  Aerobic (TM, AD, UBE, NuStep), 6 ST, RT 1-10# 8-15 reps    Progression:  Increase 30s - 2 min/mode for Aerobic activity up to 35 minutes, and increase Intensity 2-5% each week if RPE <5, RPD <5, SpO2 >87% and pt is within [facility] above. Home Program          (x) Given to patient with current levels, recommendation and guidelines.                                        Initial Levels:  TM:1.7mph,6min  AD:0.9level,6min  NuStep:48W, 6 min  UBE:0.4level, 5 min  RT 3#, 8-15 reps   Current Levels:  TM: 1.8 mph, 10 min  AD:  40watts, 8 min  UBE:  22watts, 5 min  RT 3 #, 8-15 reps   Current Levels:  TM: 1.8 mph, 12 min  AD: 40 level, 12 min  UBE: 28 schmidt, 5 min  RT  4#, 8-15 reps Current Levels:  TM:  mph,  min  AD:  level,  min  NuStep:  W,  min  UBE:  level, 5 min  RT  #, 8-15 reps Current Levels:  TM:  mph,  min  AD:  level,  min  NuStep:  W,  min  UBE:  level, 5 min  RT  #, 8-15 reps   Final Levels:  TM:  mph,  min  AD:  level,  min  NuStep:  W,  min  UBE:  level, 5 min  RT  #, 8-15 reps     Physical Limitations  Initial Assessment    () Weakness  () Inflexible  () Poor posture  () Gait abnormality  () Balance  () Orthopedic Issues   Physical Limitation  Plan      (x) Strengthen through squats and RT  (x) Appropriate stretches shown  (x)Verbal cues and reminders for posture and gait given  (x) Proper modalities chosen for ortho issues  (x) Give Home activity / stretches/ Warmup/ [place] info   Physical Limitations Reassessment      (x) Pt progressing  () Pt not progressing Physical Limitations Reassessment      () Pt progressing  () Pt not progressing Physical Limitations Reassessment      () Pt progressing  () Pt not progressing     Physical Limitations  Discharge      () Issues Addressed  () PT/OT suggested       Exercise Goals   Initial Assessment:    (x) Start to, and commit to exercising regularly     (x) Learn about home activity recommendations        (x) Increase PF shown by increasing 6MWD   Exercise Goals   Plan      -Initiate MI 2x/week  -Encourage home exercise    -Attend Home Activity EDUCATION class      -Slowly, safely, progress in MI    Exercise Goals   Reassessment      (x) Pt is coming regularly  () Pt is sporadic    () Pt has had class  () Pt has not had class        (x) Pt is progressing  () Pt is not progressing Exercise Goals   Reassessment      () Pt is coming regularly  () Pt is sporadic    () Pt has had class  () Pt has not had class        () Pt is progressing  () Pt is not progressing Exercise Goals   Reassessment      () Pt is coming regularly  () Pt is sporadic    () Pt has had class  () Pt has not had class        () Pt is progressing  () Pt is not progressing   Exercise Goals Discharge      () GOAL Met?  () GOAL not met -      () Pt had EDUCATION class  Date:   () Pt did not have class    () GOAL Met  See 6MWD above  () GOAL not Met:     Exercise Intervention Initial Assessment      () Transportation needed        (x) EDUCATION needed          (x) Motivation needed   Exercise Intervention/ Education   Plan      () [facility] set up        (x) EDUCATION:  Home activity class to be given        (x) Positive encouragement, support and motivation to be given   Exercise Intervention/ Education Reassessment      (x) Pt is attending  () Pt is not attending      () Pt has had class  (x) Pt has not had class      (x) Pt is progressing  () Pt not progressing Exercise Intervention/ Education Reassessment      () Pt is attending  () Pt is not attending      () Pt has had class  () Pt has not had class      () Pt is progressing  () Pt not progressing Exercise Intervention/ Education Reassessment      () Pt is attending  () Pt is not attending      () Pt has had class  () Pt has not had class      () Pt is progressing  () Pt not progressing Exercise Intervention/ Edcuation Discharge      () Pt attended most  () Pt cancelled a lot    () Pt has had class  Date: See above  () Pt did not have class    () Pt progressed and did well  () Pt had difficulty-               NUTRITION   INITIAL ASSESSMENT      Height:  511   Weight: 228.8 lbs  BMI: 31.9    30 day goal: 227 Lbs (2-4lbs)    (x) Overweight  () Underweight  () Normal  () Teeth issues  () GI issues  (x) Nutrition knowledge needed  (x) DM   NUTRITION   PLAN        Current Weight:  228.8lbs      Goal achieved?: no  Next 30 day goal: 227 Lbs   NUTRITION  RE ASSESSMENT      Current Weight: 228.8 lbs      Goal achieved?: no  Next 30 day goal: 227 Lbs NUTRITION  RE ASSESSMENT      Current Weight:  lbs      Goal achieved?:  Next 30 day goal: Lbs NUTRITION  RE ASSESSMENT      Current Weight:  lbs      Goal achieved?:  Next 30 day goal: Lbs   NUTRITION DISCHARGE        Current Weight:  lbs  BMI:    Goal achieved?:     Nutrition Goals  Initial Assessment    (x) Pt is DM.   Increase nutrition knowledge and glucose control through diet and exercise      (x) Learn weight strategies to lose weight        (x) Learn about general nutrition          -Achievable weight goal for the end of the program:  217 Lbs (2-4lbs per month recommended)   Nutrition Goals   Plan      (x) DM:  Attend nutrition class and learn about quality carbohydrates and exercises role in DM    (x) Attend nutrition class          (x) Attend nutrition class          (x) Initiate exercise and give pt hints and tips for weight and will have class for information   Nutrition Goals  Reassessment      () DM: Pt has had class  (x) DM: Pt has not had class           () Pt has had class  (x) Pt has not had class       () Pt has had class  (x) Pt has not had class       (x) Pt progressing  () Pt not progressing     Nutrition Goals  Reassessment      () DM: Pt has had class  () DM: Pt has not had class           () Pt has had class  () Pt has not had class       () Pt has had class  () Pt has not had class       () Pt progressing  () Pt not progressing     Nutrition Goals  Reassessment      () DM: Pt has had class  () DM: Pt has not had class           () Pt has had class  () Pt has not had class       () Pt has had class  () Pt has not had class       () Pt progressing  () Pt not progressing     Nutrition Goals  Discharge      () Pt had nutrition class  Date:  () Pt has not had class        () Pt has had class  Date: See above  () Pt has not had class    () Pt has class  Date: See above  () Pt has not had class      () Final weight goal achieved  () Pt did not reach desired weight goal - (readdressed nutrition and exercise strategies for future goal attainment)        Nutrition Intervention/ Education   Initial Assessment    (x) Pt needs Nutrition education class        () Pt states does not need class       Nutrition Intervention/ Education  Plan      (x) Pt will have Nutrition class          (x) Encourage pt to continue to learn and incorporate self knowledge in to diet choices   Nutrition Intervention/ Education  Reassessment      () Pt has had class  (x) Pt has not had class      () Pt had questions  (x) Pt denies having questions Nutrition Intervention/ Education  Reassessment      () Pt has had class  () Pt has not had class      () Pt had questions  () Pt denies having questions Nutrition Intervention/ Education  Reassessment      () Pt has had class  () Pt has not had class      () Pt had questions  () Pt denies having questions Nutrition Intervention/ Education   Discharge      () Pt has had class  Date: See above  () Pt has not had class - Reason:    () Pt had questions that were answered   () Pt denies having questions             PSYCHO SOCIAL INITIAL ASSESSMENT      Depression:  () Self Reported  () In History  () S/Sx noted  (x) Denies  () On Medication  () Follows physician      (x) Give PHQ-9 Depression screening tool                  Dyspnea:  (x) Give pt UCSD SOB survey      (x) Pt gets SOB during activity and ADLs.           (x) Pt has support from home for the program        () Pt does not have support for the program   PSYCHO SOCIAL  PLAN      (x) Attend Stress/ Depression/ Anxiety Class                Pre Rehab PHQ-9   Score: 4  1-4 Normal  5-9 Mild  10-14 Mod  15-19 Mod-Sev  >19 Severe        Pre Rehab UCSD SOB Score: 42      (x) Attend classes and attend rehab to increase strength and endurance      -Attend Psychosocial class          () Speak with the patient/ family about ability to commit to the program with undue stress/ anxiety   PSYCHO SOCIAL  RE ASSESSMENT    () Pt scored >10 on PHQ-9.   Spoke with pt privately about issues and *             () Pt recommended to contact physician for assistance                See below for ADLs        () Pt has had class  (x) Pt has not had class        Re assessment of support:  Good, pt has been attending PSYCHO SOCIAL  RE ASSESSMENT    () Pt is coping well  () Pt needs more assistance-              () Pt recommended to contact physician for assistance                See below          () Pt has had class  () Pt has not had           Re assessment of support:  Good, pt has been attending PSYCHO SOCIAL  RE ASSESSMENT    () Pt is coping well  () Pt needs more assistance-              () Pt recommended to contact physician for assistance                See below          () Pt has had class  () Pt has not had class        Re assessment of support:  Good, pt has been attending   PSYCHO SOCIAL DISCHARGE      (x) Pt attended class     Date:              Post Rehab PHQ-9   Depression Score:                Post Rehab UCSD SOB  Score:      (x) Pt is less SOB with ADLs            () Pt had class  Date: See above  () Pt did not have class        () Pt completed program  () Pt had to stop         Psychosocial Goals   Initial Assessment    (x) Maintain/ Decrease Depression Levels      (x) Maintain/ Decrease Anxiety Levels        (x) Learn about Emotional Health          (x) Increase QoL   (CAT tool)          (x) Give the CAT tool for HR QoL      Pre Rehab  CAT score:  9/ 40       Psychosocial Goals  Plan      (x) Attend Stress/ Anxiety/ Dyspnea - Panic control class      (x) Attend Stress/ Anxiety/ Dyspnea - Panic control class      (x) Attend Stress/ Anxiety/ Dyspnea - Panic control class      (x) Initiate MS, get stronger, more endurance and more confidence              30 day CAT score:  NA / 40     Psychosocial Goals  Reassessment      () Pt has had class  (x) Pt has not had class      () Pt has had class  (x) Pt has not had class      () Pt has had class  (x) Pt has not had class      (x) Pt is progressing  () Pt is not progressing              60 day CAT score:  15 / 40 Psychosocial Goals  Reassessment      () Pt has had class  () Pt has not had class      () Pt has had class  () Pt has not had class      () Pt has had class  () Pt has not had class      () Pt is progressing  () Pt is not progressing              90 day CAT score:  / 40 Psychosocial Goals  Reassessment      () Pt has had class  () Pt has not had class      () Pt has had class  () Pt has not had class      () Pt has had class  () Pt has not had class      () Pt is progressing  () Pt is not progressing              120 day CAT score:  / 40   Psychosocial Goals   Discharge      (x) Pt had class            (x) Pt had class            (x) Pt had class  Date: See above  () Pt did not have class      Post rehab CAT below                  Post Rehab   CAT score:  / 40              OUTCOMES    PHQ-9:  Did pt drop a severity level or maintain in the minimal range?  () Y  () N    UCSD SOB  Did pt decrease their number by 5 or more?  () Y  () N    CAT scores:  Did pt drop by 2 or more points from Pre to Post?  () Y  () N        Psychosocial Intervention/ Education   Initial Assessment    () Pt is being treated for depression/ anxiety        (x) Pt would benefit from [facility]s Psychosocial Issues Class (Stress, Depression, Anxiety, and Intimacy Psychosocial  Intervention/ Education  Plan      (x) Pt to contact physician if any severe changes occur in mood        (x) Pt will attend [facility]s class   Psychosocial Intervention/ Education Reassessment      (x) Pt is coping well  () Pt is not coping well         () Pt has had class  (x) Pt has not had class Psychosocial Intervention/ Education Reassessment      () Pt is coping well  () Pt is not coping well         () Pt has had class  () Pt has not had class Psychosocial Intervention/ Education Reassessment      () Pt is coping well  () Pt is not coping well         () Pt has had class  () Pt has not had class   Psychosocial Intervention/ Education Discharge      () Pt did not need any changes   () Pt needed changes to treatment      (x) Pt had class  Date: See above  () Pt did not have class         Pain   Initial Assessment    (x) N/A  Location:   Duration:   Intensity:  /10  Type:    Pain   Plan      (x) N/A    (x) Pts pain is under control  () Pt encouraged to contact physician for pain control   Pain   Reassessment      (x) N/A    (x) Pts pain is under control  () Pt encouraged to contact physician for pain control Pain   Reassessment      () N/A    () Pts pain is under control  () Pt encouraged to contact physician for pain control Pain   Reassessment      () N/A    () Pts pain is under control  () Pt encouraged to contact physician for pain control Pain   Discharge      () N/A    () Pts pain is under control  () Pt encouraged to contact physician for pain control           OXYGEN   INITIAL ASSESSMENT    RESTING:  () RA  (x) O2: 1 L/min  (x) Continuous  () Breath Actuated  97% SpO2 / 16 bpm    Prescribed Oxygen Use:  1 L/min at Rest and 3L/min with exertion    DME Company for O2:  [facility]     OXYGEN   PLAN      RESTING:  (x) Monitor needs, administer supplemental oxygen if SpO2 <88% OXYGEN   RE ASSESSMENT    RESTING:  (x) Pt SpO2 >87%  () Pt needs higher flow  () Pt needs less flow OXYGEN   RE ASSESSMENT    RESTING:  (x) Pt SpO2 >87%  () Pt needs higher flow  () Pt needs less flow OXYGEN   RE ASSESSMENT    RESTING:  (x) Pt SpO2 >87%  () Pt needs higher flow  () Pt needs less flow OXYGEN   DISCHARGE      RESTING:  () Pt SpO2 remained >87% on * L/min at rest    () Pts doctor and company contacted for change in Rx   Oxygen Goals   Initial Assessment    (x) Wean, Titrate down, or get off O2 if possible   Oxygen Goals   Plan      (x) Closely monitor SpO2 and HR using pulse oximetry for saturation and HR response, and titrate if appropriate   Oxygen Goals  Reassessment    () RA    On exertion:  (x) Pt maintaining FiO2  () Pt tolerating lower O2 needs  () Pt needing more O2   Oxygen Goals  Reassessment    () RA    On exertion:  () Pt maintaining FiO2  () Pt tolerating lower O2 needs  () Pt needing more O2 Oxygen Goals  Reassessment    () RA    On exertion:  () Pt maintaining FiO2  () Pt tolerating lower O2 needs  () Pt needing more O2     Oxygen Goals   Discharge    () RA    With Exertion:  () Pt maintained FiO2  () Pt was found to need less O2 - notification sent to physician  () Pt was found to need more O2, notification sent to physician     Oxygen Intervention/ Education  Initial Assessment    (x) Learn / Review about O2 use, safety and travel      () Pt does not wear or have home oxygen    Oxygen Intervention/ Education  Plan      (x) Attend O2 Use, safety and travel class        (x) Assess for SpO2 with each exercise   Oxygen Intervention/ Education  Reassessment      (x) Pt has had class  () Pt has not had class      (x) Pt is >87%  () Pt has been found to desaturate - physician notified Oxygen Intervention/ Education  Reassessment      () Pt has had class  () Pt has not had class      () Pt is >87%  () Pt has been found to desaturate - physician notified Oxygen Intervention/ Education  Reassessment      () Pt has had class  () Pt has not had class      () Pt is >87%  () Pt has been found to desaturate - physician notified   Oxygen Intervention/ Education  Discharge      () Pt had class  Date:   () Pt did not have class      () Pt did well  () Pt needed to be put on oxygen           TOBACCO USE  INITIAL ASSESSMENT    (x) Pt currently not smoking    () Pt currently smoking        () Pt needs Tobacco Cessation counseling          Smoked 2 ppd for 24 years  Pt quit in 1990.    TOBACCO USE  PLAN      (x) N/A      Does pt want to quit:   Does pt have a quit date:    () Tobacco Cessation counseling Education if applicable        () Encouraged to contact physician for tools/ nicotine replacement etc.   TOBACCO USE  RE ASSESSMENT    (x) N/A      Any change in Tobacco use status (x) N  ()Y      () Pt attended counseling education session            () Pt has contacted physician TOBACCO USE  RE ASSESSMENT    () N/A      Any change in Tobacco use status () N  ()Y      () Pt attended counseling education session            () Pt has contacted physician TOBACCO USE  RE ASSESSMENT    () N/A      Any change in Tobacco use status () N  ()Y      () Pt attended counseling education session            () Pt has contacted physician TOBACCO USE  DISCHARGE            Current Tobacco Use Status:         () Pt attended TC counseling education session  Date:           () Pt contacted physician        NRT product/ medication  :     Tobacco Use Goal  Initial Assessment      (x) Complete Cessation or Maintain Cessation of tobacco products   Tobacco Use Goal Intervention and Education Plan    (x Encourage pt to fight the urge, call quit line, use techniques learned from friends/ class    () Attend Tobacco Cessation class if needed   Tobacco Use  Reassessment          (x) Pt remains tobacco free            () Pt has had TC counseling session        () Pt still smoking Tobacco Use  Reassessment          () Pt remains tobacco free            () Pt has had TC counseling session        () Pt still smoking Tobacco Use  Reassessment          () Pt remains tobacco free            () Pt has had TC counseling session        () Pt still smoking Tobacco Use  Discharge          () Pt remains tobacco free            () Pt had TC counseling  Date:  See above        () Pt still smoking - gave resources for quitting             MEDICATIONS  (Oral & Inhaled)  INITIAL ASSESSMENT    Pt reports taking his meds properly 100% of the time      (x) Pt is on inhaled medications   MEDICATIONS  PLAN        -Review Rxs purpose, schedule, side effects and importance of compliance during Medication class. Also address Cpap, Vents.        MEDICATIONS  RE ASSESSMENT      Pt reports taking their meds properly 100 % of the time        (x) Pt has had class  () Pt has not had class MEDICATIONS  RE ASSESSMENT      Pt reports taking their meds properly  % of the time        () Pt has had class  () Pt has not had class MEDICATIONS  RE ASSESSMENT      Pt reports taking their meds properly  % of the time        () Pt has had class  () Pt has not had class MEDICATIONS  DISCHARGE        Pt reports taking their meds properly  % of the time        ()Pt had med class  Date:  () Pt has not had class       Pt has:  (x) Metered Dose Inhaler  (x) Dry Powder Inhaler  (x) Nebulizer   -Review correct use, timing, technique and cleaning of equipment during Medication class (x) Pt has had class  () Pt has not had class () Pt has had class  () Pt has not had class () Pt has had class  () Pt has not had class () Pt had class  Date: See above  () Pt has not had class   Medication Goals  Initial Assessment        (x) Take meds properly 100% of the time    (x) Learn about / Review Rxs, and devices Medication Goals  Intervention & Education  Plan      -Follow Rx instructions and ask if questions    -Attend Medication Education class   Medication Goals  Re assessment          (x) Readdressed questions on any medications    (x) Pt has had class  () Pt has not had class Medication Goals  Re assessment          () Readdressed questions on any medications    () Pt has had class  () Pt has not had class Medication Goals  Re assessment          () Readdressed questions on any medications    () Pt has had class  () Pt has not had class Medication Goals  Discharge          % proper med usage see above      () Pt had class  Date: See above  () Pt has not had class             ADL  INITIAL ASSESSMENT      (x) Impaired ADL ability  () Need for Assist Devices  (x) Generalized weakness, low functional capacity  (x) Stairs in house               ADL  PLAN        -Initiate NM, RT, and chair squats  -Breathing retraining, PLB, and pacing      -Encourage home activity               ADL  RE ASSESSMENT      (x) Pt is progressing  () Pt is not progressing        () Pt has initiated home activity  (x) Pt has not yet initiated  home activity-      (x) ADLs getting easier  () ADLs not getting easier   ADL  RE ASSESSMENT      () Pt is progressing  () Pt is not progressing        () Pt has initiated home activity  () Pt has not yet initiated  home activity-      () ADLs getting easier  () ADLs not getting easier ADL  RE ASSESSMENT      () Pt is progressing  () Pt is not progressing        () Pt has initiated home activity  () Pt has not yet initiated  home activity-      () ADLs getting easier  () ADLs not getting easier ADL  DISCHARGE        () Pt showed strength and endurance gains  () Pt did not progress      () Pt doing recommended home activity  () Pt not doing home activitiy         ADL Goals   Initial Assessment      (x) Decrease SOB with ADLs              (x) Decreased SOB overall      ADL Goals Intervention/ Education Plan      -Initiate NM, RT and chair squats and increase pts strength and endurance        -Pacing, Breathing retraining       ADL Goals Reassessment        (x) ADLs getting easier  () ADLs not getting easier          (x) Pt states activities are getting easier/ able to go longer/ not get as SOB   ADL Goals Reassessment        () ADLs getting easier  () ADLs not getting easier          () Pt states activities are getting easier/ able to go longer/ not get as SOB ADL Goals Reassessment        () ADLs getting easier  () ADLs not getting easier          () Pt states activities are getting easier/ able to go longer/ not get as SOB   ADL Goals   Discharge        Goal achieved?  () Yes  () No            Goal achieved?  () Yes  () No          Outcomes:  See Health and Functioning from the CAT tool and Strength and Endurance from 6 MWD above             EXACERBAT'N PREVENTION & MANAGEMENT  INITIAL ASSESSMENT      Pt reports;  () 0 respiratory infections  (x) 1   () >2  (x) Hospitalized in last 12 months   EXACERBAT'N PREVENTION & MANAGEMENT  PLAN        Address via Disease Self Management and Exacerbation prevention class:    -Hydration for mucus    -Hand Hygiene          -Evaluation of Sputum    -When to call MD  -S/Sx to report  -Decrease exposure to irritants/ exacerbators    -Cleaning of respiratory equipment   EXACERBAT'N PREVENTION & MANAGEMENT  RE ASSESSMENT      (x) Pt has had class  () Pt has not had class        (x) Improved hydration    (x) Correct hand washing techs and alcohol gel usage    (x) Sputum assessment    (x) Ability to recognize exacerbation and when to call MD        (x) Cleaning of respiratory equipment EXACERBAT'N PREVENTION & MANAGEMENT  RE ASSESSMENT      () Pt has had class  () Pt has not had class        () Improved hydration    () Correct hand washing techs and alcohol gel usage    () Sputum assessment    () Ability to recognize exacerbation and when to call MD        () Cleaning of respiratory equipment EXACERBAT'N PREVENTION & MANAGEMENT  RE ASSESSMENT      () Pt has had class  () Pt has not had class        () Improved hydration    () Correct hand washing techs and alcohol gel usage    () Sputum assessment    () Ability to recognize exacerbation and when to call MD        () Cleaning of respiratory equipment EXACERBAT'N PREVENTION & MANAGEMENT  DISCHARGE        () Pt has had class  Date:  () Pt did not have class              () Addressed questions and pt feels comfortable with hydration, hand washing, sputum assessment, exacerbation knowledge, and cleaning of equipment   Exacerbation Prevention & Management Goals  Initial Assessment      (x) Learn ways to stay healthy and not get admitted          (x) Increase knowledge of Lungs, Breathing, Exercise, Nutrition, Mood and controlling anxiety, and stopping the Dyspnea Cycle by attending classes   Exacerbation Prevention & Management Goals Intervention/ Education  Plan    -Attend class and demonstrate disease self management strategies      -Attend all appropriate classes                   Exacerbation Prevention & Management Goals  Reassessment        (x) Pt has had class  () Pt has not had class        () Pt has had all classes  (x) Pt has had some classes  () Pt has had little/ not staying for education Exacerbation Prevention & Management Goals  Reassessment        () Pt has had class  () Pt has not had class        () Pt has had all classes  () Pt has had some classes  () Pt has had little/ not staying for education Exacerbation Prevention & Management Goals  Reassessment        () Pt has had class  () Pt has not had class        () Pt has had all classes  () Pt has had most classes  () Pt has had some of the classes  () Pt has had little/ not staying for education Exacerbation Prevention & Management Goals  Discharge        () Pt had class  Date:  See above  () Pt did not have class        () Pt attended all relevant classes and all questions were answered                   PHYSICIAN INTERACTION    MD Initial Assessment Review    (x) Initial  Assessment Reviewed  (x) Treatment Plan and Goals support patient needs/ abilities                  Cosigned and dated below:   PHYSICIAN INTERACTION    MD 30 day and Plan Review:      (x) I have seen the patient in rehab during this 30 day reassessment  (x) I agree with the plan  (x) Continue with progression and instruction  () Continue, but with the following changes:      Cosigned and dated below: Demetrio Gurrola MD 30 day Reassessment Review:    (x) I have seen the patient in rehab during this 30 day reassessment  (x) Continue with the current program  () Continue, but with the following changes:  () Discharge patient      Cosigned and dated below: Demetrio Gurrola MD 30 day Reassessment Review:    (x) I have seen the patient in rehab during this 30 day reassessment  (x) Continue with the current program  () Continue, but with the following changes:  () Discharge patient      Cosigned and dated below: Demetrio Gurrola MD 30 day Reassessment Review:    (x) I have seen the patient in rehab during this 30 day reassessment  (x) Continue with the current program  () Continue, but with the following changes:  () Discharge patient      Cosigned and dated below: Demetrio Gurrola MD 30 day   Discharge Review:    (x) Discharge patient                            Cosigned and dated below:     Cosigned by: Adele Cisneros MD at 2/17/2022 10:59 AM       Revision History    Date/Time User Provider Type Action   2/17/2022 10:59 AM Adele Cisneros MD Physician Cosign   2/15/2022 11:12 AM Abhijeet Austin RCP Respiratory Therapist Sign     Cosigned by: Adele Cisneros MD at 3/23/2022  1:27 PM       Revision History    Date/Time User Provider Type Action   3/23/2022  1:27 PM Adele Cisneros MD Physician Cosign   3/15/2022 12:17 PM Donovan Hilario Exercise Physiologist Sign

## 2022-05-12 ENCOUNTER — HOSPITAL ENCOUNTER (OUTPATIENT)
Dept: CARDIAC REHAB | Age: 77
Setting detail: THERAPIES SERIES
Discharge: HOME | End: 2022-05-12
Payer: MEDICARE

## 2022-05-12 PROCEDURE — 94626 PHY/QHP OP PULM RHB W/MNTR: CPT

## 2022-05-17 ENCOUNTER — HOSPITAL ENCOUNTER (OUTPATIENT)
Dept: CARDIAC REHAB | Age: 77
Setting detail: THERAPIES SERIES
Discharge: HOME | End: 2022-05-17
Payer: MEDICARE

## 2022-05-17 PROCEDURE — 94626 PHY/QHP OP PULM RHB W/MNTR: CPT

## 2022-05-19 ENCOUNTER — HOSPITAL ENCOUNTER (OUTPATIENT)
Dept: CARDIAC REHAB | Age: 77
Setting detail: THERAPIES SERIES
Discharge: HOME | End: 2022-05-19
Payer: MEDICARE

## 2022-05-19 PROCEDURE — 94626 PHY/QHP OP PULM RHB W/MNTR: CPT

## 2022-05-24 ENCOUNTER — HOSPITAL ENCOUNTER (OUTPATIENT)
Dept: CARDIAC REHAB | Age: 77
Setting detail: THERAPIES SERIES
Discharge: HOME | End: 2022-05-24
Payer: MEDICARE

## 2022-05-24 PROCEDURE — 94626 PHY/QHP OP PULM RHB W/MNTR: CPT

## 2022-05-26 ENCOUNTER — HOSPITAL ENCOUNTER (OUTPATIENT)
Dept: CARDIAC REHAB | Age: 77
Setting detail: THERAPIES SERIES
Discharge: HOME | End: 2022-05-26
Payer: MEDICARE

## 2022-05-31 ENCOUNTER — HOSPITAL ENCOUNTER (OUTPATIENT)
Dept: CARDIAC REHAB | Age: 77
Setting detail: THERAPIES SERIES
Discharge: HOME | End: 2022-05-31
Payer: MEDICARE

## 2022-05-31 PROCEDURE — 94626 PHY/QHP OP PULM RHB W/MNTR: CPT

## 2022-06-02 ENCOUNTER — HOSPITAL ENCOUNTER (OUTPATIENT)
Dept: CARDIAC REHAB | Age: 77
Setting detail: THERAPIES SERIES
Discharge: HOME | End: 2022-06-02
Payer: MEDICARE

## 2022-06-02 PROCEDURE — 94626 PHY/QHP OP PULM RHB W/MNTR: CPT

## 2022-06-07 ENCOUNTER — HOSPITAL ENCOUNTER (OUTPATIENT)
Dept: CARDIAC REHAB | Age: 77
Setting detail: THERAPIES SERIES
Discharge: HOME | End: 2022-06-07
Payer: MEDICARE

## 2022-06-07 NOTE — PROGRESS NOTES
[facility]-Based Therapy for COPD   INDIVIDUAL TREATMENT PLAN (ITP)     Name: Marilu Shepherd   :  1945  Acct Number: [[id number]]   AGE: 67 y.o. Diagnosis:  Severe COPD, which is GOLD Stage 3                                               PFT:  Post Bronchodilator FEV1/FVC: 65  FEV1: 37    Patient Goals:  (x) Breathe better  (x) Increase my endurance (x) Have more energy  (x) Rely less on others  (x) Ease ADLs  (x) Understand and use meds correctly  (x) Control cough  (x) Make fewer visits to hospital  () Quit smoking  (x) Feel less anxious / have more confidence    Glossary:  IL=Pulmonary Rehab  PF=Physical Fitness TM=Treadmill  AD=Schwinn Airdyne  UBE=UpperBody Ergometer  RT=Resistance Training  PLB=Pursed Lip Breathing      COVID -19 Screen  Do you have any of the following symptoms:  [] Fever [] Cough [] SOB [] Muscle/Body Ache [] Loss of taste/smell [x] None    Have you traveled outside of the [place]? [] Yes      [x] No    Have you been around anyone who has tested Positive for COVID 19?  [] Yes      [x] No    The following Education has been completed with patient. [x] Wait in the lobby until we call you back to rehab. [x] You must wear a mask while in the medical center and in pulmonary rehab unless exercising. Please bring your own. [x] Bring your own bottle of water with you. [x] You can not bring anyone with you in to the rehab clinic at this time. They are welcome to sit in the lobby or wait in the car.       INDIVIDUAL TREATMENT PLAN    INITIAL ASSESSMENT    Day #1 INDIVIDUAL TREATMENT PLAN    PLAN      Day #2-30 INDIVIDUAL TREATMENT PLAN    RE ASSESSMENT    Day #31-60 INDIVIDUAL TREATMENT PLAN    RE ASSESSMENT    Day #61-90 INDIVIDUAL TREATMENT PLAN    RE ASSESSMENT    Day # INDIVIDUAL TREATMENT PLAN    DISCHARGE      Last Day        Date: 2/15/2022     Date: 3/15/2022    Kym Flanaganluis is not here today for reassessment    Date: 5/10/22   Date: 6/7/22   Date:    Date:    EXERCISE   INITIAL ASSESSMENT      Prescribed Oxygen Use  Activity: 3 L/min  (x) Continuous  () Breath Actuated      Oxygen Titration  (x) Assess for desaturation            Current Home Exercise:  None   EXERCISE  PLAN        Current Oxygen Use  Activity:  3L/min  (x) Continuous  () Breath Actuated      Oxygen Titration  (x) Maintaining >87% Spo2  () Not maintaining -  L/min needed on       Current Home Exercise:  unknown   EXERCISE  RE ASSESSMENT      Current Oxygen Use  Activity: 2 L/min  (x) Continuous  () Breath Actuated      Oxygen Titration  (x) Maintaining >87% Spo2  () Not maintaining -  L/min needed on       Current Home Exercise:  Frequency:  x/wk  Intensity:  /10  Duration:  min  Mode: EXERCISE  RE ASSESSMENT      Current Oxygen Use  Activity: 2 L/min  (x) Continuous  () Breath Actuated      Oxygen Titration  (x) Maintaining >87% Spo2  () Not maintaining -  L/min needed on       Current Home Exercise:  unknown EXERCISE  RE ASSESSMENT      Current Oxygen Use  Activity:  L/min  () Continuous  () Breath Actuated      Oxygen Titration  () Maintaining >87% Spo2  () Not maintaining -  L/min needed on       Current Home Exercise:  Frequency:  x/wk  Intensity:  /10  Duration:  min  Mode:   EXERCISE  DISCHARGE        Final Oxygen Use  Activity:  L/min  () Continuous  () Breath Actuated      Oxygen Titration  () Maintaining >87% Spo2  () Not maintaining -  L/min needed on       Current Home Exercise:  Frequency:  x/wk  Intensity:  /10  Duration:  min  Mode:       6 Minute Walk Test (6MWT):  O2 used: 3LPM nasal cannula   960 ft walked = 2.3 METs  SpO2: 88-98%  HR:    RPD: 4  RPE: 6  Number of Breaks: 2   Avg time for break:  both breaks were approx 7 secs  Assist device used: none         6 Minute Walk Test (6MWT):  O2 used:   ft walked =  METs  SpO2:  %  HR:    RPD:   RPE:  Number of Breaks:    Avg time for break:  secs  Assist device used:             OUTCOMES:  6MWD Improvement:  > 98'?  () Yes  () No     Exercise Prescription    THR: 71 - 114 bpm    Frequency:  2-3x/wk in [place], 1-3x/wk home activity    Intensity:  METs (from 6MWT)      Time:  15-30 total minutes up to 55 minutes max    Type:  Aerobic (TM, AD, UBE, NuStep), 6 ST, RT 1-10# 8-15 reps    Progression:  Increase 30s - 2 min/mode for Aerobic activity, and increase Intensity 2-5% each week if RPE <5, RPD <5, SpO2 >87% and pt is within [facility] above. Exercise Prescription    (x) Pt exercising within THR    Frequency:  2-3x/wk in [place], 1-3x/wk home activity    Intensity:  3-5 on Greta Dyspnea/ Fatigue scale    Time:  15-30 total minutes up to 55 minutes max    Type:  Aerobic (TM, AD, UBE, NuStep), 6 ST, RT 1-10# 8-15 reps    Progression:  Increase 30s - 2 min/mode for Aerobic activity, and increase Intensity 2-5% each week if RPE <5, RPD <5, SpO2 >87% and pt is within [facility] above. Exercise Prescription    (x) Pt exercising within THR    Frequency:  2-3x/wk in [place], 2-5x/wk home activity    Intensity:  3-5 on Greta Dyspnea/ Fatigue scale    Time:  30-45 total minutes up to 55 minutes max    Type:  Aerobic (TM, AD, UBE, NuStep), 6 ST, RT 1-10# 8-15 reps    Progression:  Increase 30s - 2 min/mode for Aerobic activity, and increase Intensity 2-5% each week if RPE <5, RPD <5, SpO2 >87% and pt is within [facility] above. Exercise Prescription    (x) Pt exercising within THR    Frequency:  2-3x/wk in [place], 2-5x/wk home activity    Intensity:  3-5 on Greta Dyspnea/ Fatigue scale    Time:  30-45 total minutes up to 55 minutes max    Type:  Aerobic (TM, AD, UBE, NuStep), 6 ST, RT 1-10# 8-15 reps    Progression:  Increase 30s - 2 min/mode for Aerobic activity up to 35 minutes, and increase Intensity 2-5% each week if RPE <5, RPD <5, SpO2 >87% and pt is within [facility] above.  Exercise Prescription    () Pt exercising within THR    Frequency:  2-3x/wk in [place], 2-5x/wk home activity    Intensity:  3-5 on Greta Dyspnea/ Fatigue scale    Time:  30-45 total minutes up to 55 minutes max    Type:  Aerobic (TM, AD, UBE, NuStep), 6 ST, RT 1-10# 8-15 reps    Progression:  Increase 30s - 2 min/mode for Aerobic activity up to 35 minutes, and increase Intensity 2-5% each week if RPE <5, RPD <5, SpO2 >87% and pt is within [facility] above. Home Program          (x) Given to patient with current levels, recommendation and guidelines.                                        Initial Levels:  TM:1.7mph,6min  AD:0.9level,6min  NuStep:48W, 6 min  UBE:0.4level, 5 min  RT 3#, 8-15 reps   Current Levels:  TM: 1.8 mph, 10 min  AD:  40watts, 8 min  UBE:  22watts, 5 min  RT 3 #, 8-15 reps   Current Levels:  TM: 1.8 mph, 12 min  AD: 40 level, 12 min  UBE: 28 schmidt, 5 min  RT  4#, 8-15 reps Current Levels:  TM: 1.8 mph, 15 min  NuStep: 48 W, 10 min  UBE: 30 level, 5 min  RT 4 #, 8-15 reps Current Levels:  TM:  mph,  min  AD:  level,  min  NuStep:  W,  min  UBE:  level, 5 min  RT  #, 8-15 reps   Final Levels:  TM:  mph,  min  AD:  level,  min  NuStep:  W,  min  UBE:  level, 5 min  RT  #, 8-15 reps     Physical Limitations  Initial Assessment    () Weakness  () Inflexible  () Poor posture  () Gait abnormality  () Balance  () Orthopedic Issues   Physical Limitation  Plan      (x) Strengthen through squats and RT  (x) Appropriate stretches shown  (x)Verbal cues and reminders for posture and gait given  (x) Proper modalities chosen for ortho issues  (x) Give Home activity / stretches/ Warmup/ [place] info   Physical Limitations Reassessment      (x) Pt progressing  () Pt not progressing Physical Limitations Reassessment      (x) Pt progressing  () Pt not progressing Physical Limitations Reassessment      () Pt progressing  () Pt not progressing     Physical Limitations  Discharge      () Issues Addressed  () PT/OT suggested       Exercise Goals   Initial Assessment:    (x) Start to, and commit to exercising regularly     (x) Learn about home activity recommendations        (x) Increase PF shown by increasing 6MWD   Exercise Goals   Plan      -Initiate [place] 2x/week  -Encourage home exercise    -Attend Home Activity EDUCATION class      -Slowly, safely, progress in [place]    Exercise Goals   Reassessment      (x) Pt is coming regularly  () Pt is sporadic    () Pt has had class  () Pt has not had class        (x) Pt is progressing  () Pt is not progressing Exercise Goals   Reassessment      (x) Pt is coming regularly  () Pt is sporadic    () Pt has had class  (x) Pt has not had class        (x) Pt is progressing  () Pt is not progressing Exercise Goals   Reassessment      () Pt is coming regularly  () Pt is sporadic    () Pt has had class  () Pt has not had class        () Pt is progressing  () Pt is not progressing   Exercise Goals Discharge      () GOAL Met?  () GOAL not met -      () Pt had EDUCATION class  Date:   () Pt did not have class    () GOAL Met  See 6MWD above  () GOAL not Met:     Exercise Intervention Initial Assessment      () Transportation needed        (x) EDUCATION needed          (x) Motivation needed   Exercise Intervention/ Education   Plan      () [facility] set up        (x) EDUCATION:  Home activity class to be given        (x) Positive encouragement, support and motivation to be given   Exercise Intervention/ Education Reassessment      (x) Pt is attending  () Pt is not attending      () Pt has had class  (x) Pt has not had class      (x) Pt is progressing  () Pt not progressing Exercise Intervention/ Education Reassessment      (x) Pt is attending  () Pt is not attending      () Pt has had class  (x) Pt has not had class      (x) Pt is progressing  () Pt not progressing Exercise Intervention/ Education Reassessment      () Pt is attending  () Pt is not attending      () Pt has had class  () Pt has not had class      () Pt is progressing  () Pt not progressing Exercise Intervention/ Edcuation Discharge      () Pt attended most  () Pt cancelled a lot    () Pt has had class  Date: See above  () Pt did not have class    () Pt progressed and did well  () Pt had difficulty-               NUTRITION   INITIAL ASSESSMENT      Height:  511   Weight: 228.8 lbs  BMI: 31.9    30 day goal: 227 Lbs (2-4lbs)    (x) Overweight  () Underweight  () Normal  () Teeth issues  () GI issues  (x) Nutrition knowledge needed  (x) DM   NUTRITION   PLAN        Current Weight:  228.8lbs      Goal achieved?: no  Next 30 day goal: 227 Lbs   NUTRITION  RE ASSESSMENT      Current Weight: 228.8 lbs      Goal achieved?: no  Next 30 day goal: 227 Lbs NUTRITION  RE ASSESSMENT      Current Weight: 228.8 lbs      Goal achieved?: no  Next 30 day goal: 227 Lbs NUTRITION  RE ASSESSMENT      Current Weight:  lbs      Goal achieved?:  Next 30 day goal: Lbs   NUTRITION DISCHARGE        Current Weight:  lbs  BMI:    Goal achieved?:     Nutrition Goals  Initial Assessment    (x) Pt is DM.   Increase nutrition knowledge and glucose control through diet and exercise      (x) Learn weight strategies to lose weight        (x) Learn about general nutrition          -Achievable weight goal for the end of the program:  217 Lbs (2-4lbs per month recommended)   Nutrition Goals   Plan      (x) DM:  Attend nutrition class and learn about quality carbohydrates and exercises role in DM    (x) Attend nutrition class          (x) Attend nutrition class          (x) Initiate exercise and give pt hints and tips for weight and will have class for information   Nutrition Goals  Reassessment      () DM: Pt has had class  (x) DM: Pt has not had class           () Pt has had class  (x) Pt has not had class       () Pt has had class  (x) Pt has not had class       (x) Pt progressing  () Pt not progressing     Nutrition Goals  Reassessment      () DM: Pt has had class  (x) DM: Pt has not had class           () Pt has had class  (x) Pt has not had class       () Pt has had class  (x) Pt has not had class       (x) Pt progressing  () Pt not progressing Nutrition Goals  Reassessment      () DM: Pt has had class  () DM: Pt has not had class           () Pt has had class  () Pt has not had class       () Pt has had class  () Pt has not had class       () Pt progressing  () Pt not progressing     Nutrition Goals  Discharge      () Pt had nutrition class  Date:  () Pt has not had class        () Pt has had class  Date: See above  () Pt has not had class    () Pt has class  Date: See above  () Pt has not had class      () Final weight goal achieved  () Pt did not reach desired weight goal - (readdressed nutrition and exercise strategies for future goal attainment)        Nutrition Intervention/ Education   Initial Assessment    (x) Pt needs Nutrition education class        () Pt states does not need class       Nutrition Intervention/ Education  Plan      (x) Pt will have Nutrition class          (x) Encourage pt to continue to learn and incorporate self knowledge in to diet choices   Nutrition Intervention/ Education  Reassessment      () Pt has had class  (x) Pt has not had class      () Pt had questions  (x) Pt denies having questions Nutrition Intervention/ Education  Reassessment      () Pt has had class  (x) Pt has not had class      () Pt had questions  (x) Pt denies having questions Nutrition Intervention/ Education  Reassessment      () Pt has had class  () Pt has not had class      () Pt had questions  () Pt denies having questions Nutrition Intervention/ Education   Discharge      () Pt has had class  Date: See above  () Pt has not had class - Reason:    () Pt had questions that were answered   () Pt denies having questions             PSYCHO SOCIAL INITIAL ASSESSMENT      Depression:  () Self Reported  () In History  () S/Sx noted  (x) Denies  () On Medication  () Follows physician      (x) Give PHQ-9 Depression screening tool                  Dyspnea:  (x) Give pt UCSD SOB survey      (x) Pt gets SOB during activity and ADLs.           (x) Pt has support from home for the program        () Pt does not have support for the program   PSYCHO SOCIAL  PLAN      (x) Attend Stress/ Depression/ Anxiety Class                Pre Rehab PHQ-9   Score: 4  1-4 Normal  5-9 Mild  10-14 Mod  15-19 Mod-Sev  >19 Severe        Pre Rehab UCSD SOB Score: 42      (x) Attend classes and attend rehab to increase strength and endurance      -Attend Psychosocial class          () Speak with the patient/ family about ability to commit to the program with undue stress/ anxiety   PSYCHO SOCIAL  RE ASSESSMENT    () Pt scored >10 on PHQ-9.   Spoke with pt privately about issues and *             () Pt recommended to contact physician for assistance                See below for ADLs        () Pt has had class  (x) Pt has not had class        Re assessment of support:  Good, pt has been attending PSYCHO SOCIAL  RE ASSESSMENT    (x) Pt is coping well  () Pt needs more assistance-              () Pt recommended to contact physician for assistance                See below          (x) Pt has had class  () Pt has not had           Re assessment of support:  Good, pt has been attending PSYCHO SOCIAL  RE ASSESSMENT    () Pt is coping well  () Pt needs more assistance-              () Pt recommended to contact physician for assistance                See below          () Pt has had class  () Pt has not had class        Re assessment of support:  Good, pt has been attending   PSYCHO SOCIAL DISCHARGE      (x) Pt attended class     Date:              Post Rehab PHQ-9   Depression Score:                Post Rehab UCSD SOB  Score:      (x) Pt is less SOB with ADLs            () Pt had class  Date: See above  () Pt did not have class        () Pt completed program  () Pt had to stop         Psychosocial Goals   Initial Assessment    (x) Maintain/ Decrease Depression Levels      (x) Maintain/ Decrease Anxiety Levels        (x) Learn about Emotional Health          (x) Increase QoL   (CAT tool)          (x) Give the CAT tool for HR QoL      Pre Rehab  CAT score:  9/ 40       Psychosocial Goals  Plan      (x) Attend Stress/ Anxiety/ Dyspnea - Panic control class      (x) Attend Stress/ Anxiety/ Dyspnea - Panic control class      (x) Attend Stress/ Anxiety/ Dyspnea - Panic control class      (x) Initiate ND, get stronger, more endurance and more confidence              30 day CAT score:  NA / 40     Psychosocial Goals  Reassessment      () Pt has had class  (x) Pt has not had class      () Pt has had class  (x) Pt has not had class      () Pt has had class  (x) Pt has not had class      (x) Pt is progressing  () Pt is not progressing              60 day CAT score:  15 / 40 Psychosocial Goals  Reassessment      (x) Pt has had class  () Pt has not had class      (x) Pt has had class  () Pt has not had class      (x) Pt has had class  () Pt has not had class      (x) Pt is progressing  () Pt is not progressing              90 day CAT score:  na Psychosocial Goals  Reassessment      () Pt has had class  () Pt has not had class      () Pt has had class  () Pt has not had class      () Pt has had class  () Pt has not had class      () Pt is progressing  () Pt is not progressing              120 day CAT score:  / 40   Psychosocial Goals   Discharge      (x) Pt had class            (x) Pt had class            (x) Pt had class  Date: See above  () Pt did not have class      Post rehab CAT below                  Post Rehab   CAT score:  / 40              OUTCOMES    PHQ-9:  Did pt drop a severity level or maintain in the minimal range?  () Y  () N    UCSD SOB  Did pt decrease their number by 5 or more?  () Y  () N    CAT scores:  Did pt drop by 2 or more points from Pre to Post?  () Y  () N        Psychosocial Intervention/ Education   Initial Assessment    () Pt is being treated for depression/ anxiety        (x) Pt would benefit from [facility]s Psychosocial Issues Class (Stress, Depression, Anxiety, and Intimacy Psychosocial  Intervention/ Education  Plan      (x) Pt to contact physician if any severe changes occur in mood        (x) Pt will attend [facility]s class   Psychosocial Intervention/ Education Reassessment      (x) Pt is coping well  () Pt is not coping well         () Pt has had class  (x) Pt has not had class Psychosocial Intervention/ Education Reassessment      (x) Pt is coping well  () Pt is not coping well         (x) Pt has had class  () Pt has not had class Psychosocial Intervention/ Education Reassessment      () Pt is coping well  () Pt is not coping well         () Pt has had class  () Pt has not had class   Psychosocial Intervention/ Education Discharge      () Pt did not need any changes   () Pt needed changes to treatment      (x) Pt had class  Date: See above  () Pt did not have class         Pain   Initial Assessment    (x) N/A  Location:   Duration:   Intensity:  /10  Type:    Pain   Plan      (x) N/A    (x) Pts pain is under control  () Pt encouraged to contact physician for pain control   Pain   Reassessment      (x) N/A    (x) Pts pain is under control  () Pt encouraged to contact physician for pain control Pain   Reassessment      (x) N/A    (x) Pts pain is under control  () Pt encouraged to contact physician for pain control Pain   Reassessment      () N/A    () Pts pain is under control  () Pt encouraged to contact physician for pain control Pain   Discharge      () N/A    () Pts pain is under control  () Pt encouraged to contact physician for pain control           OXYGEN   INITIAL ASSESSMENT    RESTING:  () RA  (x) O2: 1 L/min  (x) Continuous  () Breath Actuated  97% SpO2 / 16 bpm    Prescribed Oxygen Use:  1 L/min at Rest and 3L/min with exertion    DME Company for O2:  [facility]     OXYGEN   PLAN      RESTING:  (x) Monitor needs, administer supplemental oxygen if SpO2 <88% OXYGEN   RE ASSESSMENT    RESTING:  (x) Pt SpO2 >87%  () Pt needs higher flow  () Pt needs less flow OXYGEN   RE ASSESSMENT    RESTING:  (x) Pt SpO2 >87%  () Pt needs higher flow  () Pt needs less flow OXYGEN   RE ASSESSMENT    RESTING:  (x) Pt SpO2 >87%  () Pt needs higher flow  () Pt needs less flow OXYGEN   DISCHARGE      RESTING:  () Pt SpO2 remained >87% on * L/min at rest    () Pts doctor and company contacted for change in Rx   Oxygen Goals   Initial Assessment    (x) Wean, Titrate down, or get off O2 if possible   Oxygen Goals   Plan      (x) Closely monitor SpO2 and HR using pulse oximetry for saturation and HR response, and titrate if appropriate   Oxygen Goals  Reassessment    () RA    On exertion:  (x) Pt maintaining FiO2  () Pt tolerating lower O2 needs  () Pt needing more O2   Oxygen Goals  Reassessment    () RA    On exertion:  (x) Pt maintaining FiO2  () Pt tolerating lower O2 needs  () Pt needing more O2 Oxygen Goals  Reassessment    () RA    On exertion:  () Pt maintaining FiO2  () Pt tolerating lower O2 needs  () Pt needing more O2     Oxygen Goals   Discharge    () RA    With Exertion:  () Pt maintained FiO2  () Pt was found to need less O2 - notification sent to physician  () Pt was found to need more O2, notification sent to physician     Oxygen Intervention/ Education  Initial Assessment    (x) Learn / Review about O2 use, safety and travel      () Pt does not wear or have home oxygen    Oxygen Intervention/ Education  Plan      (x) Attend O2 Use, safety and travel class        (x) Assess for SpO2 with each exercise   Oxygen Intervention/ Education  Reassessment      (x) Pt has had class  () Pt has not had class      (x) Pt is >87%  () Pt has been found to desaturate - physician notified Oxygen Intervention/ Education  Reassessment      (x) Pt has had class  () Pt has not had class      (x) Pt is >87%  () Pt has been found to desaturate - physician notified Oxygen Intervention/ Education  Reassessment      () Pt has had class  () Pt has not had class      () Pt is >87%  () Pt has been found to desaturate - physician notified   Oxygen Intervention/ Education  Discharge      () Pt had class  Date:   () Pt did not have class      () Pt did well  () Pt needed to be put on oxygen           TOBACCO USE  INITIAL ASSESSMENT    (x) Pt currently not smoking    () Pt currently smoking        () Pt needs Tobacco Cessation counseling          Smoked 2 ppd for 24 years  Pt quit in 1990.    TOBACCO USE  PLAN      (x) N/A      Does pt want to quit:   Does pt have a quit date:    () Tobacco Cessation counseling Education if applicable        () Encouraged to contact physician for tools/ nicotine replacement etc.   TOBACCO USE  RE ASSESSMENT    (x) N/A      Any change in Tobacco use status (x) N  ()Y      () Pt attended counseling education session            () Pt has contacted physician TOBACCO USE  RE ASSESSMENT    (x) N/A      Any change in Tobacco use status (x) N  ()Y      () Pt attended counseling education session            () Pt has contacted physician TOBACCO USE  RE ASSESSMENT    () N/A      Any change in Tobacco use status () N  ()Y      () Pt attended counseling education session            () Pt has contacted physician TOBACCO USE  DISCHARGE            Current Tobacco Use Status:         () Pt attended [facility] counseling education session  Date:           () Pt contacted physician        NRT product/ medication  :     Tobacco Use Goal  Initial Assessment      (x) Complete Cessation or Maintain Cessation of tobacco products   Tobacco Use Goal Intervention and Education Plan    (x Encourage pt to fight the urge, call quit line, use techniques learned from friends/ class    () Attend Tobacco Cessation class if needed   Tobacco Use  Reassessment          (x) Pt remains tobacco free            () Pt has had TC counseling session        () Pt still smoking Tobacco Use  Reassessment          (x) Pt remains tobacco free            () Pt has had TC counseling session        () Pt still smoking Tobacco Medication Goals  Re assessment          (x) Readdressed questions on any medications    (x) Pt has had class  () Pt has not had class Medication Goals  Re assessment          () Readdressed questions on any medications    () Pt has had class  () Pt has not had class Medication Goals  Discharge          % proper med usage see above      () Pt had class  Date: See above  () Pt has not had class             ADL  INITIAL ASSESSMENT      (x) Impaired ADL ability  () Need for Assist Devices  (x) Generalized weakness, low functional capacity  (x) Stairs in house               ADL  PLAN        -Initiate AL, RT, and chair squats  -Breathing retraining, PLB, and pacing      -Encourage home activity               ADL  RE ASSESSMENT      (x) Pt is progressing  () Pt is not progressing        () Pt has initiated home activity  (x) Pt has not yet initiated  home activity-        (x) ADLs getting easier  () ADLs not getting easier   ADL  RE ASSESSMENT      (x) Pt is progressing  () Pt is not progressing        () Pt has initiated home activity  (x) Pt has not yet initiated  home activity- pt not here to follow up with    (x) ADLs getting easier  () ADLs not getting easier ADL  RE ASSESSMENT      () Pt is progressing  () Pt is not progressing        () Pt has initiated home activity  () Pt has not yet initiated  home activity-      () ADLs getting easier  () ADLs not getting easier ADL  DISCHARGE        () Pt showed strength and endurance gains  () Pt did not progress      () Pt doing recommended home activity  () Pt not doing home activitiy         ADL Goals   Initial Assessment      (x) Decrease SOB with ADLs              (x) Decreased SOB overall      ADL Goals Intervention/ Education Plan      -Initiate AL, RT and chair squats and increase pts strength and endurance        -Pacing, Breathing retraining       ADL Goals Reassessment        (x) ADLs getting easier  () ADLs not getting easier          (x) Pt states activities are getting easier/ able to go longer/ not get as SOB   ADL Goals Reassessment        (x) ADLs getting easier  () ADLs not getting easier          (x) Pt states activities are getting easier/ able to go longer/ not get as SOB ADL Goals Reassessment        () ADLs getting easier  () ADLs not getting easier          () Pt states activities are getting easier/ able to go longer/ not get as SOB   ADL Goals   Discharge        Goal achieved?  () Yes  () No            Goal achieved?  () Yes  () No          Outcomes:  See Health and Functioning from the CAT tool and Strength and Endurance from 6 MWD above             EXACERBAT'N PREVENTION & MANAGEMENT  INITIAL ASSESSMENT      Pt reports;  () 0 respiratory infections  (x) 1   () >2  (x) Hospitalized in last 12 months   EXACERBAT'N PREVENTION & MANAGEMENT  PLAN        Address via Disease Self Management and Exacerbation prevention class:    -Hydration for mucus    -Hand Hygiene          -Evaluation of Sputum    -When to call MD  -S/Sx to report  -Decrease exposure to irritants/ exacerbators    -Cleaning of respiratory equipment   EXACERBAT'N PREVENTION & MANAGEMENT  RE ASSESSMENT      (x) Pt has had class  () Pt has not had class        (x) Improved hydration    (x) Correct hand washing techs and alcohol gel usage    (x) Sputum assessment    (x) Ability to recognize exacerbation and when to call MD        (x) Cleaning of respiratory equipment EXACERBAT'N PREVENTION & MANAGEMENT  RE ASSESSMENT      (x) Pt has had class  () Pt has not had class        (x) Improved hydration    (x) Correct hand washing techs and alcohol gel usage    (x) Sputum assessment    (x) Ability to recognize exacerbation and when to call MD        (x) Cleaning of respiratory equipment EXACERBAT'N PREVENTION & MANAGEMENT  RE ASSESSMENT      () Pt has had class  () Pt has not had class        () Improved hydration    () Correct hand washing techs and alcohol gel usage    () Sputum assessment    () Ability to recognize exacerbation and when to call MD        () Cleaning of respiratory equipment [address]        () Pt has had class  Date:  () Pt did not have class              () Addressed questions and pt feels comfortable with hydration, hand washing, sputum assessment, exacerbation knowledge, and cleaning of equipment   Exacerbation Prevention & Management Goals  Initial Assessment      (x) Learn ways to stay healthy and not get admitted          (x) Increase knowledge of Lungs, Breathing, Exercise, Nutrition, Mood and controlling anxiety, and stopping the Dyspnea Cycle by attending classes   Exacerbation Prevention & Management Goals Intervention/ Education  Plan    -Attend class and demonstrate disease self management strategies      -Attend all appropriate classes                   Exacerbation Prevention & Management Goals  Reassessment        (x) Pt has had class  () Pt has not had class        () Pt has had all classes  (x) Pt has had some classes  () Pt has had little/ not staying for education Exacerbation Prevention & Management Goals  Reassessment        (x) Pt has had class  () Pt has not had class        () Pt has had all classes  (x) Pt has had some classes  () Pt has had little/ not staying for education Exacerbation Prevention & Management Goals  Reassessment        () Pt has had class  () Pt has not had class        () Pt has had all classes  () Pt has had most classes  () Pt has had some of the classes  () Pt has had little/ not staying for education Exacerbation Prevention & Management Goals  Discharge        () Pt had class  Date:  See above  () Pt did not have class        () Pt attended all relevant classes and all questions were answered                   PHYSICIAN INTERACTION    MD Initial Assessment Review    (x) Initial  Assessment Reviewed  (x) Treatment Plan and Goals support patient needs/ abilities                  Cosigned and dated below: PHYSICIAN MONICA MARRERO 30 day and Plan Review:      (x) I have seen the patient in rehab during this 30 day reassessment  (x) I agree with the plan  (x) Continue with progression and instruction  () Continue, but with the following changes:      Cosigned and dated below: Crissy Ewing MD 30 day Reassessment Review:    (x) I have seen the patient in rehab during this 30 day reassessment  (x) Continue with the current program  () Continue, but with the following changes:  () Discharge patient      Cosigned and dated below: Crissy Ewing MD 30 day Reassessment Review:    (x) I have seen the patient in rehab during this 30 day reassessment  (x) Continue with the current program  () Continue, but with the following changes:  () Discharge patient      Cosigned and dated below: Crissy Ewing MD 30 day Reassessment Review:    (x) I have seen the patient in rehab during this 30 day reassessment  (x) Continue with the current program  () Continue, but with the following changes:  () Discharge patient      Cosigned and dated below: Crissy Ewing MD 30 day   Discharge Review:    (x) Discharge patient                            Cosigned and dated below:     Cosigned by: Aly Gandhi MD at 2/17/2022 10:59 AM       Revision History    Date/Time User Provider Type Action   2/17/2022 10:59 AM Aly Gandhi MD Physician Cosign   2/15/2022 11:12 AM Jory Werner RCP Respiratory Therapist Sign     Cosigned by: Aly Gandhi MD at 3/23/2022  1:27 PM       Revision History    Date/Time User Provider Type Action   3/23/2022  1:27 PM Aly Gandhi MD Physician Cosign   3/15/2022 12:17 PM Angelica Samaniego Exercise Physiologist Sign     Cosigned by: Aly Gandhi MD at 5/10/2022  5:52 PM       Revision History    Date/Time User Provider Type Action   5/10/2022  5:52 PM Aly Gandhi MD Physician Cosign   5/10/2022 12:32 PM Bernardo Harris Exercise Physiologist Sign

## 2022-06-09 ENCOUNTER — HOSPITAL ENCOUNTER (OUTPATIENT)
Dept: CARDIAC REHAB | Age: 77
Setting detail: THERAPIES SERIES
Discharge: HOME | End: 2022-06-09
Payer: MEDICARE

## 2022-06-09 PROCEDURE — 94626 PHY/QHP OP PULM RHB W/MNTR: CPT

## 2022-06-14 ENCOUNTER — HOSPITAL ENCOUNTER (OUTPATIENT)
Dept: CARDIAC REHAB | Age: 77
Setting detail: THERAPIES SERIES
Discharge: HOME | End: 2022-06-14
Payer: MEDICARE

## 2022-06-14 PROCEDURE — 94626 PHY/QHP OP PULM RHB W/MNTR: CPT

## 2022-06-16 ENCOUNTER — HOSPITAL ENCOUNTER (OUTPATIENT)
Dept: CARDIAC REHAB | Age: 77
Setting detail: THERAPIES SERIES
Discharge: HOME | End: 2022-06-16
Payer: MEDICARE

## 2022-06-16 PROCEDURE — 94626 PHY/QHP OP PULM RHB W/MNTR: CPT

## 2022-06-20 ENCOUNTER — OFFICE VISIT (OUTPATIENT)
Dept: FAMILY MEDICINE CLINIC | Age: 77
End: 2022-06-20
Payer: MEDICARE

## 2022-06-20 VITALS
HEART RATE: 80 BPM | BODY MASS INDEX: 30.27 KG/M2 | TEMPERATURE: 99.3 F | WEIGHT: 217 LBS | OXYGEN SATURATION: 97 % | DIASTOLIC BLOOD PRESSURE: 64 MMHG | RESPIRATION RATE: 20 BRPM | SYSTOLIC BLOOD PRESSURE: 100 MMHG

## 2022-06-20 DIAGNOSIS — U07.1 COVID-19: Primary | ICD-10-CM

## 2022-06-20 DIAGNOSIS — J43.2 CENTRILOBULAR EMPHYSEMA (HCC): ICD-10-CM

## 2022-06-20 PROCEDURE — 1036F TOBACCO NON-USER: CPT | Performed by: FAMILY MEDICINE

## 2022-06-20 PROCEDURE — G8427 DOCREV CUR MEDS BY ELIG CLIN: HCPCS | Performed by: FAMILY MEDICINE

## 2022-06-20 PROCEDURE — 99213 OFFICE O/P EST LOW 20 MIN: CPT | Performed by: FAMILY MEDICINE

## 2022-06-20 PROCEDURE — 1124F ACP DISCUSS-NO DSCNMKR DOCD: CPT | Performed by: FAMILY MEDICINE

## 2022-06-20 PROCEDURE — 3023F SPIROM DOC REV: CPT | Performed by: FAMILY MEDICINE

## 2022-06-20 PROCEDURE — G8417 CALC BMI ABV UP PARAM F/U: HCPCS | Performed by: FAMILY MEDICINE

## 2022-06-20 ASSESSMENT — PATIENT HEALTH QUESTIONNAIRE - PHQ9
SUM OF ALL RESPONSES TO PHQ QUESTIONS 1-9: 0
SUM OF ALL RESPONSES TO PHQ QUESTIONS 1-9: 0
1. LITTLE INTEREST OR PLEASURE IN DOING THINGS: 0
2. FEELING DOWN, DEPRESSED OR HOPELESS: 0
SUM OF ALL RESPONSES TO PHQ QUESTIONS 1-9: 0
SUM OF ALL RESPONSES TO PHQ QUESTIONS 1-9: 0
SUM OF ALL RESPONSES TO PHQ9 QUESTIONS 1 & 2: 0

## 2022-06-21 ENCOUNTER — HOSPITAL ENCOUNTER (OUTPATIENT)
Dept: CARDIAC REHAB | Age: 77
Setting detail: THERAPIES SERIES
End: 2022-06-21
Payer: MEDICARE

## 2022-06-28 ENCOUNTER — HOSPITAL ENCOUNTER (OUTPATIENT)
Dept: CARDIAC REHAB | Age: 77
Setting detail: THERAPIES SERIES
Discharge: HOME | End: 2022-06-28
Payer: MEDICARE

## 2022-06-28 PROCEDURE — 94626 PHY/QHP OP PULM RHB W/MNTR: CPT

## 2022-06-28 ASSESSMENT — ENCOUNTER SYMPTOMS
DIARRHEA: 0
EYE PAIN: 0
SORE THROAT: 0
COUGH: 1
NAUSEA: 0
ABDOMINAL PAIN: 0
RHINORRHEA: 0
ABDOMINAL DISTENTION: 0
SHORTNESS OF BREATH: 1
WHEEZING: 1
SINUS PRESSURE: 0
CONSTIPATION: 0

## 2022-06-28 NOTE — PROGRESS NOTES
[facility] for COPD   INDIVIDUAL TREATMENT PLAN (ITP)     Name: Lonny Alex   :  1945  Acct Number: [[id number]]   AGE: 67 y.o. Diagnosis:  Severe COPD, which is GOLD Stage 3                                               PFT:  Post Bronchodilator FEV1/FVC: 65  FEV1: 37    Patient Goals:  (x) Breathe better  (x) Increase my endurance (x) Have more energy  (x) Rely less on others  (x) Ease ADLs  (x) Understand and use meds correctly  (x) Control cough  (x) Make fewer visits to hospital  () Quit smoking  (x) Feel less anxious / have more confidence    Glossary:  ND=Pulmonary Rehab  PF=Physical Fitness TM=Treadmill  AD=Schwinn Airdyne  UBE=UpperBody Ergometer  RT=Resistance Training  PLB=Pursed Lip Breathing      COVID -19 Screen  Do you have any of the following symptoms:  [] Fever [] Cough [] SOB [] Muscle/Body Ache [] Loss of taste/smell [x] None    Have you traveled outside of the [place]? [] Yes      [x] No    Have you been around anyone who has tested Positive for COVID 19? [x] Yes      [] No- pt just getting over COVID,  This is day #10 since his positive COVID test.    The following Education has been completed with patient. [x] Wait in the lobby until we call you back to rehab. [x] You must wear a mask while in the medical center and in pulmonary rehab unless exercising. Please bring your own. [x] Bring your own bottle of water with you. [x] You can not bring anyone with you in to the rehab clinic at this time. They are welcome to sit in the lobby or wait in the car.       INDIVIDUAL TREATMENT PLAN    INITIAL ASSESSMENT    Day #1 INDIVIDUAL TREATMENT PLAN    PLAN      Day #2-30 INDIVIDUAL TREATMENT PLAN    RE ASSESSMENT    Day #31-60 INDIVIDUAL TREATMENT PLAN    RE ASSESSMENT    Day #61-90  INDIVIDUAL TREATMENT PLAN    DISCHARGE      Last Day        Date: 2/15/2022     Date: 3/15/2022    Jes Pittman is not here today for reassessment Date: 5/10/22   Date: 6/7/22    Date: 6/28/2022   EXERCISE   INITIAL ASSESSMENT      Prescribed Oxygen Use  Activity: 3 L/min  (x) Continuous  () Breath Actuated      Oxygen Titration  (x) Assess for desaturation            Current Home Exercise:  None   EXERCISE  PLAN        Current Oxygen Use  Activity:  3L/min  (x) Continuous  () Breath Actuated      Oxygen Titration  (x) Maintaining >87% Spo2  () Not maintaining -  L/min needed on       Current Home Exercise:  unknown   EXERCISE  RE ASSESSMENT      Current Oxygen Use  Activity: 2 L/min  (x) Continuous  () Breath Actuated      Oxygen Titration  (x) Maintaining >87% Spo2  () Not maintaining -  L/min needed on       Current Home Exercise:  Frequency:  x/wk  Intensity:  /10  Duration:  min  Mode: EXERCISE  RE ASSESSMENT      Current Oxygen Use  Activity: 2 L/min  (x) Continuous  () Breath Actuated      Oxygen Titration  (x) Maintaining >87% Spo2  () Not maintaining -  L/min needed on       Current Home Exercise:  unknown  EXERCISE  DISCHARGE        Final Oxygen Use  Activity: 3 L/min  (x) Continuous  () Breath Actuated      Oxygen Titration  (x) Maintaining >87% Spo2  () Not maintaining -  L/min needed on       Current Home Exercise:  Frequency: 3x/wk  Intensity: 3 /10  Duration:15 min  Mode: walking and jumping jacks       6 Minute Walk Test (6MWT):  O2 used: 3LPM nasal cannula   960 ft walked = 2.3 METs  SpO2: 88-98%  HR:    RPD: 4  RPE: 6  Number of Breaks: 2   Avg time for break:  both breaks were approx 7 secs  Assist device used: none         6 Minute Walk Test (6MWT):  O2 used: 3LPM nasal cannula   ft walked = 960 feet = 2.4 METs  SpO2:  90-96%  HR:    RPD: 3  RPE:4  Number of Breaks: 1   Avg time for break:  15 secs  Assist device used: none            OUTCOMES:  6MWD Improvement:  > 98'?  () Yes  (x) No - this is pts first day back from having COVID     Exercise Prescription    THR: 71 - 114 bpm    Frequency:  2-3x/wk in [place], 1-3x/wk home activity    Intensity:  METs (from 6MWT)      Time:  15-30 total minutes up to 55 minutes max    Type:  Aerobic (TM, AD, UBE, NuStep), 6 ST, RT 1-10# 8-15 reps    Progression:  Increase 30s - 2 min/mode for Aerobic activity, and increase Intensity 2-5% each week if RPE <5, RPD <5, SpO2 >87% and pt is within [facility] above. Exercise Prescription    (x) Pt exercising within THR    Frequency:  2-3x/wk in [place], 1-3x/wk home activity    Intensity:  3-5 on Greta Dyspnea/ Fatigue scale    Time:  15-30 total minutes up to 55 minutes max    Type:  Aerobic (TM, AD, UBE, NuStep), 6 ST, RT 1-10# 8-15 reps    Progression:  Increase 30s - 2 min/mode for Aerobic activity, and increase Intensity 2-5% each week if RPE <5, RPD <5, SpO2 >87% and pt is within [facility] above. Exercise Prescription    (x) Pt exercising within THR    Frequency:  2-3x/wk in [place], 2-5x/wk home activity    Intensity:  3-5 on Greta Dyspnea/ Fatigue scale    Time:  30-45 total minutes up to 55 minutes max    Type:  Aerobic (TM, AD, UBE, NuStep), 6 ST, RT 1-10# 8-15 reps    Progression:  Increase 30s - 2 min/mode for Aerobic activity, and increase Intensity 2-5% each week if RPE <5, RPD <5, SpO2 >87% and pt is within [facility] above. Exercise Prescription    (x) Pt exercising within THR    Frequency:  2-3x/wk in [place], 2-5x/wk home activity    Intensity:  3-5 on Greta Dyspnea/ Fatigue scale    Time:  30-45 total minutes up to 55 minutes max    Type:  Aerobic (TM, AD, UBE, NuStep), 6 ST, RT 1-10# 8-15 reps    Progression:  Increase 30s - 2 min/mode for Aerobic activity up to 35 minutes, and increase Intensity 2-5% each week if RPE <5, RPD <5, SpO2 >87% and pt is within [facility] above. Home Program          (x) Given to patient with current levels, recommendation and guidelines.                                        Initial Levels:  TM:1.7mph,6min  AD:0.9level,6min  NuStep:48W, 6 min  UBE:0.4level, 5 min  RT 3#, 8-15 reps   Current Levels:  TM: 1.8 mph, 10 min  AD:  40watts, 8 min  UBE:  22watts, 5 min  RT 3 #, 8-15 reps   Current Levels:  TM: 1.8 mph, 12 min  AD: 40 level, 12 min  UBE: 28 schmidt, 5 min  RT  4#, 8-15 reps Current Levels:  TM: 1.8 mph, 15 min  NuStep: 48 W, 10 min  UBE: 30 level, 5 min  RT 4 #, 8-15 reps  Final Levels:  TM:1.8mph,15min  AD:40 W,12 min  UBE:32 W, 5 min  RT 4#, 8-15 reps     Physical Limitations  Initial Assessment    () Weakness  () Inflexible  () Poor posture  () Gait abnormality  () Balance  () Orthopedic Issues   Physical Limitation  Plan      (x) Strengthen through squats and RT  (x) Appropriate stretches shown  (x)Verbal cues and reminders for posture and gait given  (x) Proper modalities chosen for ortho issues  (x) Give Home activity / stretches/ Warmup/ [place] info   Physical Limitations Reassessment      (x) Pt progressing  () Pt not progressing Physical Limitations Reassessment      (x) Pt progressing  () Pt not progressing  Physical Limitations  Discharge      (x) Issues Addressed  () PT/OT suggested       Exercise Goals   Initial Assessment:    (x) Start to, and commit to exercising regularly     (x) Learn about home activity recommendations        (x) Increase PF shown by increasing 6MWD   Exercise Goals   Plan      -Initiate DC 2x/week  -Encourage home exercise    -Attend Home Activity EDUCATION class      -Slowly, safely, progress in DC    Exercise Goals   Reassessment      (x) Pt is coming regularly  () Pt is sporadic    () Pt has had class  () Pt has not had class        (x) Pt is progressing  () Pt is not progressing Exercise Goals   Reassessment      (x) Pt is coming regularly  () Pt is sporadic    () Pt has had class  (x) Pt has not had class        (x) Pt is progressing  () Pt is not progressing  Exercise Goals Discharge      () GOAL Met?  (x) GOAL not met - pt did 960 feet today, also did 960 feet on 1st day but this is pts first day back from having COVID      () Pt had EDUCATION class  Date:   (x) Pt did not have class    () GOAL Met  See 6MWD above  (x) GOAL not Met:     Exercise Intervention Initial Assessment      () Transportation needed        (x) EDUCATION needed          (x) Motivation needed   Exercise Intervention/ Education   Plan      () [facility] set up        (x) EDUCATION:  Home activity class to be given        (x) Positive encouragement, support and motivation to be given   Exercise Intervention/ Education Reassessment      (x) Pt is attending  () Pt is not attending      () Pt has had class  (x) Pt has not had class      (x) Pt is progressing  () Pt not progressing Exercise Intervention/ Education Reassessment      (x) Pt is attending  () Pt is not attending      () Pt has had class  (x) Pt has not had class      (x) Pt is progressing  () Pt not progressing  Exercise Intervention/ Edcuation Discharge      (x) Pt attended most  () Pt cancelled a lot    () Pt has had class  Date: See above  (x) Pt did not have class    (x) Pt progressed and did well  () Pt had difficulty-               NUTRITION   INITIAL ASSESSMENT      Height:  511   Weight: 228.8 lbs  BMI: 31.9    30 day goal: 227 Lbs (2-4lbs)    (x) Overweight  () Underweight  () Normal  () Teeth issues  () GI issues  (x) Nutrition knowledge needed  (x) DM   NUTRITION   PLAN        Current Weight:  228.8lbs      Goal achieved?: no  Next 30 day goal: 227 Lbs   NUTRITION  RE ASSESSMENT      Current Weight: 228.8 lbs      Goal achieved?: no  Next 30 day goal: 227 Lbs NUTRITION  RE ASSESSMENT      Current Weight: 228.8 lbs      Goal achieved?: no  Next 30 day goal: 227 Lbs  NUTRITION DISCHARGE        Current Weight:213.8  lbs  BMI:    Goal achieved?:yes     Nutrition Goals  Initial Assessment    (x) Pt is DM.   Increase nutrition knowledge and glucose control through diet and exercise      (x) Learn weight strategies to lose weight        (x) Learn about general nutrition          -Achievable weight goal for the end of the program:  217 Lbs (2-4lbs per month has not had class - Reason:pt not interested    () Pt had questions that were answered   (x) Pt denies having questions             PSYCHO SOCIAL INITIAL ASSESSMENT      Depression:  () Self Reported  () In History  () S/Sx noted  (x) Denies  () On Medication  () Follows physician      (x) Give PHQ-9 Depression screening tool                  Dyspnea:  (x) Give pt UCSD SOB survey      (x) Pt gets SOB during activity and ADLs. (x) Pt has support from home for the program        () Pt does not have support for the program   PSYCHO SOCIAL  PLAN      (x) Attend Stress/ Depression/ Anxiety Class                Pre Rehab PHQ-9   Score: 4  1-4 Normal  5-9 Mild  10-14 Mod  15-19 Mod-Sev  >19 Severe        Pre Rehab UCSD SOB Score: 42      (x) Attend classes and attend rehab to increase strength and endurance      -Attend Psychosocial class          () Speak with the patient/ family about ability to commit to the program with undue stress/ anxiety   PSYCHO SOCIAL  RE ASSESSMENT    () Pt scored >10 on PHQ-9.   Spoke with pt privately about issues and *             () Pt recommended to contact physician for assistance                See below for ADLs        () Pt has had class  (x) Pt has not had class        Re assessment of support:  Good, pt has been attending PSYCHO SOCIAL  RE ASSESSMENT    (x) Pt is coping well  () Pt needs more assistance-              () Pt recommended to contact physician for assistance                See below          (x) Pt has had class  () Pt has not had           Re assessment of support:  Good, pt has been attending  PSYCHO SOCIAL DISCHARGE      (x) Pt attended class     Date: 3/8/2022              Post Rehab PHQ-9   Depression Score:0                Post Rehab UCSD SOB  Score:17      (x) Pt is less SOB with ADLs            (x) Pt had class  Date: See above  () Pt did not have class        (x) Pt completed program  () Pt had to stop         Psychosocial Goals   Initial Assessment    (x) Maintain/ Decrease Depression Levels      (x) Maintain/ Decrease Anxiety Levels        (x) Learn about Emotional Health          (x) Increase QoL   (CAT tool)          (x) Give the CAT tool for HR QoL      Pre Rehab  CAT score:  9/ 40       Psychosocial Goals  Plan      (x) Attend Stress/ Anxiety/ Dyspnea - Panic control class      (x) Attend Stress/ Anxiety/ Dyspnea - Panic control class      (x) Attend Stress/ Anxiety/ Dyspnea - Panic control class      (x) Initiate OK, get stronger, more endurance and more confidence              30 day CAT score:  NA / 40     Psychosocial Goals  Reassessment      () Pt has had class  (x) Pt has not had class      () Pt has had class  (x) Pt has not had class      () Pt has had class  (x) Pt has not had class      (x) Pt is progressing  () Pt is not progressing              60 day CAT score:  15 / 40 Psychosocial Goals  Reassessment      (x) Pt has had class  () Pt has not had class      (x) Pt has had class  () Pt has not had class      (x) Pt has had class  () Pt has not had class      (x) Pt is progressing  () Pt is not progressing              90 day CAT score:  na  Psychosocial Goals   Discharge      (x) Pt had class            (x) Pt had class            (x) Pt had class  Date: See above  () Pt did not have class      Post rehab CAT below                  Post Rehab   CAT score:  9/ 40              OUTCOMES    PHQ-9:  Did pt drop a severity level or maintain in the minimal range?  (x) Y  () N    UCSD SOB  Did pt decrease their number by 5 or more?  (x) Y  () N    CAT scores:  Did pt drop by 2 or more points from Pre to Post?  () Y  (x) N        Psychosocial Intervention/ Education   Initial Assessment    () Pt is being treated for depression/ anxiety        (x) Pt would benefit from [facility]s Psychosocial Issues Class (Stress, Depression, Anxiety, and Intimacy   Psychosocial  Intervention/ Education  Plan      (x) Pt to contact physician if any severe changes occur in mood        (x) Pt will attend [facility]s class   Psychosocial Intervention/ Education Reassessment      (x) Pt is coping well  () Pt is not coping well         () Pt has had class  (x) Pt has not had class Psychosocial Intervention/ Education Reassessment      (x) Pt is coping well  () Pt is not coping well         (x) Pt has had class  () Pt has not had class  Psychosocial Intervention/ Education Discharge      (x) Pt did not need any changes   () Pt needed changes to treatment      (x) Pt had class  Date: See above  () Pt did not have class         Pain   Initial Assessment    (x) N/A  Location:   Duration:   Intensity:  /10  Type:    Pain   Plan      (x) N/A    (x) Pts pain is under control  () Pt encouraged to contact physician for pain control   Pain   Reassessment      (x) N/A    (x) Pts pain is under control  () Pt encouraged to contact physician for pain control Pain   Reassessment      (x) N/A    (x) Pts pain is under control  () Pt encouraged to contact physician for pain control  Pain   Discharge      (x) N/A    () Pts pain is under control  () Pt encouraged to contact physician for pain control           OXYGEN   INITIAL ASSESSMENT    RESTING:  () RA  (x) O2: 1 L/min  (x) Continuous  () Breath Actuated  97% SpO2 / 16 bpm    Prescribed Oxygen Use:  1 L/min at Rest and 3L/min with exertion    DME Company for O2:  [facility]     OXYGEN   PLAN      RESTING:  (x) Monitor needs, administer supplemental oxygen if SpO2 <88% OXYGEN   RE ASSESSMENT    RESTING:  (x) Pt SpO2 >87%  () Pt needs higher flow  () Pt needs less flow OXYGEN   RE ASSESSMENT    RESTING:  (x) Pt SpO2 >87%  () Pt needs higher flow  () Pt needs less flow  OXYGEN   DISCHARGE      RESTING:  (x) Pt SpO2 remained >87% on 3 L/min at rest    () Pts doctor and company contacted for change in Rx   Oxygen Goals   Initial Assessment    (x) Wean, Titrate down, or get off O2 if possible   Oxygen Goals   Plan      (x) Closely monitor SpO2 and HR using pulse oximetry for saturation and HR response, and titrate if appropriate   Oxygen Goals  Reassessment    () RA    On exertion:  (x) Pt maintaining FiO2  () Pt tolerating lower O2 needs  () Pt needing more O2   Oxygen Goals  Reassessment    () RA    On exertion:  (x) Pt maintaining FiO2  () Pt tolerating lower O2 needs  () Pt needing more O2    Oxygen Goals   Discharge    () RA    With Exertion:  (x) Pt maintained FiO2  () Pt was found to need less O2 - notification sent to physician  () Pt was found to need more O2, notification sent to physician     Oxygen Intervention/ Education  Initial Assessment    (x) Learn / Review about O2 use, safety and travel      () Pt does not wear or have home oxygen    Oxygen Intervention/ Education  Plan      (x) Attend O2 Use, safety and travel class        (x) Assess for SpO2 with each exercise   Oxygen Intervention/ Education  Reassessment      (x) Pt has had class  () Pt has not had class      (x) Pt is >87%  () Pt has been found to desaturate - physician notified Oxygen Intervention/ Education  Reassessment      (x) Pt has had class  () Pt has not had class      (x) Pt is >87%  () Pt has been found to desaturate - physician notified    Oxygen Intervention/ Education  Discharge      (x) Pt had class  Date: 4/5/2022  () Pt did not have class      (x) Pt did well  () Pt needed to be put on oxygen           TOBACCO USE  INITIAL ASSESSMENT    (x) Pt currently not smoking    () Pt currently smoking        () Pt needs Tobacco Cessation counseling          Smoked 2 ppd for 24 years  Pt quit in 1990.    TOBACCO USE  PLAN      (x) N/A      Does pt want to quit:   Does pt have a quit date:    () Tobacco Cessation counseling Education if applicable        () Encouraged to contact physician for tools/ nicotine replacement etc.   TOBACCO USE  RE ASSESSMENT    (x) N/A      Any change in Tobacco use status (x) N  ()Y      () Pt attended counseling education session            () Pt has contacted physician TOBACCO USE  RE ASSESSMENT    (x) N/A      Any change in Tobacco use status (x) N  ()Y      () Pt attended counseling education session            () Pt has contacted physician  TOBACCO USE  DISCHARGE            Current Tobacco Use Status: non smoker        () Pt attended TC counseling education session  Date:           () Pt contacted physician        NRT product/ medication  :     Tobacco Use Goal  Initial Assessment      (x) Complete Cessation or Maintain Cessation of tobacco products   Tobacco Use Goal Intervention and Education Plan    (x Encourage pt to fight the urge, call quit line, use techniques learned from friends/ class    () Attend Tobacco Cessation class if needed   Tobacco Use  Reassessment          (x) Pt remains tobacco free            () Pt has had TC counseling session        () Pt still smoking Tobacco Use  Reassessment          (x) Pt remains tobacco free            () Pt has had TC counseling session        () Pt still smoking  Tobacco Use  Discharge          (x) Pt remains tobacco free            () Pt had TC counseling  Date:  See above        () Pt still smoking - gave resources for quitting             MEDICATIONS  (Oral & Inhaled)  INITIAL ASSESSMENT    Pt reports taking his meds properly 100% of the time      (x) Pt is on inhaled medications   MEDICATIONS  PLAN        -Review Rxs purpose, schedule, side effects and importance of compliance during Medication class. Also address Cpap, Vents.        MEDICATIONS  RE ASSESSMENT      Pt reports taking their meds properly 100 % of the time        (x) Pt has had class  () Pt has not had class MEDICATIONS  RE ASSESSMENT      Pt reports taking their meds properly  % of the time    n/a    (x) Pt has had class  () Pt has not had class          MEDICATIONS  DISCHARGE        Pt reports taking their meds properly 100% of the time        (x)Pt had med class  Date: 3/1/2022  () Pt has not had class       Pt has:  (x) Metered Dose Inhaler  (x) Dry Powder Inhaler  (x) Nebulizer   -Review correct use, timing, technique and cleaning of equipment during Medication class (x) Pt has had class  () Pt has not had class (x) Pt has had class  () Pt has not had class  (x) Pt had class  Date: See above  () Pt has not had class   Medication Goals  Initial Assessment        (x) Take meds properly 100% of the time    (x) Learn about / Review Rxs, and devices Medication Goals  Intervention & Education  Plan      -Follow Rx instructions and ask if questions    -Attend Medication Education class   Medication Goals  Re assessment          (x) Readdressed questions on any medications    (x) Pt has had class  () Pt has not had class Medication Goals  Re assessment          (x) Readdressed questions on any medications    (x) Pt has had class  () Pt has not had class  Medication Goals  Discharge          100% proper med usage see above      (x) Pt had class  Date: See above  () Pt has not had class             ADL  INITIAL ASSESSMENT      (x) Impaired ADL ability  () Need for Assist Devices  (x) Generalized weakness, low functional capacity  (x) Stairs in house               ADL  PLAN        -Initiate NC, RT, and chair squats  -Breathing retraining, PLB, and pacing      -Encourage home activity               ADL  RE ASSESSMENT      (x) Pt is progressing  () Pt is not progressing        () Pt has initiated home activity  (x) Pt has not yet initiated  home activity-        (x) ADLs getting easier  () ADLs not getting easier   ADL  RE ASSESSMENT      (x) Pt is progressing  () Pt is not progressing        () Pt has initiated home activity  (x) Pt has not yet initiated  home activity- pt not here to follow up with    (x) ADLs getting easier  () ADLs not getting easier  ADL  DISCHARGE        (x) Pt showed strength and endurance gains  () Pt did not progress      (x) Pt doing recommended home activity  () Pt not doing home activitiy         ADL Goals   Initial Assessment      (x) Decrease SOB with ADLs              (x) Decreased SOB overall      ADL Goals Intervention/ Education Plan      -Initiate ME, RT and chair squats and increase pts strength and endurance        -Pacing, Breathing retraining       ADL Goals Reassessment        (x) ADLs getting easier  () ADLs not getting easier          (x) Pt states activities are getting easier/ able to go longer/ not get as SOB   ADL Goals Reassessment        (x) ADLs getting easier  () ADLs not getting easier          (x) Pt states activities are getting easier/ able to go longer/ not get as SOB  ADL Goals   Discharge        Goal achieved? (x) Yes  () No            Goal achieved?   (x) Yes  () No          Outcomes:  See Health and Functioning from the CAT tool and Strength and Endurance from 6 MWD above             EXACERBAT'N PREVENTION & MANAGEMENT  INITIAL ASSESSMENT      Pt reports;  () 0 respiratory infections  (x) 1   () >2  (x) Hospitalized in last 12 months   EXACERBAT'N PREVENTION & MANAGEMENT  PLAN        Address via Disease Self Management and Exacerbation prevention class:    -Hydration for mucus    -Hand Hygiene          -Evaluation of Sputum    -When to call MD  -S/Sx to report  -Decrease exposure to irritants/ exacerbators    -Cleaning of respiratory equipment   EXACERBAT'N PREVENTION & MANAGEMENT  RE ASSESSMENT      (x) Pt has had class  () Pt has not had class        (x) Improved hydration    (x) Correct hand washing techs and alcohol gel usage    (x) Sputum assessment    (x) Ability to recognize exacerbation and when to call MD        (x) Cleaning of respiratory equipment EXACERBAT'N PREVENTION & MANAGEMENT  RE ASSESSMENT      (x) Pt has had class  () Pt has not had class        (x) Improved hydration    (x) Correct hand washing techs and alcohol gel usage    (x) Sputum assessment    (x) Ability to recognize exacerbation and when to call MD        (x) Cleaning of respiratory equipment          EXACERBAT'N PREVENTION & MANAGEMENT  DISCHARGE        () Pt has had class  Date: 2/22/2022  () Pt did not have class              (x) Addressed questions and pt feels comfortable with hydration, hand washing, sputum assessment, exacerbation knowledge, and cleaning of equipment   Exacerbation Prevention & Management Goals  Initial Assessment      (x) Learn ways to stay healthy and not get admitted          (x) Increase knowledge of Lungs, Breathing, Exercise, Nutrition, Mood and controlling anxiety, and stopping the Dyspnea Cycle by attending classes   Exacerbation Prevention & Management Goals Intervention/ Education  Plan    -Attend class and demonstrate disease self management strategies      -Attend all appropriate classes                   Exacerbation Prevention & Management Goals  Reassessment        (x) Pt has had class  () Pt has not had class        () Pt has had all classes  (x) Pt has had some classes  () Pt has had little/ not staying for education Exacerbation Prevention & Management Goals  Reassessment        (x) Pt has had class  () Pt has not had class        () Pt has had all classes  (x) Pt has had some classes  () Pt has had little/ not staying for education          Exacerbation Prevention & Management Goals  Discharge        (x) Pt had class  Date:  See above  () Pt did not have class        (x) Pt attended all relevant classes and all questions were answered                   PHYSICIAN INTERACTION    MD Initial Assessment Review    (x) Initial  Assessment Reviewed  (x) Treatment Plan and Goals support patient needs/ abilities                  Cosigned and dated below:   PHYSICIAN INTERACTION    MD 30 day and Plan Review:      (x) I have seen the patient in rehab during this 30 day reassessment  (x) I agree with the plan  (x) Continue with progression and instruction  () Continue, but with the following changes:      Cosigned and dated below: PHYSICIAN INTERACTION    MD 30 day Reassessment Review:    (x) I have seen the patient in rehab during this 30 day reassessment  (x) Continue with the current program  () Continue, but with the following changes:  () Discharge patient      Cosigned and dated below: Shilpa Thompson MD 30 day Reassessment Review:    (x) I have seen the patient in rehab during this 30 day reassessment  (x) Continue with the current program  () Continue, but with the following changes:  () Discharge patient      Cosigned and dated below:  Shilpa Thompson MD 30 day   Discharge Review:    (x) Discharge patient                            Cosigned and dated below:     Cosigned by: Alaina Cooney MD at 2/17/2022 10:59 AM       Revision History    Date/Time User Provider Type Action   2/17/2022 10:59 AM Alaina Cooney MD Physician Cosign   2/15/2022 11:12 AM Ger Belle RCP Respiratory Therapist Sign     Cosigned by: Alaina Cooney MD at 3/23/2022  1:27 PM       Revision History    Date/Time User Provider Type Action   3/23/2022  1:27 PM Alaina Cooney MD Physician Cosign   3/15/2022 12:17 PM Gayathri Dunn Exercise Physiologist Sign     Cosigned by: Alaina Cooney MD at 5/10/2022  5:52 PM       Revision History    Date/Time User Provider Type Action   5/10/2022  5:52 PM Alaina Cooney MD Physician Cosign   5/10/2022 12:32 PM Felicity England Exercise Physiologist Sign     Cosigned by: Alaina Cooney MD at 2/17/2022 10:59 AM         Revision History     Date/Time User Provider Type Action   2/17/2022 10:59 AM Alaina Cooney MD Physician Cosign   2/15/2022 11:12 AM Ger Belle RCP Respiratory Therapist Sign      Cosigned by: Alaina Cooney MD at 3/23/2022  1:27 PM         Revision History     Date/Time User Provider Type Action   3/23/2022  1:27 PM Alaina Cooney MD Physician Cosign   3/15/2022 12:17 PM Gayathri Dunn Exercise Physiologist Sign      Cosigned by: Aalina Cooney MD at 5/10/2022  5:52 PM         Revision History     Date/Time User Provider Type Action 5/10/2022  5:52 PM Jeanine Funes MD Physician Cosign   5/10/2022 12:32 PM Rufino Lyles Exercise Physiologist Sign                        Cosigned by: Jeanine Funes MD at 6/8/2022  2:07 PM       Revision History    Date/Time User Provider Type Action   6/8/2022  2:07 PM Jeanine Funes MD Physician Cosign   6/7/2022  1:24 PM Rufino Lyles Exercise Physiologist Sign

## 2022-06-28 NOTE — PROGRESS NOTES
[address] [place] [facility] [place] [address]  Dept: [phone]  Dept Fax: [phone]  Loc: [phone]  PROGRESS NOTE      VisitDate: 6/20/2022    Paola Torres is a 67 y.o. male who presents today for:     Chief Complaint   Patient presents with    Cough     wheezing, sob with exertion-on 2L, fatigue, sx started 6/18, worse yesterday. Took COVID after scheduled appt today and was positive. Subjective:  HPI  Patient is with cough wheezing shortness of breath congestion. He is on 2 L of oxygen feeling very tired I will muscle aches. Home COVID test was positive    Review of Systems   Constitutional: Positive for fatigue and fever. Negative for appetite change. HENT: Negative for congestion, ear pain, postnasal drip, rhinorrhea, sinus pressure and sore throat. Eyes: Negative for pain and visual disturbance. Respiratory: Positive for cough, shortness of breath and wheezing. Cardiovascular: Negative for chest pain. Gastrointestinal: Negative for abdominal distention, abdominal pain, constipation, diarrhea and nausea. Genitourinary: Negative for dysuria, frequency and urgency. Musculoskeletal: Negative for arthralgias. Skin: Negative for rash. Neurological: Negative for dizziness. Past Medical History:   Diagnosis Date    Arthritis     Cancer ([facility] [address] 1990    Spelenectomy, cholycystectomy,partial pancrease, partial stomache, lymphnodes    COPD (chronic obstructive pulmonary disease) (HCC)     Hypertension     Nausea & vomiting     Type II or unspecified type diabetes mellitus without mention of complication, not stated as uncontrolled       Past Surgical History:   Procedure Laterality Date    CHOLECYSTECTOMY      COLONOSCOPY  3/16/12    [facility]    CYST REMOVAL      FROM NECK AND BACK    HERNIA REPAIR      OTHER SURGICAL HISTORY  1/25/2013    RIGHT POST.  CERVICAL LAMINOTOMIES AND FORAMINOTOMIES   Mimi Fontenot PANCREAS SURGERY      partial removal     PARATHYROID GLAND SURGERY      PILONIDAL CYST EXCISION      SPLENECTOMY, TOTAL       Family History   Problem Relation Age of Onset    Cancer Father         zollinger shay syndrome    Cancer Son         carcinoid cancer     Social History     Tobacco Use    Smoking status: Former Smoker     Packs/day: 2.00     Years: 20.00     Pack years: 40.00     Types: Cigarettes     Quit date: 1991     Years since quittin.1    Smokeless tobacco: Never Used   Substance Use Topics    Alcohol use: Yes     Alcohol/week: 0.0 standard drinks     Comment: rarely      Current Outpatient Medications   Medication Sig Dispense Refill    tiotropium (SPIRIVA RESPIMAT) 2.5 MCG/ACT AERS inhaler Inhale 2 puffs into the lungs daily 3 each 1    ADVAIR DISKUS 250-50 MCG/DOSE AEPB USE 1 INHALATION TWICE A  each 3    metFORMIN (GLUCOPHAGE) 500 MG tablet Take 1 tablet by mouth 2 times daily (with meals) HOLD 48 HRS PRIOR TO SURGERY 60 tablet 5    albuterol (PROVENTIL) (2.5 MG/3ML) 0.083% nebulizer solution Take 3 mLs by nebulization every 6 hours as needed for Wheezing or Shortness of Breath 120 each 3    BD INSULIN SYRINGE U/F 31G X \" 0.5 ML MISC USE AS DIRECTED UNDER THE SKIN TWICE DAILY      atorvastatin (LIPITOR) 10 MG tablet Take 1 tablet by mouth nightly       doxazosin (CARDURA) 8 MG tablet Take 1 tablet by mouth nightly       albuterol sulfate (PROAIR RESPICLICK) 194 (90 Base) MCG/ACT aerosol powder inhalation Inhale 2 puffs into the lungs every 6 hours as needed for Wheezing or Shortness of Breath 3 Inhaler 3    OXYGEN Inhale into the lungs 1 L at rest and 3 L with activity      sildenafil (VIAGRA) 100 MG tablet Take 100 mg by mouth as needed for Erectile Dysfunction       TRAVATAN Z 0.004 % SOLN ophthalmic solution Place 1 drop into both eyes nightly.       Insulin Lispro Prot & Lispro (HUMALOG MIX 75/25 SC) Inject 30 Units into the skin 2 times daily  quinapril (ACCUPRIL) 20 MG tablet Take 20 mg by mouth 2 times daily.  esomeprazole (NEXIUM) 40 MG capsule Take 40 mg by mouth 2 times daily. No current facility-administered medications for this visit. Allergies   Allergen Reactions    Propofol Anaphylaxis    Bee Venom      Health Maintenance   Topic Date Due    Annual Wellness Visit (AWV)  Never done    Meningococcal (ACWY) vaccine (1 - Risk start 2-23 months series) Never done    Hib vaccine (1 of 1 - Risk 1-dose series) Never done    Meningococcal B vaccine (1 of 4 - Increased Risk Bexsero 2-dose series) Never done    Hepatitis C screen  Never done    DTaP/Tdap/Td vaccine (1 - Tdap) Never done    Shingles vaccine (2 of 3) 05/26/2016    Lipids  02/11/2022    Depression Screen  06/20/2023    Flu vaccine  Completed    Pneumococcal 65+ years Vaccine  Completed    COVID-19 Vaccine  Completed    Hepatitis A vaccine  Aged Out         Objective:     Physical Exam  Constitutional:       General: He is not in acute distress. Appearance: He is well-developed. He is not diaphoretic. HENT:      Head: Normocephalic and atraumatic. Right Ear: External ear normal.      Left Ear: External ear normal.   Eyes:      Conjunctiva/sclera: Conjunctivae normal.   Neck:      Vascular: No JVD. Cardiovascular:      Rate and Rhythm: Normal rate and regular rhythm. Heart sounds: Normal heart sounds. Pulmonary:      Effort: Pulmonary effort is normal.      Breath sounds: Wheezing and rhonchi present. No rales. Musculoskeletal:         General: No tenderness. Skin:     General: Skin is warm and dry. Coloration: Skin is not pale. Neurological:      Mental Status: He is alert and oriented to person, place, and time. /64   Pulse 80   Temp 99.3 °F (37.4 °C) (Oral)   Resp 20   Wt 217 lb (98.4 kg)   SpO2 97%   BMI 30.27 kg/m²       Impression/Plan:  1. COVID-19    2.  Centrilobular emphysema ([address]      Requested Prescriptions     Signed Prescriptions Disp Refills    nirmatrelvir/ritonavir (PAXLOVID) 20 x 150 MG & 10 x 100MG 30 tablet 0     Sig: Take 3 tablets (two 150 mg nirmatrelvir and one 100 mg ritonavir tablets) by mouth every 12 hours for 5 days. No orders of the defined types were placed in this encounter. Reviewed experimental use authorization for packs elevated  Patient giveneducational materials - see patient instructions. Discussed use, benefit, and side effects of prescribed medications. All patient questions answered. Pt voiced understanding. Reviewed health maintenance. Patient agreedwith treatment plan. Follow up as directed. **This report has been created using voice recognition software. It may contain minor errorswhich are inherent in voice recognition technology. **       Electronically signed by Bobby Hylton MD on 6/28/2022 at 7:27 PM

## 2022-07-01 LAB
ABSOLUTE BASO #: 100 /CMM (ref 0–200)
ABSOLUTE EOS #: 700 /CMM (ref 0–500)
ABSOLUTE LYMPH #: 3900 /CMM (ref 1000–4800)
ABSOLUTE MONO #: 1600 /CMM (ref 0–800)
ABSOLUTE NEUT #: 6400 /CMM (ref 1800–7700)
ANION GAP SERPL CALCULATED.3IONS-SCNC: 4 MMOL/L (ref 4–12)
BASOPHILS RELATIVE PERCENT: 1.1 % (ref 0–2)
BUN BLDV-MCNC: 18 MG/DL (ref 7–20)
CALCIUM SERPL-MCNC: 9.7 MG/DL (ref 8.8–10.5)
CHLORIDE BLD-SCNC: 100 MEQ/L (ref 101–111)
CO2: 35 MEQ/L (ref 21–32)
CREAT SERPL-MCNC: 0.84 MG/DL (ref 0.6–1.3)
CREATININE CLEARANCE: >60
EOSINOPHILS RELATIVE PERCENT: 5.7 % (ref 0–6)
ESTIMATED AVERAGE GLUCOSE: 166 MG/DL
GLUCOSE: 147 MG/DL (ref 70–110)
HBA1C MFR BLD: 7.4 % (ref 4.4–6.4)
HCT VFR BLD CALC: 33.8 % (ref 40–49)
HEMOGLOBIN: 11.3 GM/DL (ref 13.5–16.5)
LYMPHOCYTES RELATIVE PERCENT: 30.7 % (ref 15–45)
MCH RBC QN AUTO: 31.5 PG (ref 27.5–33)
MCHC RBC AUTO-ENTMCNC: 33.5 GM/DL (ref 33–36)
MCV RBC AUTO: 94.2 CU MIC (ref 80–97)
MONOCYTES RELATIVE PERCENT: 12.4 % (ref 2–10)
NEUTROPHILS RELATIVE PERCENT: 50.1 % (ref 40–70)
NUCLEATED RBCS: 0.1 /100 WBC
PDW BLD-RTO: 12.8 % (ref 12–16)
PLATELET # BLD: 294 TH/CMM (ref 150–400)
POTASSIUM SERPL-SCNC: 4.7 MEQ/L (ref 3.6–5)
RBC # BLD: 3.59 MIL/CMM (ref 4.5–6)
SODIUM BLD-SCNC: 139 MEQ/L (ref 135–145)
WBC # BLD: 12.7 TH/CMM (ref 4.4–10.5)

## 2022-09-14 NOTE — PROGRESS NOTES
PULMONARY REHABILITATION / RTR PROGRAM  EDUCATION SESSION      Sandip Dumont  Session: _____      ADVANCED DIRECTIVES/PALLIATIVE CARE-  Pt states he and his wife already have a living will and he had no questions at this time. NUTRITION - I explained to pt how we have a nutritionist who can talk to him if he is interested. Pt states he is not interested at this time. Pt also stated that he has lost 15lbs since starting pulmonary rehab. Priscilla Olivo has been trying to get a portable O2 concentrator for home O2 use and he states he did not qualify for one that last time he was checked by his home care company([employer]). Pt was on 3L O2 with exertion when he started rehab and now the last 3 rehab sessions the pt has been exercising on 2L NC and has been tolerating well with SPO2 > or = to 90%. I explained to pt that he could possibly now qualify for the portable O2 concentrator so when he wants he could call his home care company and asked to be rechecked. Pt knows he may have to pay to have the check done at his home care company. Pt understood and had no questions at this time. Time Spent:  8 minutes.
Chest wall muscle strain, initial encounter

## 2022-11-21 ENCOUNTER — OFFICE VISIT (OUTPATIENT)
Dept: FAMILY MEDICINE CLINIC | Age: 77
End: 2022-11-21
Payer: MEDICARE

## 2022-11-21 VITALS
SYSTOLIC BLOOD PRESSURE: 124 MMHG | OXYGEN SATURATION: 95 % | BODY MASS INDEX: 31.33 KG/M2 | WEIGHT: 223.8 LBS | DIASTOLIC BLOOD PRESSURE: 68 MMHG | HEIGHT: 71 IN | RESPIRATION RATE: 16 BRPM | HEART RATE: 71 BPM

## 2022-11-21 DIAGNOSIS — M54.16 LUMBAR RADICULOPATHY: Primary | ICD-10-CM

## 2022-11-21 DIAGNOSIS — J43.2 CENTRILOBULAR EMPHYSEMA (HCC): ICD-10-CM

## 2022-11-21 PROCEDURE — 1036F TOBACCO NON-USER: CPT | Performed by: FAMILY MEDICINE

## 2022-11-21 PROCEDURE — 96372 THER/PROPH/DIAG INJ SC/IM: CPT | Performed by: FAMILY MEDICINE

## 2022-11-21 PROCEDURE — G8427 DOCREV CUR MEDS BY ELIG CLIN: HCPCS | Performed by: FAMILY MEDICINE

## 2022-11-21 PROCEDURE — 3023F SPIROM DOC REV: CPT | Performed by: FAMILY MEDICINE

## 2022-11-21 PROCEDURE — 1124F ACP DISCUSS-NO DSCNMKR DOCD: CPT | Performed by: FAMILY MEDICINE

## 2022-11-21 PROCEDURE — G8417 CALC BMI ABV UP PARAM F/U: HCPCS | Performed by: FAMILY MEDICINE

## 2022-11-21 PROCEDURE — G8484 FLU IMMUNIZE NO ADMIN: HCPCS | Performed by: FAMILY MEDICINE

## 2022-11-21 PROCEDURE — 99213 OFFICE O/P EST LOW 20 MIN: CPT | Performed by: FAMILY MEDICINE

## 2022-11-21 RX ORDER — CELECOXIB 200 MG/1
200 CAPSULE ORAL 2 TIMES DAILY
Qty: 60 CAPSULE | Refills: 3 | Status: SHIPPED | OUTPATIENT
Start: 2022-11-21

## 2022-11-21 RX ORDER — METHYLPREDNISOLONE ACETATE 80 MG/ML
160 INJECTION, SUSPENSION INTRA-ARTICULAR; INTRALESIONAL; INTRAMUSCULAR; SOFT TISSUE ONCE
Status: COMPLETED | OUTPATIENT
Start: 2022-11-21 | End: 2022-11-21

## 2022-11-21 RX ADMIN — METHYLPREDNISOLONE ACETATE 160 MG: 80 INJECTION, SUSPENSION INTRA-ARTICULAR; INTRALESIONAL; INTRAMUSCULAR; SOFT TISSUE at 10:27

## 2022-11-21 SDOH — ECONOMIC STABILITY: FOOD INSECURITY: WITHIN THE PAST 12 MONTHS, THE FOOD YOU BOUGHT JUST DIDN'T LAST AND YOU DIDN'T HAVE MONEY TO GET MORE.: NEVER TRUE

## 2022-11-21 SDOH — ECONOMIC STABILITY: FOOD INSECURITY: WITHIN THE PAST 12 MONTHS, YOU WORRIED THAT YOUR FOOD WOULD RUN OUT BEFORE YOU GOT MONEY TO BUY MORE.: NEVER TRUE

## 2022-11-21 ASSESSMENT — SOCIAL DETERMINANTS OF HEALTH (SDOH): HOW HARD IS IT FOR YOU TO PAY FOR THE VERY BASICS LIKE FOOD, HOUSING, MEDICAL CARE, AND HEATING?: NOT HARD AT ALL

## 2022-11-21 NOTE — PROGRESS NOTES
Administrations This Visit       methylPREDNISolone acetate (DEPO-MEDROL) injection 160 mg       Admin Date  11/21/2022  10:27 Action  Given Dose  160 mg Route  IntraMUSCular Site  Dorsogluteal Right Administered By  Nasreen Cespedes RN    Ordering Provider: Tyler Hayes MD    NDC: 7550-0078-95    Lot#: [id number]    : [id number].     Patient Supplied?: No

## 2022-11-27 ASSESSMENT — ENCOUNTER SYMPTOMS
SINUS PRESSURE: 0
DIARRHEA: 0
SORE THROAT: 0
RHINORRHEA: 0
ABDOMINAL PAIN: 0
SHORTNESS OF BREATH: 0
COUGH: 0
BACK PAIN: 1
EYE PAIN: 0
ABDOMINAL DISTENTION: 0
NAUSEA: 0
CONSTIPATION: 0

## 2022-11-27 NOTE — PROGRESS NOTES
[address] [place] [facility] [address]  Dept: [phone]  Dept Fax: [phone]  Loc: [phone]  PROGRESS NOTE      VisitDate: 11/21/2022    Mitch Latif is a 65 y.o. male who presents today for:     Chief Complaint   Patient presents with    Hip Pain     Patient has been experiencing right hip and leg pain for the past 2 weeks. Subjective:  HPI  Patient comes in for low back pain with radicular symptoms. He has known degenerative lumbar spine disease and has flareups from time to time. History of COPD that has been stable. Oxygen supplementation he continues to require oxygen and benefits from having it    Review of Systems   Constitutional:  Negative for appetite change and fever. HENT:  Negative for congestion, ear pain, postnasal drip, rhinorrhea, sinus pressure and sore throat. Eyes:  Negative for pain and visual disturbance. Respiratory:  Negative for cough and shortness of breath. Cardiovascular:  Negative for chest pain. Gastrointestinal:  Negative for abdominal distention, abdominal pain, constipation, diarrhea and nausea. Genitourinary:  Negative for dysuria, frequency and urgency. Musculoskeletal:  Positive for back pain. Negative for arthralgias. Skin:  Negative for rash. Neurological:  Negative for dizziness. Past Medical History:   Diagnosis Date    Arthritis     Cancer ([facility] [address] 1990    Spelenectomy, cholycystectomy,partial pancrease, partial stomache, lymphnodes    COPD (chronic obstructive pulmonary disease) (HCC)     Hypertension     Nausea & vomiting     Type II or unspecified type diabetes mellitus without mention of complication, not stated as uncontrolled       Past Surgical History:   Procedure Laterality Date    CHOLECYSTECTOMY      COLONOSCOPY  3/16/12    [address]    CYST REMOVAL      FROM NECK AND BACK    HERNIA REPAIR      OTHER SURGICAL HISTORY  1/25/2013    RIGHT POST.  CERVICAL LAMINOTOMIES AND FORAMINOTOMIES    PANCREAS SURGERY      partial removal     PARATHYROID GLAND SURGERY      PILONIDAL CYST EXCISION      SPLENECTOMY, TOTAL       Family History   Problem Relation Age of Onset    Cancer Father         zollinger shay syndrome    Cancer Son         carcinoid cancer     Social History     Tobacco Use    Smoking status: Former     Packs/day: 2.00     Years: 20.00     Pack years: 40.00     Types: Cigarettes     Quit date: 1991     Years since quittin.5    Smokeless tobacco: Never   Substance Use Topics    Alcohol use: Yes     Alcohol/week: 0.0 standard drinks     Comment: rarely      Current Outpatient Medications   Medication Sig Dispense Refill    celecoxib (CELEBREX) 200 MG capsule Take 1 capsule by mouth 2 times daily 60 capsule 3    tiotropium (SPIRIVA RESPIMAT) 2.5 MCG/ACT AERS inhaler Inhale 2 puffs into the lungs daily 3 each 1    ADVAIR DISKUS 250-50 MCG/DOSE AEPB USE 1 INHALATION TWICE A  each 3    metFORMIN (GLUCOPHAGE) 500 MG tablet Take 1 tablet by mouth 2 times daily (with meals) HOLD 48 HRS PRIOR TO SURGERY 60 tablet 5    albuterol (PROVENTIL) (2.5 MG/3ML) 0.083% nebulizer solution Take 3 mLs by nebulization every 6 hours as needed for Wheezing or Shortness of Breath 120 each 3    BD INSULIN SYRINGE U/F 31G X 5/16\" 0.5 ML MISC USE AS DIRECTED UNDER THE SKIN TWICE DAILY      atorvastatin (LIPITOR) 10 MG tablet Take 1 tablet by mouth nightly       doxazosin (CARDURA) 8 MG tablet Take 1 tablet by mouth nightly       albuterol sulfate (PROAIR RESPICLICK) 146 (90 Base) MCG/ACT aerosol powder inhalation Inhale 2 puffs into the lungs every 6 hours as needed for Wheezing or Shortness of Breath 3 Inhaler 3    OXYGEN Inhale into the lungs 1 L at rest and 3 L with activity      sildenafil (VIAGRA) 100 MG tablet Take 100 mg by mouth as needed for Erectile Dysfunction       TRAVATAN Z 0.004 % SOLN ophthalmic solution Place 1 drop into both eyes nightly.       Insulin Lispro Prot & Lispro (HUMALOG MIX 75/25 SC) Inject 30 Units into the skin 2 times daily       quinapril (ACCUPRIL) 20 MG tablet Take 20 mg by mouth 2 times daily. esomeprazole (NEXIUM) 40 MG delayed release capsule Take 40 mg by mouth 2 times daily. No current facility-administered medications for this visit. Allergies   Allergen Reactions    Propofol Anaphylaxis    Bee Venom      Health Maintenance   Topic Date Due    Meningococcal (ACWY) vaccine (1 - Risk start 2-23 months series) Never done    Hib vaccine (1 of 1 - Risk 1-dose series) Never done    Meningococcal B vaccine (1 of 4 - Increased Risk Bexsero 2-dose series) Never done    Hepatitis C screen  Never done    DTaP/Tdap/Td vaccine (1 - Tdap) Never done    Shingles vaccine (1 of 2) 05/26/2016    Lipids  02/11/2022    Depression Screen  06/20/2023    Flu vaccine  Completed    Pneumococcal 65+ years Vaccine  Completed    COVID-19 Vaccine  Completed    Hepatitis A vaccine  Aged Out         Objective:     Physical Exam  Constitutional:       General: He is not in acute distress. Appearance: He is well-developed. He is not diaphoretic. HENT:      Head: Normocephalic and atraumatic. Right Ear: External ear normal.      Left Ear: External ear normal.   Eyes:      Conjunctiva/sclera: Conjunctivae normal.   Neck:      Vascular: No JVD. Cardiovascular:      Rate and Rhythm: Normal rate and regular rhythm. Heart sounds: Normal heart sounds. Pulmonary:      Effort: Pulmonary effort is normal.      Breath sounds: Normal breath sounds. No wheezing or rales. Musculoskeletal:         General: No tenderness. Skin:     General: Skin is warm and dry. Coloration: Skin is not pale. Neurological:      Mental Status: He is alert and oriented to person, place, and time.      /68 (Site: Right Upper Arm, Position: Sitting, Cuff Size: Large Adult)   Pulse 71   Resp 16   Ht 5' 11\" (1.803 m)   Wt 223 lb 12.8 oz (101.5 kg) SpO2 95% Comment: 1L  BMI 31.21 kg/m²       Impression/Plan:  1. Lumbar radiculopathy    2. Centrilobular emphysema (HCC)      Requested Prescriptions     Signed Prescriptions Disp Refills    celecoxib (CELEBREX) 200 MG capsule 60 capsule 3     Sig: Take 1 capsule by mouth 2 times daily     No orders of the defined types were placed in this encounter. Methylprednisolone 160 IM    Patient giveneducational materials - see patient instructions. Discussed use, benefit, and side effects of prescribed medications. All patient questions answered. Pt voiced understanding. Reviewed health maintenance. Patient agreedwith treatment plan. Follow up as directed. **This report has been created using voice recognition software. It may contain minor errorswhich are inherent in voice recognition technology. **       Electronically signed by Maddie Orona MD on 11/27/2022 at 10:40 AM

## 2022-12-05 ENCOUNTER — OFFICE VISIT (OUTPATIENT)
Dept: FAMILY MEDICINE CLINIC | Age: 77
End: 2022-12-05

## 2022-12-05 VITALS
WEIGHT: 211 LBS | RESPIRATION RATE: 16 BRPM | SYSTOLIC BLOOD PRESSURE: 100 MMHG | OXYGEN SATURATION: 95 % | TEMPERATURE: 98.7 F | DIASTOLIC BLOOD PRESSURE: 58 MMHG | BODY MASS INDEX: 29.43 KG/M2 | HEART RATE: 88 BPM

## 2022-12-05 DIAGNOSIS — J44.1 COPD EXACERBATION (HCC): Primary | ICD-10-CM

## 2022-12-05 RX ORDER — CLOBETASOL PROPIONATE 0.5 MG/G
OINTMENT TOPICAL
COMMUNITY
Start: 2022-11-16

## 2022-12-05 RX ORDER — METHYLPREDNISOLONE 4 MG/1
TABLET ORAL
COMMUNITY
Start: 2022-11-28 | End: 2022-12-05 | Stop reason: ALTCHOICE

## 2022-12-05 RX ORDER — CEFUROXIME AXETIL 250 MG/1
250 TABLET ORAL 2 TIMES DAILY
Qty: 20 TABLET | Refills: 0 | Status: SHIPPED | OUTPATIENT
Start: 2022-12-05 | End: 2022-12-15

## 2022-12-05 RX ORDER — FEXOFENADINE HYDROCHLORIDE 180 MG/1
TABLET, FILM COATED ORAL
COMMUNITY
Start: 2022-11-28

## 2022-12-05 RX ORDER — DOXAZOSIN MESYLATE 4 MG/1
TABLET ORAL
COMMUNITY
Start: 2022-11-25

## 2022-12-05 RX ORDER — PREDNISONE 10 MG/1
TABLET ORAL
Qty: 30 TABLET | Refills: 0 | Status: SHIPPED | OUTPATIENT
Start: 2022-12-05 | End: 2022-12-15

## 2022-12-11 ASSESSMENT — ENCOUNTER SYMPTOMS
RHINORRHEA: 0
NAUSEA: 0
CONSTIPATION: 0
SORE THROAT: 0
COUGH: 1
SHORTNESS OF BREATH: 1
ABDOMINAL PAIN: 0
WHEEZING: 1
EYE PAIN: 0
DIARRHEA: 0
ABDOMINAL DISTENTION: 0
SINUS PRESSURE: 0

## 2022-12-11 NOTE — PROGRESS NOTES
[address] David Price [facility] [address]  Dept: [phone]  Dept Fax: [phone]  Loc: [phone]  PROGRESS NOTE      VisitDate: 12/5/2022    Adam Thomas is a 65 y.o. male who presents today for:     Chief Complaint   Patient presents with    Shortness of Breath     Cough, runny nose, fatigue, dizziness when he is getting up and walking around- symptoms started 11/25/2022. Subjective:  HPI  Patient with a history of CVA comes in cough congestion fatigue shortness of breath. Continues on oxygen. Feels his COPD is flaring up denies fever chills nausea vomiting or diarrhea    Review of Systems   Constitutional:  Negative for appetite change and fever. HENT:  Positive for congestion. Negative for ear pain, postnasal drip, rhinorrhea, sinus pressure and sore throat. Eyes:  Negative for pain and visual disturbance. Respiratory:  Positive for cough, shortness of breath and wheezing. Cardiovascular:  Negative for chest pain. Gastrointestinal:  Negative for abdominal distention, abdominal pain, constipation, diarrhea and nausea. Genitourinary:  Negative for dysuria, frequency and urgency. Musculoskeletal:  Negative for arthralgias. Skin:  Negative for rash. Neurological:  Negative for dizziness. Past Medical History:   Diagnosis Date    Arthritis     Cancer ([facility] [address] 1990    Spelenectomy, cholycystectomy,partial pancrease, partial stomache, lymphnodes    COPD (chronic obstructive pulmonary disease) (HCC)     Hypertension     Nausea & vomiting     Type II or unspecified type diabetes mellitus without mention of complication, not stated as uncontrolled       Past Surgical History:   Procedure Laterality Date    CHOLECYSTECTOMY      COLONOSCOPY  3/16/12    [address]    CYST REMOVAL      FROM NECK AND BACK    HERNIA REPAIR      OTHER SURGICAL HISTORY  1/25/2013    RIGHT POST.  CERVICAL LAMINOTOMIES AND FORAMINOTOMIES PANCREAS SURGERY      partial removal     PARATHYROID GLAND SURGERY      PILONIDAL CYST EXCISION      SPLENECTOMY, TOTAL       Family History   Problem Relation Age of Onset    Cancer Father         zollinger shay syndrome    Cancer Son         carcinoid cancer     Social History     Tobacco Use    Smoking status: Former     Packs/day: 2.00     Years: 20.00     Pack years: 40.00     Types: Cigarettes     Quit date: 1991     Years since quittin.6    Smokeless tobacco: Never   Substance Use Topics    Alcohol use:  Yes     Alcohol/week: 0.0 standard drinks     Comment: rarely      Current Outpatient Medications   Medication Sig Dispense Refill    ALLERGY RELIEF 180 MG tablet TAKE 1 TABLET BY MOUTH DAILY AS NEEDED      clobetasol (TEMOVATE) 0.05 % ointment APPLY TOPICALLY TO THE AFFECTED AREA TWICE DAILY      doxazosin (CARDURA) 4 MG tablet TAKE 1 TABLET BY MOUTH EVERY DAY      predniSONE (DELTASONE) 10 MG tablet 4 po qd for 3 days, then 3 po qd for 3 days, then 2 po qd for 3 days, then 1 po qd for 3 days 30 tablet 0    cefUROXime (CEFTIN) 250 MG tablet Take 1 tablet by mouth 2 times daily for 10 days 20 tablet 0    tiotropium (SPIRIVA RESPIMAT) 2.5 MCG/ACT AERS inhaler Inhale 2 puffs into the lungs daily 3 each 1    ADVAIR DISKUS 250-50 MCG/DOSE AEPB USE 1 INHALATION TWICE A  each 3    metFORMIN (GLUCOPHAGE) 500 MG tablet Take 1 tablet by mouth 2 times daily (with meals) HOLD 48 HRS PRIOR TO SURGERY 60 tablet 5    albuterol (PROVENTIL) (2.5 MG/3ML) 0.083% nebulizer solution Take 3 mLs by nebulization every 6 hours as needed for Wheezing or Shortness of Breath 120 each 3    BD INSULIN SYRINGE U/F 31G X 5/16\" 0.5 ML MISC USE AS DIRECTED UNDER THE SKIN TWICE DAILY      atorvastatin (LIPITOR) 10 MG tablet Take 1 tablet by mouth nightly       albuterol sulfate (PROAIR RESPICLICK) 777 (90 Base) MCG/ACT aerosol powder inhalation Inhale 2 puffs into the lungs every 6 hours as needed for Wheezing or Shortness of Breath 3 Inhaler 3    OXYGEN Inhale into the lungs 1 L at rest and 3 L with activity      TRAVATAN Z 0.004 % SOLN ophthalmic solution Place 1 drop into both eyes nightly. Insulin Lispro Prot & Lispro (HUMALOG MIX 75/25 SC) Inject 30 Units into the skin 2 times daily       quinapril (ACCUPRIL) 20 MG tablet Take 20 mg by mouth 2 times daily. esomeprazole (NEXIUM) 40 MG delayed release capsule Take 40 mg by mouth 2 times daily. celecoxib (CELEBREX) 200 MG capsule Take 1 capsule by mouth 2 times daily (Patient not taking: Reported on 12/5/2022) 60 capsule 3     No current facility-administered medications for this visit. Allergies   Allergen Reactions    Propofol Anaphylaxis    Bee Venom      Health Maintenance   Topic Date Due    Meningococcal (ACWY) vaccine (1 - Risk start 2-23 months series) Never done    Hib vaccine (1 of 1 - Risk 1-dose series) Never done    Meningococcal B vaccine (1 of 4 - Increased Risk Bexsero 2-dose series) Never done    Hepatitis C screen  Never done    DTaP/Tdap/Td vaccine (1 - Tdap) Never done    Shingles vaccine (1 of 2) 05/26/2016    Lipids  02/11/2022    Depression Screen  06/20/2023    Flu vaccine  Completed    Pneumococcal 65+ years Vaccine  Completed    COVID-19 Vaccine  Completed    Hepatitis A vaccine  Aged Out         Objective:     Physical Exam  Constitutional:       General: He is not in acute distress. Appearance: He is well-developed. He is not diaphoretic. HENT:      Head: Normocephalic and atraumatic. Right Ear: External ear normal.      Left Ear: External ear normal.   Eyes:      Conjunctiva/sclera: Conjunctivae normal.   Neck:      Vascular: No JVD. Cardiovascular:      Rate and Rhythm: Normal rate and regular rhythm. Heart sounds: Normal heart sounds. Pulmonary:      Effort: Pulmonary effort is normal.      Breath sounds: Wheezing and rhonchi present. No rales. Musculoskeletal:         General: No tenderness.    Skin: General: Skin is warm and dry. Coloration: Skin is not pale. Neurological:      Mental Status: He is alert and oriented to person, place, and time. BP (!) 100/58   Pulse 88   Temp 98.7 °F (37.1 °C) (Oral)   Resp 16   Wt 211 lb (95.7 kg)   SpO2 95%   BMI 29.43 kg/m²       Impression/Plan:  1. COPD exacerbation (HCC)      Requested Prescriptions     Signed Prescriptions Disp Refills    predniSONE (DELTASONE) 10 MG tablet 30 tablet 0     Si po qd for 3 days, then 3 po qd for 3 days, then 2 po qd for 3 days, then 1 po qd for 3 days    cefUROXime (CEFTIN) 250 MG tablet 20 tablet 0     Sig: Take 1 tablet by mouth 2 times daily for 10 days     No orders of the defined types were placed in this encounter. Patient giveneducational materials - see patient instructions. Discussed use, benefit, and side effects of prescribed medications. All patient questions answered. Pt voiced understanding. Reviewed health maintenance. Patient agreedwith treatment plan. Follow up as directed. **This report has been created using voice recognition software. It may contain minor errorswhich are inherent in voice recognition technology. **       Electronically signed by Ginny Lin MD on 2022 at 7:06 AM

## 2022-12-14 ENCOUNTER — TELEPHONE (OUTPATIENT)
Dept: PULMONOLOGY | Age: 77
End: 2022-12-14

## 2022-12-14 DIAGNOSIS — J43.2 CENTRILOBULAR EMPHYSEMA (HCC): ICD-10-CM

## 2022-12-14 DIAGNOSIS — G47.34 NOCTURNAL OXYGEN DESATURATION: ICD-10-CM

## 2022-12-14 DIAGNOSIS — J96.11 CHRONIC RESPIRATORY FAILURE WITH HYPOXIA AND HYPERCAPNIA (HCC): Primary | ICD-10-CM

## 2022-12-14 DIAGNOSIS — J96.12 CHRONIC RESPIRATORY FAILURE WITH HYPOXIA AND HYPERCAPNIA (HCC): Primary | ICD-10-CM

## 2022-12-14 DIAGNOSIS — J98.4 MIXED RESTRICTIVE AND OBSTRUCTIVE LUNG DISEASE (HCC): ICD-10-CM

## 2022-12-14 DIAGNOSIS — J43.9 MIXED RESTRICTIVE AND OBSTRUCTIVE LUNG DISEASE (HCC): ICD-10-CM

## 2022-12-14 NOTE — TELEPHONE ENCOUNTER
Patient called in stating that he purchased a POC with hisown money and would like you to place a prescription for it to help insurance cover it. He has not been walked on a POC and when asked he states he is still using 1 lpm at rest and 3 lpm with exertion on his POC. He states that he can  the script if you decide to place it. Please advise patient with next steps. Thanks!

## 2023-01-11 NOTE — PLAN OF CARE
PHYSICIAN PULMONARY REHABILITATION ORDER  Medical Director:  Dr. Birgit Kuo MD    (X)  Phase II Pulmonary Janetrt, supervised exercise with SpO2 / HR monitoring and Oxygen Titration (if necessary) each session, twice weekly, with individualized education sessions. Patient Name: Jaspal Stein  : 1945  Referring Physician: Dr. Dereje Gacsa   Date: 2023    Medically Necessary Pulmonary Rehab for:  Severe COPD (from my dictation or the PFT report), which is GOLD Stage 3. Physician Prescribed Exercise:  Length of program:  18 weeks, up to 36 sessions, subject to insurance limitations. Program to include aerobic endurance conditioning, resistance training (RT), and flexibility training, and education (all relevant topics including psychosocial assessment). FITT Principles + Progression for Exercise Prescription (also found on the ITP):     Frequency: 2x / wk for 36 sessions    Intensity:   Initial MET level calculated from Initial 6MWT (Feet achieved converted to METs)   Type:        Aerobic and Strength (Treadmill, AD, NuStep, UBE, RT)   Time:      Each session:  31-91 min. of Treatment; Aerobic, RT, Rest breaks and Education  Progression:  1-2 minute increase in Time, per Type, per session, 5-20% increase in Intensity per week if SpO2 >87% AND Greta RPE/RPD is <5      Note:  These are guidelines, the Pulmonary Rehab staff may adjust the treatment to suit the patient's individual needs, goals, oxygen saturation and functional level. Plan of Care:  Pulmonary Rehab aerobic endurance and strength training sessions for a total of 31-91 min/day, including Education time, 2 days/week with suggested supplemented matching minutes of walking at home on most days not participating in Pulmonary Rehab. Patient is willing to cooperate and participate in the plan of care.  Patient is physically able, motivated, and willing to participate in [place], and I expect this patient to improve in a reasonable and predictable time frame. Other Program Components:   (X)6 Minute Walk Test (6 MWT) at program initiation and discharge   (X)Evaluation for oxygen needs while exercising   (X)Titrate Oxygen to maintain >87% SpO2   (X)Stop Exercise or Apply Oxygen if patient desaturates <88%    Measurable Endurance Goal:    -Aerobic endurance goal to be measured in minutes. Start endurance training per patient tolerance at 1-15 minutes per exercise type, progressing to a total of 31-60 minutes using various modes of training (see Exercise Prescription on Individualized Treatment Plan 'ITP'). -Measurable Muscular Strength Goal: Starting at 1-3 lbs x 8 reps, progressing daily/weekly to 5-10 lbs x 15 reps    NOTE:  If patient is a current smoker, patient will participate in tobacco cessation program during Pulmonary Rehab.       Physician Signature/Date/Time:

## 2023-01-24 ENCOUNTER — HOSPITAL ENCOUNTER (OUTPATIENT)
Dept: CARDIAC REHAB | Age: 78
Setting detail: THERAPIES SERIES
Discharge: HOME | End: 2023-01-24
Payer: MEDICARE

## 2023-01-24 VITALS — WEIGHT: 217.25 LBS | BODY MASS INDEX: 31.1 KG/M2 | HEIGHT: 70 IN

## 2023-01-24 PROCEDURE — 94625 PHY/QHP OP PULM RHB W/O MNTR: CPT

## 2023-01-24 NOTE — PROGRESS NOTES
[facility] Facility-Based Therapy for COPD   INDIVIDUAL TREATMENT PLAN (ITP)     Name: Da Jefferson   :  1945  Acct Number: [[id number]]   AGE: 65 y.o. Diagnosis:  Severe COPD, which is GOLD Stage 3                                               PFT:  Post Bronchodilator FEV1/FVC: 65  FEV1: 37    Patient Goals:  (x) Breathe better  (x) Increase my endurance (x) Have more energy  (x) Rely less on others  (x) Ease ADLs  (x) Understand and use meds correctly  (x) Control cough  (x) Make fewer visits to hospital  () Quit smoking  (x) Feel less anxious / have more confidence    Glossary:  AR=Pulmonary Rehab  PF=Physical Fitness TM=Treadmill  AD=Schwinn Airdyne  UBE=UpperBody Ergometer  RT=Resistance Training  PLB=Pursed Lip Breathing      COVID -19 Screen  Do you have any of the following symptoms:  [] Fever [] Cough [] SOB [] Muscle/Body Ache [] Loss of taste/smell [x] None    Have you traveled outside of the [place]? [] Yes      [x] No    Have you been around anyone who has tested Positive for COVID 19?  [] Yes      [x] No    The following Education has been completed with patient. [x] Wait in the lobby until we call you back to rehab. [x] Bring your own bottle of water with you. [x] You can not bring anyone with you in to the rehab clinic at this time. They are welcome to sit in the lobby or wait in the car.       INDIVIDUAL TREATMENT PLAN    INITIAL ASSESSMENT    Day #1 INDIVIDUAL TREATMENT PLAN    RE ASSESSMENT    Day #2-30 INDIVIDUAL TREATMENT PLAN    RE ASSESSMENT    Day #31-60 INDIVIDUAL TREATMENT PLAN    RE ASSESSMENT    Day #61-90 INDIVIDUAL TREATMENT PLAN    RE ASSESSMENT    Day # INDIVIDUAL TREATMENT PLAN      DISCHARGE    Last Day        Date: 2023     Date:    Date:    Date:    Date:    Date:    EXERCISE   INITIAL ASSESSMENT      Prescribed Oxygen Use  Activity: 3 L/min  () Continuous  (x) Breath Actuated      Oxygen Titration  (x) Assess for desaturation            Current Home Exercise:  None   EXERCISE  PLAN        Current Oxygen Use  Activity:  L/min  () Continuous  () Breath Actuated      Oxygen Titration  () Maintaining >87% Spo2  () Not maintaining -  L/min needed on       Current Home Exercise:  Frequency:  x/wk  Intensity:  /10  Duration:  min  Mode:   EXERCISE  RE ASSESSMENT      Current Oxygen Use  Activity:  L/min  () Continuous  () Breath Actuated      Oxygen Titration  () Maintaining >87% Spo2  () Not maintaining -  L/min needed on       Current Home Exercise:  Frequency:  x/wk  Intensity:  /10  Duration:  min  Mode: EXERCISE  RE ASSESSMENT      Current Oxygen Use  Activity:  L/min  () Continuous  () Breath Actuated      Oxygen Titration  () Maintaining >87% Spo2  () Not maintaining -  L/min needed on       Current Home Exercise:  Frequency:  x/wk  Intensity:  /10  Duration:  min  Mode: EXERCISE  RE ASSESSMENT      Current Oxygen Use  Activity:  L/min  () Continuous  () Breath Actuated      Oxygen Titration  () Maintaining >87% Spo2  () Not maintaining -  L/min needed on       Current Home Exercise:  Frequency:  x/wk  Intensity:  /10  Duration:  min  Mode:   EXERCISE  DISCHARGE        Final Oxygen Use  Activity:  L/min  () Continuous  () Breath Actuated      Oxygen Titration  () Maintaining >87% Spo2  () Not maintaining -  L/min needed on       Current Home Exercise:  Frequency:  x/wk  Intensity:  /10  Duration:  min  Mode:       6 Minute Walk Test (6MWT):  O2 used: 3LPM nasal cannula   1060 ft walked = 2.5 METs  SpO2: 89-97%  HR: 80 - 119  RPD: 4  RPE: 5  Number of Breaks: none   Avg time for break:  n/a  Assist device used: none         6 Minute Walk Test (6MWT):  O2 used:   ft walked =  METs  SpO2:  %  HR:    RPD:   RPE:  Number of Breaks:    Avg time for break:  secs  Assist device used:             OUTCOMES:  6MWD Improvement:  > 98'?  () Yes  () No     Exercise Prescription    THR: 71 - 113 bpm    Frequency:  2-3x/wk in [place], 1-3x/wk home activity    Intensity:  METs (from 6MWT)      Time:  15-30 total minutes up to 55 minutes max    Type:  Aerobic (TM, AD, UBE, NuStep), 6 ST, RT 1-10# 8-15 reps    Progression:  Increase 30s - 2 min/mode for Aerobic activity, and increase Intensity 2-5% each week if RPE <5, RPD <5, SpO2 >87% and pt is within [facility] above. Exercise Prescription    () Pt exercising within THR    Frequency:  2-3x/wk in [place], 1-3x/wk home activity    Intensity:  3-5 on Greta Dyspnea/ Fatigue scale    Time:  15-30 total minutes up to 55 minutes max    Type:  Aerobic (TM, AD, UBE, NuStep), 6 ST, RT 1-10# 8-15 reps    Progression:  Increase 30s - 2 min/mode for Aerobic activity, and increase Intensity 2-5% each week if RPE <5, RPD <5, SpO2 >87% and pt is within [facility] above. Exercise Prescription    () Pt exercising within THR    Frequency:  2-3x/wk in [place], 2-5x/wk home activity    Intensity:  3-5 on Greta Dyspnea/ Fatigue scale    Time:  30-45 total minutes up to 55 minutes max    Type:  Aerobic (TM, AD, UBE, NuStep), 6 ST, RT 1-10# 8-15 reps    Progression:  Increase 30s - 2 min/mode for Aerobic activity, and increase Intensity 2-5% each week if RPE <5, RPD <5, SpO2 >87% and pt is within [facility] above. Exercise Prescription    () Pt exercising within THR    Frequency:  2-3x/wk in [place], 2-5x/wk home activity    Intensity:  3-5 on Greta Dyspnea/ Fatigue scale    Time:  30-45 total minutes up to 55 minutes max    Type:  Aerobic (TM, AD, UBE, NuStep), 6 ST, RT 1-10# 8-15 reps    Progression:  Increase 30s - 2 min/mode for Aerobic activity up to 35 minutes, and increase Intensity 2-5% each week if RPE <5, RPD <5, SpO2 >87% and pt is within [facility] above.  Exercise Prescription    () Pt exercising within THR    Frequency:  2-3x/wk in [place], 2-5x/wk home activity    Intensity:  3-5 on Greta Dyspnea/ Fatigue scale    Time:  30-45 total minutes up to 55 minutes max    Type:  Aerobic (TM, AD, UBE, NuStep), 6 ST, RT 1-10# 8-15 reps    Progression:  Increase 30s - 2 min/mode for Aerobic activity up to 35 minutes, and increase Intensity 2-5% each week if RPE <5, RPD <5, SpO2 >87% and pt is within [facility] above. Home Program          (x) Given to patient with current levels, recommendation and guidelines.                                        Initial Levels:  TM:1.9mph,6 min  AD:1.0level,6 min  NuStep:56W,  6 min  UBE:0.5level, 5 min  RT 3#, 8-15 reps   Current Levels:  TM:  mph,  min  AD:  level,  min  NuStep:  W,  min  UBE:  level, 5 min  RT  #, 8-15 reps   Current Levels:  TM:  mph,  min  AD:  level,  min  NuStep:  W,  min  UBE:  level, 5 min  RT  #, 8-15 reps Current Levels:  TM:  mph,  min  AD:  level,  min  NuStep:  W,  min  UBE:  level, 5 min  RT  #, 8-15 reps Current Levels:  TM:  mph,  min  AD:  level,  min  NuStep:  W,  min  UBE:  level, 5 min  RT  #, 8-15 reps   Final Levels:  TM:  mph,  min  AD:  level,  min  NuStep:  W,  min  UBE:  level, 5 min  RT  #, 8-15 reps     Physical Limitations  Initial Assessment    () Weakness  () Inflexible  () Poor posture  () Gait abnormality  () Balance  () Orthopedic Issues   Physical Limitation  Plan      (x) Strengthen through squats and RT  (x) Appropriate stretches shown  (x)Verbal cues and reminders for posture and gait given  (x) Proper modalities chosen for ortho issues  (x) Give Home activity / stretches/ Warmup/ [place] info   Physical Limitations Reassessment      () Pt progressing  () Pt not progressing Physical Limitations Reassessment      () Pt progressing  () Pt not progressing Physical Limitations Reassessment      () Pt progressing  () Pt not progressing     Physical Limitations  Discharge      () Issues Addressed  () PT/OT suggested       Exercise Goals   Initial Assessment:    (x) Start to, and commit to exercising regularly     (x) Learn about home activity recommendations        (x) Increase PF shown by increasing 6MWD   Exercise Goals   Plan      -Initiate VT 2x/week  -Encourage home exercise    -Attend Home Activity EDUCATION class      -Slowly, safely, progress in [place]    Exercise Goals   Reassessment      () Pt is coming regularly  () Pt is sporadic    () Pt has had class  () Pt has not had class        () Pt is progressing  () Pt is not progressing Exercise Goals   Reassessment      () Pt is coming regularly  () Pt is sporadic    () Pt has had class  () Pt has not had class        () Pt is progressing  () Pt is not progressing Exercise Goals   Reassessment      () Pt is coming regularly  () Pt is sporadic    () Pt has had class  () Pt has not had class        () Pt is progressing  () Pt is not progressing   Exercise Goals Discharge      () GOAL Met?  () GOAL not met -      () Pt had EDUCATION class  Date:   () Pt did not have class    () GOAL Met  See 6MWD above  () GOAL not Met:     Exercise Intervention Initial Assessment      () Transportation needed        (x) EDUCATION needed          (x) Motivation needed   Exercise Intervention/ Education   Plan      () [facility] set up        () EDUCATION:  Home activity class to be given        () Positive encouragement, support and motivation to be given   Exercise Intervention/ Education Reassessment      () Pt is attending  () Pt is not attending      () Pt has had class  () Pt has not had class      () Pt is progressing  () Pt not progressing Exercise Intervention/ Education Reassessment      () Pt is attending  () Pt is not attending      () Pt has had class  () Pt has not had class      () Pt is progressing  () Pt not progressing Exercise Intervention/ Education Reassessment      () Pt is attending  () Pt is not attending      () Pt has had class  () Pt has not had class      () Pt is progressing  () Pt not progressing Exercise Intervention/ Edcuation Discharge      () Pt attended most  () Pt cancelled a lot    () Pt has had class  Date: See above  () Pt did not have class    () Pt progressed and did well  () Pt had difficulty-               NUTRITION   INITIAL ASSESSMENT      Height:  510   Weight: 217.4 lbs  BMI: 31.2    30 day goal: 215 Lbs (2-4lbs)    (x) Overweight  () Underweight  () Normal  () Teeth issues  () GI issues  (x) Nutrition knowledge needed  (x) DM   NUTRITION   PLAN        Current Weight:  lbs      Goal achieved?:  Next 30 day goal: Lbs   NUTRITION  RE ASSESSMENT      Current Weight:  lbs      Goal achieved?:  Next 30 day goal: Lbs NUTRITION  RE ASSESSMENT      Current Weight:  lbs      Goal achieved?:  Next 30 day goal: Lbs NUTRITION  RE ASSESSMENT      Current Weight:  lbs      Goal achieved?:  Next 30 day goal: Lbs   NUTRITION DISCHARGE        Current Weight:  lbs  BMI:    Goal achieved?:     Nutrition Goals  Initial Assessment    (x) Pt is DM.   Increase nutrition knowledge and glucose control through diet and exercise      (x) Learn weight strategies to lose weight        (x) Learn about general nutrition          -Achievable weight goal for the end of the program:  205 Lbs (2-4lbs per month recommended)   Nutrition Goals   Plan      () DM:  Attend nutrition class and learn about quality carbohydrates and exercises role in DM    () Attend nutrition class          () Attend nutrition class          () Initiate exercise and give pt hints and tips for weight and will have class for information   Nutrition Goals  Reassessment      () DM: Pt has had class  () DM: Pt has not had class           () Pt has had class  () Pt has not had class       () Pt has had class  () Pt has not had class       () Pt progressing  () Pt not progressing     Nutrition Goals  Reassessment      () DM: Pt has had class  () DM: Pt has not had class           () Pt has had class  () Pt has not had class       () Pt has had class  () Pt has not had class       () Pt progressing  () Pt not progressing     Nutrition Goals  Reassessment      () DM: Pt has had class  () DM: Pt has not had class           () Pt has had class  () Pt has not had class       () Pt has had class  () Pt has not had class       () Pt progressing  () Pt not progressing     Nutrition Goals  Discharge      () Pt had nutrition class  Date:  () Pt has not had class        () Pt has had class  Date: See above  () Pt has not had class    () Pt has class  Date: See above  () Pt has not had class      () Final weight goal achieved  () Pt did not reach desired weight goal - (readdressed nutrition and exercise strategies for future goal attainment)        Nutrition Intervention/ Education   Initial Assessment    (x) Pt needs Nutrition education class        (x) Pt states does not need class       Nutrition Intervention/ Education  Plan      () Pt will have Nutrition class          () Encourage pt to continue to learn and incorporate self knowledge in to diet choices   Nutrition Intervention/ Education  Reassessment      () Pt has had class  () Pt has not had class      () Pt had questions  () Pt denies having questions Nutrition Intervention/ Education  Reassessment      () Pt has had class  () Pt has not had class      () Pt had questions  () Pt denies having questions Nutrition Intervention/ Education  Reassessment      () Pt has had class  () Pt has not had class      () Pt had questions  () Pt denies having questions Nutrition Intervention/ Education   Discharge      () Pt has had class  Date: See above  () Pt has not had class - Reason:    () Pt had questions that were answered   () Pt denies having questions             PSYCHO SOCIAL INITIAL ASSESSMENT      Depression:  () Self Reported  () In History  () S/Sx noted  (x) Denies  () On Medication  () Follows physician      (x) Give PHQ-9 Depression screening tool                  Dyspnea:  (x) Give pt UCSD SOB survey      (x) Pt gets SOB during activity and ADLs.           (x) Pt has support from home for the program        () Pt does not have support for the program   PSYCHO SOCIAL  PLAN      () Attend Stress/ Depression/ Anxiety Class                Pre Rehab PHQ-9 Score: 6  1-4 Normal  5-9 Mild  10-14 Mod  15-19 Mod-Sev  >19 Severe        Pre Rehab UCSD SOB Score: 58      (x) Attend classes and attend rehab to increase strength and endurance      -Attend Psychosocial class          () Speak with the patient/ family about ability to commit to the program with undue stress/ anxiety   PSYCHO SOCIAL  RE ASSESSMENT    () Pt scored >10 on PHQ-9.   Spoke with pt privately about issues and *             () Pt recommended to contact physician for assistance                See below for ADLs        () Pt has had class  () Pt has not had class        Re assessment of support:  Good, pt has been attending PSYCHO SOCIAL  RE ASSESSMENT    () Pt is coping well  () Pt needs more assistance-              () Pt recommended to contact physician for assistance                See below          () Pt has had class  () Pt has not had           Re assessment of support:  Good, pt has been attending PSYCHO SOCIAL  RE ASSESSMENT    () Pt is coping well  () Pt needs more assistance-              () Pt recommended to contact physician for assistance                See below          () Pt has had class  () Pt has not had class        Re assessment of support:  Good, pt has been attending   PSYCHO SOCIAL DISCHARGE      () Pt attended class     Date:              Post Rehab PHQ-9   Depression Score:                [facility]  Score:      () Pt is less SOB with ADLs            () Pt had class  Date: See above  () Pt did not have class        () Pt completed program  () Pt had to stop         Psychosocial Goals   Initial Assessment    (x) Maintain/ Decrease Depression Levels      (x) Maintain/ Decrease Anxiety Levels        (x) Learn about Emotional Health          (x) Increase QoL   (CAT tool)          (x) Give the CAT tool for HR QoL      Pre Rehab  CAT score:  12/ 40       Psychosocial Goals  Plan      () Attend Stress/ Anxiety/ Dyspnea - Panic control class      () Attend Stress/ Anxiety/ Dyspnea - Panic control class      () Attend Stress/ Anxiety/ Dyspnea - Panic control class      () Initiate KS, get stronger, more endurance and more confidence              30 day CAT score:  / 40     Psychosocial Goals  Reassessment      () Pt has had class  () Pt has not had class      () Pt has had class  () Pt has not had class      () Pt has had class  () Pt has not had class      () Pt is progressing  () Pt is not progressing              60 day CAT score:  / 40 Psychosocial Goals  Reassessment      () Pt has had class  () Pt has not had class      () Pt has had class  () Pt has not had class      () Pt has had class  () Pt has not had class      () Pt is progressing  () Pt is not progressing              90 day CAT score:  / 40 Psychosocial Goals  Reassessment      () Pt has had class  () Pt has not had class      () Pt has had class  () Pt has not had class      () Pt has had class  () Pt has not had class      () Pt is progressing  () Pt is not progressing              120 day CAT score:  / 40   Psychosocial Goals   Discharge      () Pt had class            () Pt had class            () Pt had class  Date: See above  () Pt did not have class      Post rehab CAT below                  Post Rehab   CAT score:  / 40              OUTCOMES    PHQ-9:  Did pt drop a severity level or maintain in the minimal range?  () Y  () N    UCSD SOB  Did pt decrease their number by 5 or more?  () Y  () N    CAT scores:  Did pt drop by 2 or more points from Pre to Post?  () Y  () N        Psychosocial Intervention/ Education   Initial Assessment    () Pt is being treated for depression/ anxiety        (x) Pt would benefit from [facility]s Psychosocial Issues Class (Stress, Depression, Anxiety, and Intimacy   Psychosocial  Intervention/ Education  Plan      () Pt to contact physician if any severe changes occur in mood        () Pt will attend [facility]’s class   Psychosocial Intervention/ Education Reassessment      () Pt is coping  well  () Pt is not coping well         () Pt has had class  () Pt has not had class Psychosocial Intervention/ Education Reassessment      () Pt is coping well  () Pt is not coping well         () Pt has had class  () Pt has not had class Psychosocial Intervention/ Education Reassessment      () Pt is coping well  () Pt is not coping well         () Pt has had class  () Pt has not had class   Psychosocial Intervention/ Education Discharge      () Pt did not need any changes   () Pt needed changes to treatment      () Pt had class  Date: See above  () Pt did not have class         Pain   Initial Assessment    (x) N/A  Location:   Duration:   Intensity:  /10  Type:    Pain   Plan      () N/A    () Pts pain is under control  () Pt encouraged to contact physician for pain control   Pain   Reassessment      () N/A    () Pts pain is under control  () Pt encouraged to contact physician for pain control Pain   Reassessment      () N/A    () Pts pain is under control  () Pt encouraged to contact physician for pain control Pain   Reassessment      () N/A    () Pts pain is under control  () Pt encouraged to contact physician for pain control Pain   Discharge      () N/A    () Pts pain is under control  () Pt encouraged to contact physician for pain control           OXYGEN   INITIAL ASSESSMENT    RESTING:  () RA  (x) O2: 1 L/min  () Continuous  () Breath Actuated  95 % SpO2 / 16 bpm    Prescribed Oxygen Use:  1 L/min at Rest and 3LPM with exertion    DME Company for O2:  [facility]     OXYGEN   PLAN      RESTING:  (x) Monitor needs, administer supplemental oxygen if SpO2 <88% OXYGEN   RE ASSESSMENT    RESTING:  (x) Pt SpO2 >87%  () Pt needs higher flow  () Pt needs less flow OXYGEN   RE ASSESSMENT    RESTING:  (x) Pt SpO2 >87%  () Pt needs higher flow  () Pt needs less flow OXYGEN   RE ASSESSMENT    RESTING:  (x) Pt SpO2 >87%  () Pt needs higher flow  () Pt needs less flow OXYGEN   DISCHARGE      RESTING:  () Pt SpO2 remained >87% on * L/min at rest    () Pts doctor and company contacted for change in Rx   Oxygen Goals   Initial Assessment    (x) Wean, Titrate down, or get off O2 if possible   Oxygen Goals   Plan      (x) Closely monitor SpO2 and HR using pulse oximetry for saturation and HR response, and titrate if appropriate   Oxygen Goals  Reassessment    () RA    On exertion:  () Pt maintaining FiO2  () Pt tolerating lower O2 needs  () Pt needing more O2   Oxygen Goals  Reassessment    () RA    On exertion:  () Pt maintaining FiO2  () Pt tolerating lower O2 needs  () Pt needing more O2 Oxygen Goals  Reassessment    () RA    On exertion:  () Pt maintaining FiO2  () Pt tolerating lower O2 needs  () Pt needing more O2     Oxygen Goals   Discharge    () RA    With Exertion:  () Pt maintained FiO2  () Pt was found to need less O2 - notification sent to physician  () Pt was found to need more O2, notification sent to physician     Oxygen Intervention/ Education  Initial Assessment    (x) Learn / Review about O2 use, safety and travel      () Pt does not wear or have home oxygen    Oxygen Intervention/ Education  Plan      () Attend O2 Use, safety and travel class        () Assess for SpO2 with each exercise   Oxygen Intervention/ Education  Reassessment      () Pt has had class  () Pt has not had class      () Pt is >87%  () Pt has been found to desaturate - physician notified Oxygen Intervention/ Education  Reassessment      () Pt has had class  () Pt has not had class      () Pt is >87%  () Pt has been found to desaturate - physician notified Oxygen Intervention/ Education  Reassessment      () Pt has had class  () Pt has not had class      () Pt is >87%  () Pt has been found to desaturate - physician notified   Oxygen Intervention/ Education  Discharge      () Pt had class  Date:   () Pt did not have class      () Pt did well  () Pt needed to be put on oxygen           TOBACCO USE  INITIAL ASSESSMENT    (x) Pt currently not smoking    () Pt currently smoking        () Pt needs Tobacco Cessation counseling          Smoked 2 ppd for 24 years  Pt quit in 1990.    TOBACCO USE  PLAN      () N/A      Does pt want to quit:   Does pt have a quit date:    () Tobacco Cessation counseling Education if applicable        () Encouraged to contact physician for tools/ nicotine replacement etc.   TOBACCO USE  RE ASSESSMENT    () N/A      Any change in Tobacco use status () N  ()Y      () Pt attended counseling education session            () Pt has contacted physician TOBACCO USE  RE ASSESSMENT    () N/A      Any change in Tobacco use status () N  ()Y      () Pt attended counseling education session            () Pt has contacted physician TOBACCO USE  RE ASSESSMENT    () N/A      Any change in Tobacco use status () N  ()Y      () Pt attended counseling education session            () Pt has contacted physician TOBACCO USE  DISCHARGE            Current Tobacco Use Status:         () Pt attended TC counseling education session  Date:           () Pt contacted physician        NRT product/ medication  :     Tobacco Use Goal  Initial Assessment      (x) Complete Cessation or Maintain Cessation of tobacco products   Tobacco Use Goal Intervention and Education Plan    (x Encourage pt to fight the urge, call quit line, use techniques learned from friends/ class    () Attend Tobacco Cessation class if needed   Tobacco Use  Reassessment          () Pt remains tobacco free            () Pt has had TC counseling session        () Pt still smoking Tobacco Use  Reassessment          () Pt remains tobacco free            () Pt has had TC counseling session        () Pt still smoking Tobacco Use  Reassessment          () Pt remains tobacco free            () Pt has had TC counseling session        () Pt still smoking Tobacco Use  Discharge          () Pt remains tobacco free            () Pt had TC counseling  Date:  See above        () Pt still smoking - gave resources for quitting             MEDICATIONS  (Oral & Inhaled)  INITIAL ASSESSMENT    Pt reports taking his meds properly 100% of the time      (x) Pt is on inhaled medications   MEDICATIONS  PLAN        -Review Rxs purpose, schedule, side effects and importance of compliance during Medication class. Also address Cpap, Vents.        MEDICATIONS  RE ASSESSMENT      Pt reports taking their meds properly  % of the time        () Pt has had class  () Pt has not had class MEDICATIONS  RE ASSESSMENT      Pt reports taking their meds properly  % of the time        () Pt has had class  () Pt has not had class MEDICATIONS  RE ASSESSMENT      Pt reports taking their meds properly  % of the time        () Pt has had class  () Pt has not had class MEDICATIONS  DISCHARGE        Pt reports taking their meds properly  % of the time        ()Pt had med class  Date:  () Pt has not had class       Pt has:  (x) Metered Dose Inhaler  (x) Dry Powder Inhaler  (x) Nebulizer   -Review correct use, timing, technique and cleaning of equipment during Medication class () Pt has had class  () Pt has not had class () Pt has had class  () Pt has not had class () Pt has had class  () Pt has not had class () Pt had class  Date: See above  () Pt has not had class   Medication Goals  Initial Assessment        (x) Take meds properly 100% of the time    (x) Learn about / Review Rxs, and devices Medication Goals  Intervention & Education  Plan      -Follow Rx instructions and ask if questions    -Attend Medication Education class   Medication Goals  Re assessment          () Readdressed questions on any medications    () Pt has had class  () Pt has not had class Medication Goals  Re assessment          () Readdressed questions on any medications    () Pt has had class  () Pt has not had class Medication Goals  Re assessment          () Readdressed questions on any medications    () Pt has had class  () Pt has not had class Medication Goals  Discharge          % proper med usage see above      () Pt had class  Date: See above  () Pt has not had class             ADL  INITIAL ASSESSMENT      (x) Impaired ADL ability  () Need for Assist Devices  (x) Generalized weakness, low functional capacity  (x) Stairs in house               ADL  PLAN        -Initiate ID, RT, and chair squats  -Breathing retraining, PLB, and pacing      -Encourage home activity               ADL  RE ASSESSMENT      () Pt is progressing  () Pt is not progressing        () Pt has initiated home activity  () Pt has not yet initiated  home activity-      () ADLs getting easier  () ADLs not getting easier   ADL  RE ASSESSMENT      () Pt is progressing  () Pt is not progressing        () Pt has initiated home activity  () Pt has not yet initiated  home activity-      () ADLs getting easier  () ADLs not getting easier ADL  RE ASSESSMENT      () Pt is progressing  () Pt is not progressing        () Pt has initiated home activity  () Pt has not yet initiated  home activity-      () ADLs getting easier  () ADLs not getting easier ADL  DISCHARGE        () Pt showed strength and endurance gains  () Pt did not progress      () Pt doing recommended home activity  () Pt not doing home activitiy         ADL Goals   Initial Assessment      (x) Decrease SOB with ADLs              (x) Decreased SOB overall      ADL Goals Intervention/ Education Plan      -Initiate ID, RT and chair squats and increase pts strength and endurance        -Pacing, Breathing retraining       ADL Goals Reassessment        () ADLs getting easier  () ADLs not getting easier          () Pt states activities are getting easier/ able to go longer/ not get as SOB   ADL Goals Reassessment        () ADLs getting easier  () ADLs not getting easier          () Pt states activities are getting easier/ able to go longer/ not get as SOB ADL Goals Reassessment        () ADLs getting easier  () ADLs not getting easier          () Pt states activities are getting easier/ able to go longer/ not get as SOB   ADL Goals   Discharge        Goal achieved?  () Yes  () No            Goal achieved?  () Yes  () No          Outcomes:  See Health and Functioning from the CAT tool and Strength and Endurance from 6 MWD above             EXACERBAT'N PREVENTION & MANAGEMENT  INITIAL ASSESSMENT      Pt reports;  () 0 respiratory infections  () 1   (x) >2  () Hospitalized in last 12 months   EXACERBAT'N PREVENTION & MANAGEMENT  PLAN        Address via Disease Self Management and Exacerbation prevention class:    -Hydration for mucus    -Hand Hygiene          -Evaluation of Sputum    -When to call MD  -S/Sx to report  -Decrease exposure to irritants/ exacerbators    -Cleaning of respiratory equipment   EXACERBAT'N PREVENTION & MANAGEMENT  RE ASSESSMENT      () Pt has had class  () Pt has not had class        () Improved hydration    () Correct hand washing techs and alcohol gel usage    () Sputum assessment    () Ability to recognize exacerbation and when to call MD        () Cleaning of respiratory equipment EXACERBAT'N PREVENTION & MANAGEMENT  RE ASSESSMENT      () Pt has had class  () Pt has not had class        () Improved hydration    () Correct hand washing techs and alcohol gel usage    () Sputum assessment    () Ability to recognize exacerbation and when to call MD        () Cleaning of respiratory equipment EXACERBAT'N PREVENTION & MANAGEMENT  RE ASSESSMENT      () Pt has had class  () Pt has not had class        () Improved hydration    () Correct hand washing techs and alcohol gel usage    () Sputum assessment    () Ability to recognize exacerbation and when to call MD        () Cleaning of respiratory equipment EXACERBAT'N PREVENTION & MANAGEMENT  DISCHARGE        () Pt has had class  Date:  () Pt did not have class              () Addressed questions and pt feels comfortable with hydration, hand washing, sputum assessment, exacerbation knowledge, and cleaning of equipment   Exacerbation Prevention & Management Goals  Initial Assessment      (x) Learn ways to stay healthy and not get admitted          (x) Increase knowledge of Lungs, Breathing, Exercise, Nutrition, Mood and controlling anxiety, and stopping the Dyspnea Cycle by attending classes   Exacerbation Prevention & Management Goals Intervention/ Education  Plan    -Attend class and demonstrate disease self management strategies      -Attend all appropriate classes                   Exacerbation Prevention & Management Goals  Reassessment        () Pt has had class  () Pt has not had class        () Pt has had all classes  () Pt has had some classes  () Pt has had little/ not staying for education Exacerbation Prevention & Management Goals  Reassessment        () Pt has had class  () Pt has not had class        () Pt has had all classes  () Pt has had some classes  () Pt has had little/ not staying for education Exacerbation Prevention & Management Goals  Reassessment        () Pt has had class  () Pt has not had class        () Pt has had all classes  () Pt has had most classes  () Pt has had some of the classes  () Pt has had little/ not staying for education Exacerbation Prevention & Management Goals  Discharge        () Pt had class  Date:  See above  () Pt did not have class        () Pt attended all relevant classes and all questions were answered                   PHYSICIAN INTERACTION    MD Initial Assessment Review    (x) Initial  Assessment Reviewed  (x) Treatment Plan and Goals support patient needs/ abilities                  Cosigned and dated below:   Sharad Bey MD 30 day and Plan Review:      (x) I have seen the patient in rehab during this 30 day reassessment  (x) I agree with the plan  (x) Continue with progression and instruction  () Continue, but with the following changes:      Cosigned and dated below: Sharad Bey MD 30 day Reassessment Review:    (x) I have seen the patient in rehab during this 30 day reassessment  (x) Continue with the current program  () Continue, but with the following changes:  () Discharge patient      Cosigned and dated below: Rosa Cowden MD 30 day Reassessment Review:    (x) I have seen the patient in rehab during this 30 day reassessment  (x) Continue with the current program  () Continue, but with the following changes:  () Discharge patient      Cosigned and dated below: Rosa Cowden MD 30 day Reassessment Review:    (x) I have seen the patient in rehab during this 30 day reassessment  (x) Continue with the current program  () Continue, but with the following changes:  () Discharge patient      Cosigned and dated below: Rosa Cowden MD 30 day   Discharge Review:    (x) Discharge patient                            Cosigned and dated below:

## 2023-01-24 NOTE — PROGRESS NOTES
[id number]  INITIAL EVALUATION INTERVIEW    Name:  Jah Joe, : 1945    COVID -19 Screen  Do you have any of the following symptoms:  [] Fever [] Cough [] SOB [] Muscle/Body Ache [] Loss of taste/smell [x] None    Have you traveled outside of the [address] [place] [address] [address]? [] Yes      [x] No    Have you been around anyone who has tested Positive for COVID 19?  [] Yes      [x] No        MEDICAL HISTORY  Diagnosis: Severe COPD, Gold Stage 3. Patients medical history:  Please see H&P from ordering physician (which is in the chart)  Patients family history:  Please see H&P   Respiratory History:  First diagnosed in [facility] Last Hospitalization/ Urgent Care/ ED: 2021  Medications reviewed in [facility]:  Yes  Vaccinations:  Please see H&P  Allergies:  Please see H&P  Oxygen Rx at Rest:  1L/min (x) Continuous  () OCD. With Exercise:  3L/min   () Continuous  (x) OCD     Home CPAP or BIPAP:   No     Pack Year Smoking history (http://smokingpackyears. 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 Alcohol or other substance abuse issues: Please see H&P. Does [facility] have social support from family/friends: Yes    PHYSICAL ASSESSMENT          BP: 120/78. SpO2: 95% on 1 L/min (x) continuous O2 () OCD. HR: 80.    Breathing Pattern:  Regular/Normal (<12): ()Y ()N. Ineffective (fast/shallow/using accessory muscles ()Y. Respiratory Rate: 16/min. For Chest Exam, Cardiac Exam, Finger Clubbing, UB and LB evaluation, please see Physicians H&P which is included in the chart. DIAGNOSTIC TESTS                        PFT:  For PFT and physicians interpretation, please see the test results which are included in the chart. SYMPTOM ASSESSMENT  Dyspnea:  1= Not troubled by breathlessness except on strenuous exercise. 2= SOB when hurrying/ walking up hill/ stairs.   3= Walks slower than peers on the level because of breathlessness or has to stop for breath when walking at own pace. 4= Stops for breath after 100m or after a few minutes. 5= Too breathless to leave the house or breathless when dressing/ undressing:  -What level is patient:  3-4. What brings dyspnea on:  exertion. How often does this happen?: not every day, sometimes does not bother him at all. How intense out of 5 is it: 3. How long does it last?: <1 min. Fatigue:  none (), mild (x), moderate (), high (). Cough/ Sputum Production:  (x) No cough    () Non productive cough  () Productive cough only w/ infection    () Productive cough daily <1 Tbsp    () Productive cough daily >1Tbsp    () Nasal congestion    Wheeze:  (x) never, () rarely, () occasionally, () often, () only during activity  Blood in phlegm:  (x) never, () rarely, () occasionally, () often  Chest Pain:  (x) never, () rarely, () occasionally, () often. Postnasal drainage:  () never, (x) rarely, () occasionally, () often  Reflux/ Heartburn: (x) never, () rarely, () occasionally, () often  Swelling in ankles:  (x) never, () rarely, () occasionally, () often  Trouble swallowing:  (x) never, () rarely, () occasionally, () often  Extremity pain:  (x) never, () rarely, () occasionally, () often  Feelings of Anxiety, Panic, Fear or Isolation:  (x) never, () rarely, () occasionally, () often  Symptoms of Depression per pt:  (x) never, () rarely, () occasionally, () often    MUSCULOSKELETAL and EXERCISE ASSESSMENT      Any Physical Limitations/ Problems? : () Strength, () ROM, () Posture, () Functional ability, () Orthopedic limits, () Transferring abilities, (x) Exercise Tolerance, () Exercise Hypoxia, () Gait and Balance    PAIN ASSESSMENT  Location: none    ADL ASSESSMENT   Please see ADL's assessed using [facility] SOBQ in the chart    NUTRITION ASSESSMENT  Height: 5'10\" . Weight:  217.4 lbs. BMI: 31.2.   Any recent weight changes?: Yes- has lost 15lbs since doing pulmonary rehab a year ago. Diet habits (type, patterns, etc.); Normal diet. Who does the shopping/ preparing?: patient and his spouse. Fluid intake:  6-8 cups/day. Does patient take supplemental nutrition or vitamins?: No.    EDUCATIONAL ASSESSMENT  Knowledge of disease and its treatment via the Health Knowledge Test and self report:  () lacking  () good  (x) very good. Barriers to learning: () Speech  () Hearing  () Vision  () Literacy  () Cognitive  (X) Ready to Learn. Cultural Diversity issues?   No        REHABILITATION POTENTIAL:  excellent (poor, fair, good, excellent)

## 2023-01-31 ENCOUNTER — HOSPITAL ENCOUNTER (OUTPATIENT)
Dept: CARDIAC REHAB | Age: 78
Setting detail: THERAPIES SERIES
Discharge: HOME | End: 2023-01-31
Payer: MEDICARE

## 2023-01-31 PROCEDURE — 94626 PHY/QHP OP PULM RHB W/MNTR: CPT

## 2023-02-02 ENCOUNTER — HOSPITAL ENCOUNTER (OUTPATIENT)
Dept: CARDIAC REHAB | Age: 78
Setting detail: THERAPIES SERIES
Discharge: HOME | End: 2023-02-02
Payer: MEDICARE

## 2023-02-02 PROCEDURE — 94626 PHY/QHP OP PULM RHB W/MNTR: CPT

## 2023-02-07 ENCOUNTER — HOSPITAL ENCOUNTER (OUTPATIENT)
Dept: CARDIAC REHAB | Age: 78
Setting detail: THERAPIES SERIES
Discharge: HOME | End: 2023-02-07
Payer: MEDICARE

## 2023-02-07 PROCEDURE — 94626 PHY/QHP OP PULM RHB W/MNTR: CPT

## 2023-02-09 ENCOUNTER — HOSPITAL ENCOUNTER (OUTPATIENT)
Dept: CARDIAC REHAB | Age: 78
Setting detail: THERAPIES SERIES
Discharge: HOME | End: 2023-02-09
Payer: MEDICARE

## 2023-02-09 PROCEDURE — 94626 PHY/QHP OP PULM RHB W/MNTR: CPT

## 2023-02-14 ENCOUNTER — HOSPITAL ENCOUNTER (OUTPATIENT)
Dept: CARDIAC REHAB | Age: 78
Setting detail: THERAPIES SERIES
Discharge: HOME | End: 2023-02-14
Payer: MEDICARE

## 2023-02-14 PROCEDURE — 94626 PHY/QHP OP PULM RHB W/MNTR: CPT

## 2023-02-16 ENCOUNTER — HOSPITAL ENCOUNTER (OUTPATIENT)
Dept: CARDIAC REHAB | Age: 78
Setting detail: THERAPIES SERIES
End: 2023-02-16
Payer: MEDICARE

## 2023-02-21 ENCOUNTER — HOSPITAL ENCOUNTER (OUTPATIENT)
Dept: CARDIAC REHAB | Age: 78
Setting detail: THERAPIES SERIES
Discharge: HOME | End: 2023-02-21
Payer: MEDICARE

## 2023-02-21 PROCEDURE — 94626 PHY/QHP OP PULM RHB W/MNTR: CPT

## 2023-02-21 NOTE — PLAN OF CARE
[facility] for COPD   INDIVIDUAL TREATMENT PLAN (ITP)     Name: Jose Lombardo   :  1945  Acct Number: [[id number]]   AGE: 65 y.o. Diagnosis:  Severe COPD, which is GOLD Stage 3                                               PFT:  Post Bronchodilator FEV1/FVC: 65  FEV1: 37    Patient Goals:  (x) Breathe better  (x) Increase my endurance (x) Have more energy  (x) Rely less on others  (x) Ease ADLs  (x) Understand and use meds correctly  (x) Control cough  (x) Make fewer visits to hospital  () Quit smoking  (x) Feel less anxious / have more confidence    Glossary:  TN=Pulmonary Rehab  PF=Physical Fitness TM=Treadmill  AD=Schwinn Airdyne  UBE=UpperBody Ergometer  RT=Resistance Training  PLB=Pursed Lip Breathing      COVID -19 Screen  Do you have any of the following symptoms:  [] Fever [] Cough [] SOB [] Muscle/Body Ache [] Loss of taste/smell [x] None    Have you traveled outside of the [place]? [] Yes      [x] No    Have you been around anyone who has tested Positive for COVID 19?  [] Yes      [x] No    The following Education has been completed with patient. [x] Wait in the lobby until we call you back to rehab. [x] Bring your own bottle of water with you. [x] You can not bring anyone with you in to the rehab clinic at this time. They are welcome to sit in the lobby or wait in the car.       INDIVIDUAL TREATMENT PLAN    INITIAL ASSESSMENT    Day #1 INDIVIDUAL TREATMENT PLAN    RE ASSESSMENT    Day #2-30 INDIVIDUAL TREATMENT PLAN    RE ASSESSMENT    Day #31-60 INDIVIDUAL TREATMENT PLAN    RE ASSESSMENT    Day #61-90 INDIVIDUAL TREATMENT PLAN    RE ASSESSMENT    Day # INDIVIDUAL TREATMENT PLAN      DISCHARGE    Last Day        Date: 2023     Date: 23   Date:    Date:    Date:    Date:    EXERCISE   INITIAL ASSESSMENT      Prescribed Oxygen Use  Activity: 3 L/min  () Continuous  (x) Breath Actuated      Oxygen Titration  (x) Assess for desaturation            Current Home Exercise:  None   EXERCISE  PLAN        Current Oxygen Use  Activity: 3 L/mi  (x) Continuous  (x) Breath Actuated      Oxygen Titration  (x) Maintaining >87% Spo2  () Not maintaining -  L/min needed on       Current Home Exercise:  Frequency:  2x/wk  Intensity:  4/10  Duration: 30 min  Mode: walk   EXERCISE  RE ASSESSMENT      Current Oxygen Use  Activity:  L/min  () Continuous  () Breath Actuated      Oxygen Titration  () Maintaining >87% Spo2  () Not maintaining -  L/min needed on       Current Home Exercise:  Frequency:  x/wk  Intensity:  /10  Duration:  min  Mode: EXERCISE  RE ASSESSMENT      Current Oxygen Use  Activity:  L/min  () Continuous  () Breath Actuated      Oxygen Titration  () Maintaining >87% Spo2  () Not maintaining -  L/min needed on       Current Home Exercise:  Frequency:  x/wk  Intensity:  /10  Duration:  min  Mode: EXERCISE  RE ASSESSMENT      Current Oxygen Use  Activity:  L/min  () Continuous  () Breath Actuated      Oxygen Titration  () Maintaining >87% Spo2  () Not maintaining -  L/min needed on       Current Home Exercise:  Frequency:  x/wk  Intensity:  /10  Duration:  min  Mode:   EXERCISE  DISCHARGE        Final Oxygen Use  Activity:  L/min  () Continuous  () Breath Actuated      Oxygen Titration  () Maintaining >87% Spo2  () Not maintaining -  L/min needed on       Current Home Exercise:  Frequency:  x/wk  Intensity:  /10  Duration:  min  Mode:       6 Minute Walk Test (6MWT):  O2 used: 3LPM nasal cannula   1060 ft walked = 2.5 METs  SpO2: 89-97%  HR: 80 - 119  RPD: 4  RPE: 5  Number of Breaks: none   Avg time for break:  n/a  Assist device used: none         6 Minute Walk Test (6MWT):  O2 used:   ft walked =  METs  SpO2:  %  HR:    RPD:   RPE:  Number of Breaks:    Avg time for break:  secs  Assist device used:             OUTCOMES:  6MWD Improvement:  > 98'?  () Yes  () No     Exercise Prescription    THR: 71 - 113 bpm    Frequency:  2-3x/wk in [place], 1-3x/wk home activity    Intensity:  METs (from 6MWT)      Time:  15-30 total minutes up to 55 minutes max    Type:  Aerobic (TM, AD, UBE, NuStep), 6 ST, RT 1-10# 8-15 reps    Progression:  Increase 30s - 2 min/mode for Aerobic activity, and increase Intensity 2-5% each week if RPE <5, RPD <5, SpO2 >87% and pt is within [facility] above. Exercise Prescription    (x) Pt exercising within THR    Frequency:  2-3x/wk in PA, 1-3x/wk home activity    Intensity:  3-5 on Greta Dyspnea/ Fatigue scale    Time:  15-30 total minutes up to 55 minutes max    Type:  Aerobic (TM, AD, UBE, NuStep), 6 ST, RT 1-10# 8-15 reps    Progression:  Increase 30s - 2 min/mode for Aerobic activity, and increase Intensity 2-5% each week if RPE <5, RPD <5, SpO2 >87% and pt is within [facility] above. Exercise Prescription    () Pt exercising within THR    Frequency:  2-3x/wk in PA, 2-5x/wk home activity    Intensity:  3-5 on Greta Dyspnea/ Fatigue scale    Time:  30-45 total minutes up to 55 minutes max    Type:  Aerobic (TM, AD, UBE, NuStep), 6 ST, RT 1-10# 8-15 reps    Progression:  Increase 30s - 2 min/mode for Aerobic activity, and increase Intensity 2-5% each week if RPE <5, RPD <5, SpO2 >87% and pt is within [facility] above. Exercise Prescription    () Pt exercising within THR    Frequency:  2-3x/wk in PA, 2-5x/wk home activity    Intensity:  3-5 on Greta Dyspnea/ Fatigue scale    Time:  30-45 total minutes up to 55 minutes max    Type:  Aerobic (TM, AD, UBE, NuStep), 6 ST, RT 1-10# 8-15 reps    Progression:  Increase 30s - 2 min/mode for Aerobic activity up to 35 minutes, and increase Intensity 2-5% each week if RPE <5, RPD <5, SpO2 >87% and pt is within [facility] above.  Exercise Prescription    () Pt exercising within THR    Frequency:  2-3x/wk in [place], 2-5x/wk home activity    Intensity:  3-5 on Greta Dyspnea/ Fatigue scale    Time:  30-45 total minutes up to 55 minutes max    Type:  Aerobic (TM, AD, UBE, NuStep), 6 ST, RT 1-10# 8-15 reps    Progression: Increase 30s - 2 min/mode for Aerobic activity up to 35 minutes, and increase Intensity 2-5% each week if RPE <5, RPD <5, SpO2 >87% and pt is within [facility] above. Home Program          (x) Given to patient with current levels, recommendation and guidelines.                                        Initial Levels:  TM:1.9mph,6 min  AD:1.0level,6 min  NuStep:56W,  6 min  UBE:0.5level, 5 min  RT 3#, 8-15 reps   Current Levels:  TM: 1.8 mph,  10min  AD: 32 level,  10 min  UBE: 30level, 5 min  RT  4#, 8-15 reps   Current Levels:  TM:  mph,  min  AD:  level,  min  NuStep:  W,  min  UBE:  level, 5 min  RT  #, 8-15 reps Current Levels:  TM:  mph,  min  AD:  level,  min  NuStep:  W,  min  UBE:  level, 5 min  RT  #, 8-15 reps Current Levels:  TM:  mph,  min  AD:  level,  min  NuStep:  W,  min  UBE:  level, 5 min  RT  #, 8-15 reps   Final Levels:  TM:  mph,  min  AD:  level,  min  NuStep:  W,  min  UBE:  level, 5 min  RT  #, 8-15 reps     Physical Limitations  Initial Assessment    () Weakness  () Inflexible  () Poor posture  () Gait abnormality  () Balance  () Orthopedic Issues   Physical Limitation  Plan      (x) Strengthen through squats and RT  (x) Appropriate stretches shown  (x)Verbal cues and reminders for posture and gait given  (x) Proper modalities chosen for ortho issues  (x) Give Home activity / stretches/ Warmup/ [place] info   Physical Limitations Reassessment      () Pt progressing  () Pt not progressing Physical Limitations Reassessment      () Pt progressing  () Pt not progressing Physical Limitations Reassessment      () Pt progressing  () Pt not progressing     Physical Limitations  Discharge      () Issues Addressed  () PT/OT suggested       Exercise Goals   Initial Assessment:    (x) Start to, and commit to exercising regularly     (x) Learn about home activity recommendations        (x) Increase PF shown by increasing 6MWD   Exercise Goals   Plan      -Initiate TN 2x/week  -Encourage home exercise    -Attend Home Activity EDUCATION class      -Slowly, safely, progress in MS    Exercise Goals   Reassessment      () Pt is coming regularly  () Pt is sporadic    () Pt has had class  () Pt has not had class        () Pt is progressing  () Pt is not progressing Exercise Goals   Reassessment      () Pt is coming regularly  () Pt is sporadic    () Pt has had class  () Pt has not had class        () Pt is progressing  () Pt is not progressing Exercise Goals   Reassessment      () Pt is coming regularly  () Pt is sporadic    () Pt has had class  () Pt has not had class        () Pt is progressing  () Pt is not progressing   Exercise Goals Discharge      () GOAL Met?  () GOAL not met -      () Pt had EDUCATION class  Date:   () Pt did not have class    () GOAL Met  See 6MWD above  () GOAL not Met:     Exercise Intervention Initial Assessment      () Transportation needed        (x) EDUCATION needed          (x) Motivation needed   Exercise Intervention/ Education   Plan    Patient declined education    () [facility] set up        () EDUCATION:  Home activity class to be given        () Positive encouragement, support and motivation to be given   Exercise Intervention/ Education Reassessment      () Pt is attending  () Pt is not attending      () Pt has had class  () Pt has not had class      () Pt is progressing  () Pt not progressing Exercise Intervention/ Education Reassessment      () Pt is attending  () Pt is not attending      () Pt has had class  () Pt has not had class      () Pt is progressing  () Pt not progressing Exercise Intervention/ Education Reassessment      () Pt is attending  () Pt is not attending      () Pt has had class  () Pt has not had class      () Pt is progressing  () Pt not progressing Exercise Intervention/ Edcuation Discharge      () Pt attended most  () Pt cancelled a lot    () Pt has had class  Date: See above  () Pt did not have class    () Pt progressed and did well  () Pt had difficulty- NUTRITION   INITIAL ASSESSMENT      Height:  510   Weight: 217.4 lbs  BMI: 31.2    30 day goal: 215 Lbs (2-4lbs)    (x) Overweight  () Underweight  () Normal  () Teeth issues  () GI issues  (x) Nutrition knowledge needed  (x) DM   NUTRITION   PLAN        Current Weight:  213.8lbs      Goal achieved?:Y  Next 30 day goal: 210Lbs   NUTRITION  RE ASSESSMENT      Current Weight:  lbs      Goal achieved?:  Next 30 day goal: Lbs NUTRITION  RE ASSESSMENT      Current Weight:  lbs      Goal achieved?:  Next 30 day goal: Lbs NUTRITION  RE ASSESSMENT      Current Weight:  lbs      Goal achieved?:  Next 30 day goal: Lbs   NUTRITION DISCHARGE        Current Weight:  lbs  BMI:    Goal achieved?:     Nutrition Goals  Initial Assessment    (x) Pt is DM.   Increase nutrition knowledge and glucose control through diet and exercise      (x) Learn weight strategies to lose weight        (x) Learn about general nutrition          -Achievable weight goal for the end of the program:  205 Lbs (2-4lbs per month recommended)   Nutrition Goals   Plan    Patient declined education    () DM:  Attend nutrition class and learn about quality carbohydrates and exercises role in DM    () Attend nutrition class          () Attend nutrition class          (x) Initiate exercise and give pt hints and tips for weight and will have class for information   Nutrition Goals  Reassessment      () DM: Pt has had class  () DM: Pt has not had class           () Pt has had class  () Pt has not had class       () Pt has had class  () Pt has not had class       () Pt progressing  () Pt not progressing     Nutrition Goals  Reassessment      () DM: Pt has had class  () DM: Pt has not had class           () Pt has had class  () Pt has not had class       () Pt has had class  () Pt has not had class       () Pt progressing  () Pt not progressing     Nutrition Goals  Reassessment      () DM: Pt has had class  () DM: Pt has not had class           () Pt has had class  () Pt has not had class       () Pt has had class  () Pt has not had class       () Pt progressing  () Pt not progressing     Nutrition Goals  Discharge      () Pt had nutrition class  Date:  () Pt has not had class        () Pt has had class  Date: See above  () Pt has not had class    () Pt has class  Date: See above  () Pt has not had class      () Final weight goal achieved  () Pt did not reach desired weight goal - (readdressed nutrition and exercise strategies for future goal attainment)        Nutrition Intervention/ Education   Initial Assessment    (x) Pt needs Nutrition education class        (x) Pt states does not need class       Nutrition Intervention/ Education  Plan    Patient declined education    () Pt will have Nutrition class          (x) Encourage pt to continue to learn and incorporate self knowledge in to diet choices   Nutrition Intervention/ Education  Reassessment      () Pt has had class  () Pt has not had class      () Pt had questions  () Pt denies having questions Nutrition Intervention/ Education  Reassessment      () Pt has had class  () Pt has not had class      () Pt had questions  () Pt denies having questions Nutrition Intervention/ Education  Reassessment      () Pt has had class  () Pt has not had class      () Pt had questions  () Pt denies having questions Nutrition Intervention/ Education   Discharge      () Pt has had class  Date: See above  () Pt has not had class - Reason:    () Pt had questions that were answered   () Pt denies having questions             PSYCHO SOCIAL INITIAL ASSESSMENT      Depression:  () Self Reported  () In History  () S/Sx noted  (x) Denies  () On Medication  () Follows physician      (x) Give PHQ-9 Depression screening tool                  Dyspnea:  (x) Give pt UC[employer] SOB survey      (x) Pt gets SOB during activity and ADLs.           (x) Pt has support from home for the program        () Pt does not have support for the program   PSYCHO SOCIAL  PLAN      () Attend Stress/ Depression/ Anxiety Class      Patient declined education          Pre Rehab PHQ-9   Score: 6  1-4 Normal  5-9 Mild  10-14 Mod  15-19 Mod-Sev  >19 Severe        Pre Rehab UCSD SOB Score: 58      () Attend classes and attend rehab to increase strength and endurance      -Attend Psychosocial class          () Speak with the patient/ family about ability to commit to the program with undue stress/ anxiety   PSYCHO SOCIAL  RE ASSESSMENT    () Pt scored >10 on PHQ-9.   Spoke with pt privately about issues and *             () Pt recommended to contact physician for assistance                See below for ADLs        () Pt has had class  () Pt has not had class        Re assessment of support:  Good, pt has been attending PSYCHO SOCIAL  RE ASSESSMENT    () Pt is coping well  () Pt needs more assistance-              () Pt recommended to contact physician for assistance                See below          () Pt has had class  () Pt has not had           Re assessment of support:  Good, pt has been attending PSYCHO SOCIAL  RE ASSESSMENT    () Pt is coping well  () Pt needs more assistance-              () Pt recommended to contact physician for assistance                See below          () Pt has had class  () Pt has not had class        Re assessment of support:  Good, pt has been attending   PSYCHO SOCIAL DISCHARGE      () Pt attended class     Date:              Post Rehab PHQ-9   Depression Score:                [facility]  Score:      () Pt is less SOB with ADLs            () Pt had class  Date: See above  () Pt did not have class        () Pt completed program  () Pt had to stop         Psychosocial Goals   Initial Assessment    (x) Maintain/ Decrease Depression Levels      (x) Maintain/ Decrease Anxiety Levels        (x) Learn about Emotional Health          (x) Increase QoL   (CAT tool)          (x) Give the CAT tool for HR QoL      Pre Rehab  CAT score:  12/ 40 Psychosocial Goals  Plan    Patient declined education    () Attend Stress/ Anxiety/ Dyspnea - Panic control class      () Attend Stress/ Anxiety/ Dyspnea - Panic control class      () Attend Stress/ Anxiety/ Dyspnea - Panic control class      (x) Initiate TX, get stronger, more endurance and more confidence              30 day CAT score:  13/ 40     Psychosocial Goals  Reassessment      () Pt has had class  () Pt has not had class      () Pt has had class  () Pt has not had class      () Pt has had class  () Pt has not had class      () Pt is progressing  () Pt is not progressing              60 day CAT score:  / 40 Psychosocial Goals  Reassessment      () Pt has had class  () Pt has not had class      () Pt has had class  () Pt has not had class      () Pt has had class  () Pt has not had class      () Pt is progressing  () Pt is not progressing              90 day CAT score:  / 40 Psychosocial Goals  Reassessment      () Pt has had class  () Pt has not had class      () Pt has had class  () Pt has not had class      () Pt has had class  () Pt has not had class      () Pt is progressing  () Pt is not progressing              120 day CAT score:  / 40   Psychosocial Goals   Discharge      () Pt had class            () Pt had class            () Pt had class  Date: See above  () Pt did not have class      Post rehab CAT below                  Post Rehab   CAT score:  / 40              OUTCOMES    PHQ-9:  Did pt drop a severity level or maintain in the minimal range?  () Y  () N    UCSD SOB  Did pt decrease their number by 5 or more?  () Y  () N    CAT scores:  Did pt drop by 2 or more points from Pre to Post?  () Y  () N        Psychosocial Intervention/ Education   Initial Assessment    () Pt is being treated for depression/ anxiety        (x) Pt would benefit from TXs Psychosocial Issues Class (Stress, Depression, Anxiety, and Intimacy   Psychosocial  Intervention/ Education  Plan      () Pt to contact physician if any severe changes occur in mood        () Pt will attend IDs class      Patient declined education Psychosocial Intervention/ Education Reassessment      () Pt is coping well  () Pt is not coping well         () Pt has had class  () Pt has not had class Psychosocial Intervention/ Education Reassessment      () Pt is coping well  () Pt is not coping well         () Pt has had class  () Pt has not had class Psychosocial Intervention/ Education Reassessment      () Pt is coping well  () Pt is not coping well         () Pt has had class  () Pt has not had class   Psychosocial Intervention/ Education Discharge      () Pt did not need any changes   () Pt needed changes to treatment      () Pt had class  Date: See above  () Pt did not have class         Pain   Initial Assessment    (x) N/A  Location:   Duration:   Intensity:  /10  Type:    Pain   Plan      (x) N/A    () Pts pain is under control  () Pt encouraged to contact physician for pain control   Pain   Reassessment      () N/A    () Pts pain is under control  () Pt encouraged to contact physician for pain control Pain   Reassessment      () N/A    () Pts pain is under control  () Pt encouraged to contact physician for pain control Pain   Reassessment      () N/A    () Pts pain is under control  () Pt encouraged to contact physician for pain control Pain   Discharge      () N/A    () Pts pain is under control  () Pt encouraged to contact physician for pain control           OXYGEN   INITIAL ASSESSMENT    RESTING:  () RA  (x) O2: 1 L/min  () Continuous  () Breath Actuated  95 % SpO2 / 16 bpm    Prescribed Oxygen Use:  1 L/min at Rest and 3LPM with exertion    DME Company for O2:  [facility]     OXYGEN   PLAN      RESTING:  (x) Monitor needs, administer supplemental oxygen if SpO2 <88% OXYGEN   RE ASSESSMENT    RESTING:  (x) Pt SpO2 >87%  () Pt needs higher flow  () Pt needs less flow OXYGEN   RE ASSESSMENT    RESTING:  (x) Pt SpO2 >87%  () Pt needs higher flow  () Pt needs less flow OXYGEN   RE ASSESSMENT    RESTING:  (x) Pt SpO2 >87%  () Pt needs higher flow  () Pt needs less flow OXYGEN   DISCHARGE      RESTING:  () Pt SpO2 remained >87% on * L/min at rest    () Pts doctor and company contacted for change in Rx   Oxygen Goals   Initial Assessment    (x) Wean, Titrate down, or get off O2 if possible   Oxygen Goals   Plan      (x) Closely monitor SpO2 and HR using pulse oximetry for saturation and HR response, and titrate if appropriate   Oxygen Goals  Reassessment    () RA    On exertion:  () Pt maintaining FiO2  () Pt tolerating lower O2 needs  () Pt needing more O2   Oxygen Goals  Reassessment    () RA    On exertion:  () Pt maintaining FiO2  () Pt tolerating lower O2 needs  () Pt needing more O2 Oxygen Goals  Reassessment    () RA    On exertion:  () Pt maintaining FiO2  () Pt tolerating lower O2 needs  () Pt needing more O2     Oxygen Goals   Discharge    () RA    With Exertion:  () Pt maintained FiO2  () Pt was found to need less O2 - notification sent to physician  () Pt was found to need more O2, notification sent to physician     Oxygen Intervention/ Education  Initial Assessment    (x) Learn / Review about O2 use, safety and travel      () Pt does not wear or have home oxygen    Oxygen Intervention/ Education  Plan      () Attend O2 Use, safety and travel class        (x) Assess for SpO2 with each exercise   Oxygen Intervention/ Education  Reassessment      () Pt has had class  () Pt has not had class      () Pt is >87%  () Pt has been found to desaturate - physician notified Oxygen Intervention/ Education  Reassessment      () Pt has had class  () Pt has not had class      () Pt is >87%  () Pt has been found to desaturate - physician notified Oxygen Intervention/ Education  Reassessment      () Pt has had class  () Pt has not had class      () Pt is >87%  () Pt has been found to desaturate - physician notified   Oxygen Intervention/ Education  Discharge      () Pt had class  Date:   () Pt did not have class      () Pt did well  () Pt needed to be put on oxygen           TOBACCO USE  INITIAL ASSESSMENT    (x) Pt currently not smoking    () Pt currently smoking        () Pt needs Tobacco Cessation counseling          Smoked 2 ppd for 24 years  Pt quit in 1990.    TOBACCO USE  PLAN      (x) N/A      Does pt want to quit:   Does pt have a quit date:    () Tobacco Cessation counseling Education if applicable        () Encouraged to contact physician for tools/ nicotine replacement etc.   TOBACCO USE  RE ASSESSMENT    () N/A      Any change in Tobacco use status () N  ()Y      () Pt attended counseling education session            () Pt has contacted physician TOBACCO USE  RE ASSESSMENT    () N/A      Any change in Tobacco use status () N  ()Y      () Pt attended counseling education session            () Pt has contacted physician TOBACCO USE  RE ASSESSMENT    () N/A      Any change in Tobacco use status () N  ()Y      () Pt attended counseling education session            () Pt has contacted physician TOBACCO USE  DISCHARGE            Current Tobacco Use Status:         () Pt attended TC counseling education session  Date:           () Pt contacted physician        NRT product/ medication  :     Tobacco Use Goal  Initial Assessment      (x) Complete Cessation or Maintain Cessation of tobacco products   Tobacco Use Goal Intervention and Education Plan    (x Encourage pt to fight the urge, call quit line, use techniques learned from friends/ class    () Attend Tobacco Cessation class if needed   Tobacco Use  Reassessment          () Pt remains tobacco free            () Pt has had TC counseling session        () Pt still smoking Tobacco Use  Reassessment          () Pt remains tobacco free            () Pt has had TC counseling session        () Pt still smoking Tobacco Use  Reassessment          () Pt remains tobacco free            () Pt has had TC counseling session        () Pt still smoking Tobacco Use  Discharge          () Pt remains tobacco free            () Pt had TC counseling  Date:  See above        () Pt still smoking - gave resources for quitting             MEDICATIONS  (Oral & Inhaled)  INITIAL ASSESSMENT    Pt reports taking his meds properly 100% of the time      (x) Pt is on inhaled medications   MEDICATIONS  PLAN        -Review Rxs purpose, schedule, side effects and importance of compliance during Medication class. Also address Cpap, Vents.        MEDICATIONS  RE ASSESSMENT      Pt reports taking their meds properly  % of the time        () Pt has had class  () Pt has not had class MEDICATIONS  RE ASSESSMENT      Pt reports taking their meds properly  % of the time        () Pt has had class  () Pt has not had class MEDICATIONS  RE ASSESSMENT      Pt reports taking their meds properly  % of the time        () Pt has had class  () Pt has not had class MEDICATIONS  DISCHARGE        Pt reports taking their meds properly  % of the time        ()Pt had med class  Date:  () Pt has not had class       Pt has:  (x) Metered Dose Inhaler  (x) Dry Powder Inhaler  (x) Nebulizer   -Review correct use, timing, technique and cleaning of equipment during Medication class () Pt has had class  () Pt has not had class () Pt has had class  () Pt has not had class () Pt has had class  () Pt has not had class () Pt had class  Date: See above  () Pt has not had class   Medication Goals  Initial Assessment        (x) Take meds properly 100% of the time    (x) Learn about / Review Rxs, and devices Medication Goals  Intervention & Education  Plan      -Follow Rx instructions and ask if questions    -Attend Medication Education class   Medication Goals  Re assessment          () Readdressed questions on any medications    () Pt has had class  () Pt has not had class Medication Goals  Re assessment          () Readdressed questions on any medications    () Pt has had class  () Pt has not had class Medication Goals  Re assessment          () Readdressed questions on any medications    () Pt has had class  () Pt has not had class Medication Goals  Discharge          % proper med usage see above      () Pt had class  Date: See above  () Pt has not had class             ADL  INITIAL ASSESSMENT      (x) Impaired ADL ability  () Need for Assist Devices  (x) Generalized weakness, low functional capacity  (x) Stairs in house               ADL  PLAN        -Initiate NJ, RT, and chair squats  -Breathing retraining, PLB, and pacing      -Encourage home activity               ADL  RE ASSESSMENT      () Pt is progressing  () Pt is not progressing        () Pt has initiated home activity  () Pt has not yet initiated  home activity-      () ADLs getting easier  () ADLs not getting easier   ADL  RE ASSESSMENT      () Pt is progressing  () Pt is not progressing        () Pt has initiated home activity  () Pt has not yet initiated  home activity-      () ADLs getting easier  () ADLs not getting easier ADL  RE ASSESSMENT      () Pt is progressing  () Pt is not progressing        () Pt has initiated home activity  () Pt has not yet initiated  home activity-      () ADLs getting easier  () ADLs not getting easier ADL  DISCHARGE        () Pt showed strength and endurance gains  () Pt did not progress      () Pt doing recommended home activity  () Pt not doing home activitiy         ADL Goals   Initial Assessment      (x) Decrease SOB with ADLs              (x) Decreased SOB overall      ADL Goals Intervention/ Education Plan      -Initiate NJ, RT and chair squats and increase pts strength and endurance        -Pacing, Breathing retraining       ADL Goals Reassessment        () ADLs getting easier  () ADLs not getting easier          () Pt states activities are getting easier/ able to go longer/ not get as SOB   ADL Goals Reassessment        () ADLs getting easier  () ADLs not getting easier          () Pt states activities are getting easier/ able to go longer/ not get as SOB ADL Goals Reassessment        () ADLs getting easier  () ADLs not getting easier          () Pt states activities are getting easier/ able to go longer/ not get as SOB   ADL Goals   Discharge        Goal achieved?  () Yes  () No            Goal achieved?  () Yes  () No          Outcomes:  See Health and Functioning from the CAT tool and Strength and Endurance from 6 MWD above             EXACERBAT'N PREVENTION & MANAGEMENT  INITIAL ASSESSMENT      Pt reports;  () 0 respiratory infections  () 1   (x) >2  () Hospitalized in last 12 months   EXACERBAT'N PREVENTION & MANAGEMENT  PLAN        Address via Disease Self Management and Exacerbation prevention class:    -Hydration for mucus    -Hand Hygiene          -Evaluation of Sputum    -When to call MD  -S/Sx to report  -Decrease exposure to irritants/ exacerbators    -Cleaning of respiratory equipment   EXACERBAT'N PREVENTION & MANAGEMENT  RE ASSESSMENT      () Pt has had class  () Pt has not had class        () Improved hydration    () Correct hand washing techs and alcohol gel usage    () Sputum assessment    () Ability to recognize exacerbation and when to call MD        () Cleaning of respiratory equipment EXACERBAT'N PREVENTION & MANAGEMENT  RE ASSESSMENT      () Pt has had class  () Pt has not had class        () Improved hydration    () Correct hand washing techs and alcohol gel usage    () Sputum assessment    () Ability to recognize exacerbation and when to call MD        () Cleaning of respiratory equipment EXACERBAT'N PREVENTION & MANAGEMENT  RE ASSESSMENT      () Pt has had class  () Pt has not had class        () Improved hydration    () Correct hand washing techs and alcohol gel usage    () Sputum assessment    () Ability to recognize exacerbation and when to call MD        () Cleaning of respiratory equipment EXACERBAT'N PREVENTION & MANAGEMENT  DISCHARGE        () Pt has had class  Date:  () Pt did not have class              () Addressed questions and pt feels comfortable with hydration, hand washing, sputum assessment, exacerbation knowledge, and cleaning of equipment   Exacerbation Prevention & Management Goals  Initial Assessment      (x) Learn ways to stay healthy and not get admitted          (x) Increase knowledge of Lungs, Breathing, Exercise, Nutrition, Mood and controlling anxiety, and stopping the Dyspnea Cycle by attending classes   Exacerbation Prevention & Management Goals Intervention/ Education  Plan    -Attend class and demonstrate disease self management strategies      -Attend all appropriate classes                   Exacerbation Prevention & Management Goals  Reassessment        () Pt has had class  () Pt has not had class        () Pt has had all classes  () Pt has had some classes  () Pt has had little/ not staying for education Exacerbation Prevention & Management Goals  Reassessment        () Pt has had class  () Pt has not had class        () Pt has had all classes  () Pt has had some classes  () Pt has had little/ not staying for education Exacerbation Prevention & Management Goals  Reassessment        () Pt has had class  () Pt has not had class        () Pt has had all classes  () Pt has had most classes  () Pt has had some of the classes  () Pt has had little/ not staying for education Exacerbation Prevention & Management Goals  Discharge        () Pt had class  Date:  See above  () Pt did not have class        () Pt attended all relevant classes and all questions were answered                   PHYSICIAN INTERACTION    MD Initial Assessment Review    (x) Initial  Assessment Reviewed  (x) Treatment Plan and Goals support patient needs/ abilities                  Cosigned and dated below:   Pio Green MD 30 day and Plan Review:      (x) I have seen the patient in rehab during this 30 day reassessment  (x) I agree with the plan  (x) Continue with progression and instruction  () Continue, but with the following changes:      Cosigned and dated below: Mega Bahena MD 30 day Reassessment Review:    (x) I have seen the patient in rehab during this 30 day reassessment  (x) Continue with the current program  () Continue, but with the following changes:  () Discharge patient      Cosigned and dated below: Mega Bahena MD 30 day Reassessment Review:    (x) I have seen the patient in rehab during this 30 day reassessment  (x) Continue with the current program  () Continue, but with the following changes:  () Discharge patient      Cosigned and dated below: Mega Bahena MD 30 day Reassessment Review:    (x) I have seen the patient in rehab during this 30 day reassessment  (x) Continue with the current program  () Continue, but with the following changes:  () Discharge patient      Cosigned and dated below: Mega Bahena MD 30 day   Discharge Review:    (x) Discharge patient                            Cosigned and dated below:                   Cosigned by: Adam Sheets MD at 1/30/2023 11:24 AM     Revision History  Date/Time User Provider Type Action   1/30/2023 11:24 AM Adam Sheets MD Physician Cosign   1/24/2023 12:42 PM Byron Abernathy RCP Respiratory Therapist Sign

## 2023-02-23 ENCOUNTER — HOSPITAL ENCOUNTER (OUTPATIENT)
Dept: CARDIAC REHAB | Age: 78
Setting detail: THERAPIES SERIES
Discharge: HOME | End: 2023-02-23
Payer: MEDICARE

## 2023-02-23 PROCEDURE — 94626 PHY/QHP OP PULM RHB W/MNTR: CPT

## 2023-02-25 LAB
A/G RATIO: 1.2 (ref 1.5–2.5)
ABSOLUTE BASO #: 300 /CMM (ref 0–200)
ABSOLUTE EOS #: 600 /CMM (ref 0–500)
ABSOLUTE LYMPH #: 3600 /CMM (ref 1000–4800)
ABSOLUTE MONO #: 1400 /CMM (ref 0–800)
ABSOLUTE NEUT #: 6600 /CMM (ref 1800–7700)
ALBUMIN SERPL-MCNC: 3.4 G/DL (ref 3.5–5)
ALP BLD-CCNC: 80 IU/L (ref 41–137)
ALT SERPL-CCNC: 13 IU/L (ref 10–40)
ANION GAP SERPL CALCULATED.3IONS-SCNC: 2 MMOL/L (ref 4–12)
AST SERPL-CCNC: 15 IU/L (ref 15–41)
BASOPHILS RELATIVE PERCENT: 2.4 % (ref 0–2)
BILIRUB SERPL-MCNC: 0.7 MG/DL (ref 0.2–1)
BUN BLDV-MCNC: 17 MG/DL (ref 7–20)
CALCIUM SERPL-MCNC: 10.2 MG/DL (ref 8.8–10.5)
CHLORIDE BLD-SCNC: 101 MEQ/L (ref 101–111)
CHOLESTEROL/HDL RELATIVE RISK: 2.7 (ref 4–5)
CHOLESTEROL: 152 MG/DL
CO2: 34 MEQ/L (ref 21–32)
CREAT SERPL-MCNC: 0.88 MG/DL (ref 0.6–1.3)
CREATININE CLEARANCE: >60
DIRECT-LDL / HDL RISK: 1.4
EOSINOPHILS RELATIVE PERCENT: 4.8 % (ref 0–6)
ESTIMATED AVERAGE GLUCOSE: 180 MG/DL
GLUCOSE: 102 MG/DL (ref 70–110)
HBA1C MFR BLD: 7.9 % (ref 4.4–6.4)
HCT VFR BLD CALC: 35.2 % (ref 40–49)
HDLC SERPL-MCNC: 55 MG/DL
HEMOGLOBIN: 11.6 GM/DL (ref 13.5–16.5)
LDL CHOLESTEROL DIRECT: 79 MG/DL
LYMPHOCYTES RELATIVE PERCENT: 28.5 % (ref 15–45)
MCH RBC QN AUTO: 31.1 PG (ref 27.5–33)
MCHC RBC AUTO-ENTMCNC: 32.9 GM/DL (ref 33–36)
MCV RBC AUTO: 94.5 CU MIC (ref 80–97)
MONOCYTES RELATIVE PERCENT: 11.5 % (ref 2–10)
NEUTROPHILS RELATIVE PERCENT: 52.8 % (ref 40–70)
NUCLEATED RBCS: 0 /100 WBC
PDW BLD-RTO: 13 % (ref 12–16)
PLATELET # BLD: 281 TH/CMM (ref 150–400)
POTASSIUM SERPL-SCNC: 5 MEQ/L (ref 3.6–5)
RBC # BLD: 3.73 MIL/CMM (ref 4.5–6)
SODIUM BLD-SCNC: 137 MEQ/L (ref 135–145)
TOTAL PROTEIN: 6.3 G/DL (ref 6.2–8)
TRIGL SERPL-MCNC: 102 MG/DL
VLDLC SERPL CALC-MCNC: 20 MG/DL
WBC # BLD: 12.5 TH/CMM (ref 4.4–10.5)

## 2023-02-28 ENCOUNTER — HOSPITAL ENCOUNTER (OUTPATIENT)
Dept: CARDIAC REHAB | Age: 78
Setting detail: THERAPIES SERIES
Discharge: HOME | End: 2023-02-28
Payer: MEDICARE

## 2023-02-28 PROCEDURE — 94626 PHY/QHP OP PULM RHB W/MNTR: CPT

## 2023-03-02 ENCOUNTER — HOSPITAL ENCOUNTER (OUTPATIENT)
Dept: CARDIAC REHAB | Age: 78
Setting detail: THERAPIES SERIES
Discharge: HOME | End: 2023-03-02
Payer: MEDICARE

## 2023-03-02 PROCEDURE — 94626 PHY/QHP OP PULM RHB W/MNTR: CPT

## 2023-03-07 ENCOUNTER — HOSPITAL ENCOUNTER (OUTPATIENT)
Dept: CARDIAC REHAB | Age: 78
Setting detail: THERAPIES SERIES
Discharge: HOME | End: 2023-03-07
Payer: MEDICARE

## 2023-03-07 PROCEDURE — 94626 PHY/QHP OP PULM RHB W/MNTR: CPT

## 2023-03-09 ENCOUNTER — HOSPITAL ENCOUNTER (OUTPATIENT)
Dept: CARDIAC REHAB | Age: 78
Setting detail: THERAPIES SERIES
Discharge: HOME | End: 2023-03-09
Payer: MEDICARE

## 2023-03-09 PROCEDURE — 94626 PHY/QHP OP PULM RHB W/MNTR: CPT

## 2023-03-14 ENCOUNTER — HOSPITAL ENCOUNTER (OUTPATIENT)
Dept: CARDIAC REHAB | Age: 78
Setting detail: THERAPIES SERIES
Discharge: HOME | End: 2023-03-14
Payer: MEDICARE

## 2023-03-14 PROCEDURE — 94626 PHY/QHP OP PULM RHB W/MNTR: CPT

## 2023-03-16 ENCOUNTER — HOSPITAL ENCOUNTER (OUTPATIENT)
Dept: CARDIAC REHAB | Age: 78
Setting detail: THERAPIES SERIES
Discharge: HOME | End: 2023-03-16
Payer: MEDICARE

## 2023-03-16 PROCEDURE — 94626 PHY/QHP OP PULM RHB W/MNTR: CPT

## 2023-03-21 ENCOUNTER — HOSPITAL ENCOUNTER (OUTPATIENT)
Dept: CARDIAC REHAB | Age: 78
Setting detail: THERAPIES SERIES
Discharge: HOME | End: 2023-03-21
Payer: MEDICARE

## 2023-03-21 PROCEDURE — 94626 PHY/QHP OP PULM RHB W/MNTR: CPT

## 2023-03-21 NOTE — PLAN OF CARE
Review    (x) Initial  Assessment Reviewed  (x) Treatment Plan and Goals support patient needs/ abilities                  Cosigned and dated below:   PHYSICIAN MONICA MARRERO 30 day and Plan Review:      (x) I have seen the patient in rehab during this 30 day reassessment  (x) I agree with the plan  (x) Continue with progression and instruction  () Continue, but with the following changes:      Cosigned and dated below: Keila Hicks MD 30 day Reassessment Review:    (x) I have seen the patient in rehab during this 30 day reassessment  (x) Continue with the current program  () Continue, but with the following changes:  () Discharge patient      Cosigned and dated below: Keila Hicks MD 30 day Reassessment Review:    (x) I have seen the patient in rehab during this 30 day reassessment  (x) Continue with the current program  () Continue, but with the following changes:  () Discharge patient      Cosigned and dated below: Keila Hicks MD 30 day Reassessment Review:    (x) I have seen the patient in rehab during this 30 day reassessment  (x) Continue with the current program  () Continue, but with the following changes:  () Discharge patient      Cosigned and dated below: Keila Hicks MD 30 day   Discharge Review:    (x) Discharge patient                            Cosigned and dated below:                   Cosigned by: Deloris Hamilton MD at 1/30/2023 11:24 AM     Revision History  Date/Time User Provider Type Action   1/30/2023 11:24 AM Deloris Hamilton MD Physician Cosign   1/24/2023 12:42 PM Lona Gregory RCP Respiratory Therapist Sign     Cosigned by: Deloris Hamilton MD at 2/22/2023  1:46 PM     Revision History  Date/Time User Provider Type Action   2/22/2023  1:46 PM Deloris Hamilton MD Physician Cosign   2/21/2023  3:33 PM VADIM Vargas Exercise Physiologist Sign   2/21/2023  3:29 PM VADIM Vargas Exercise Physiologist

## 2023-03-23 ENCOUNTER — HOSPITAL ENCOUNTER (OUTPATIENT)
Dept: CARDIAC REHAB | Age: 78
Setting detail: THERAPIES SERIES
Discharge: HOME | End: 2023-03-23
Payer: MEDICARE

## 2023-03-23 PROCEDURE — 94626 PHY/QHP OP PULM RHB W/MNTR: CPT

## 2023-03-28 ENCOUNTER — HOSPITAL ENCOUNTER (OUTPATIENT)
Dept: CARDIAC REHAB | Age: 78
Setting detail: THERAPIES SERIES
Discharge: HOME | End: 2023-03-28
Payer: MEDICARE

## 2023-03-28 VITALS — WEIGHT: 220 LBS | BODY MASS INDEX: 31.5 KG/M2 | HEIGHT: 70 IN

## 2023-03-28 PROCEDURE — 94626 PHY/QHP OP PULM RHB W/MNTR: CPT

## 2023-03-28 NOTE — PLAN OF CARE
the education at this time. (x) Pt is >87%  () Pt has been found to desaturate - physician notified Oxygen Intervention/ Education  Discharge      (x) Pt has been in rehab before and not wanting any of the education at this time. (x) Pt did well  () Pt needed to be put on oxygen         TOBACCO USE  INITIAL ASSESSMENT    (x) Pt currently not smoking    () Pt currently smoking        () Pt needs Tobacco Cessation counseling          Smoked 2 ppd for 24 years  Pt quit in 1990.    TOBACCO USE  PLAN      (x) N/A      Does pt want to quit:   Does pt have a quit date:    () Tobacco Cessation counseling Education if applicable        () Encouraged to contact physician for tools/ nicotine replacement etc.   TOBACCO USE  RE ASSESSMENT    (x) N/A      Any change in Tobacco use status () N  ()Y      () Pt attended counseling education session            () Pt has contacted physician TOBACCO USE  DISCHARGE      (X) N/A, pt a non smoker      Current Tobacco Use Status:         () Pt attended TC counseling education session  Date:           () Pt contacted physician        NRT product/ medication  :     Tobacco Use Goal  Initial Assessment      (x) Complete Cessation or Maintain Cessation of tobacco products   Tobacco Use Goal Intervention and Education Plan    (x Encourage pt to fight the urge, call quit line, use techniques learned from friends/ class    () Attend Tobacco Cessation class if needed   Tobacco Use  Reassessment          (x) Pt remains tobacco free            () Pt has had TC counseling session        () Pt still smoking Tobacco Use  Discharge          (x) Pt remains tobacco free            () Pt had TC counseling  Date:  See above        () Pt still smoking - gave resources for quitting           MEDICATIONS  (Oral & Inhaled)  INITIAL ASSESSMENT    Pt reports taking his meds properly 100% of the time      (x) Pt is on inhaled medications   MEDICATIONS  PLAN        -Review Rxs purpose, schedule, side

## 2023-03-30 ENCOUNTER — HOSPITAL ENCOUNTER (OUTPATIENT)
Dept: CARDIAC REHAB | Age: 78
Setting detail: THERAPIES SERIES
End: 2023-03-30
Payer: MEDICARE

## 2023-04-17 RX ORDER — CELECOXIB 200 MG/1
CAPSULE ORAL
Qty: 60 CAPSULE | Refills: 5 | Status: SHIPPED | OUTPATIENT
Start: 2023-04-17

## 2023-04-24 ENCOUNTER — OFFICE VISIT (OUTPATIENT)
Dept: PULMONOLOGY | Age: 78
End: 2023-04-24
Payer: MEDICARE

## 2023-04-24 VITALS
HEART RATE: 88 BPM | SYSTOLIC BLOOD PRESSURE: 138 MMHG | TEMPERATURE: 97.1 F | DIASTOLIC BLOOD PRESSURE: 60 MMHG | OXYGEN SATURATION: 98 % | HEIGHT: 70 IN | WEIGHT: 220 LBS | BODY MASS INDEX: 31.5 KG/M2

## 2023-04-24 DIAGNOSIS — J96.12 CHRONIC RESPIRATORY FAILURE WITH HYPOXIA AND HYPERCAPNIA (HCC): Primary | ICD-10-CM

## 2023-04-24 DIAGNOSIS — E66.9 OBESITY (BMI 30.0-34.9): ICD-10-CM

## 2023-04-24 DIAGNOSIS — J43.2 CENTRILOBULAR EMPHYSEMA (HCC): ICD-10-CM

## 2023-04-24 DIAGNOSIS — J96.11 CHRONIC RESPIRATORY FAILURE WITH HYPOXIA AND HYPERCAPNIA (HCC): Primary | ICD-10-CM

## 2023-04-24 DIAGNOSIS — J98.4 MIXED RESTRICTIVE AND OBSTRUCTIVE LUNG DISEASE (HCC): ICD-10-CM

## 2023-04-24 DIAGNOSIS — J43.9 MIXED RESTRICTIVE AND OBSTRUCTIVE LUNG DISEASE (HCC): ICD-10-CM

## 2023-04-24 PROBLEM — J44.9 COPD (CHRONIC OBSTRUCTIVE PULMONARY DISEASE) (HCC): Status: RESOLVED | Noted: 2017-03-21 | Resolved: 2023-04-24

## 2023-04-24 PROCEDURE — G8417 CALC BMI ABV UP PARAM F/U: HCPCS | Performed by: NURSE PRACTITIONER

## 2023-04-24 PROCEDURE — 3023F SPIROM DOC REV: CPT | Performed by: NURSE PRACTITIONER

## 2023-04-24 PROCEDURE — 1124F ACP DISCUSS-NO DSCNMKR DOCD: CPT | Performed by: NURSE PRACTITIONER

## 2023-04-24 PROCEDURE — 99213 OFFICE O/P EST LOW 20 MIN: CPT | Performed by: NURSE PRACTITIONER

## 2023-04-24 PROCEDURE — G8427 DOCREV CUR MEDS BY ELIG CLIN: HCPCS | Performed by: NURSE PRACTITIONER

## 2023-04-24 PROCEDURE — 1036F TOBACCO NON-USER: CPT | Performed by: NURSE PRACTITIONER

## 2023-04-24 RX ORDER — LISINOPRIL 10 MG/1
10 TABLET ORAL 2 TIMES DAILY
COMMUNITY
Start: 2023-03-15

## 2023-04-24 ASSESSMENT — ENCOUNTER SYMPTOMS
WHEEZING: 0
ABDOMINAL PAIN: 0
SHORTNESS OF BREATH: 1
VOMITING: 0
COUGH: 0
EYES NEGATIVE: 1
DIARRHEA: 0
NAUSEA: 0

## 2023-04-24 NOTE — PROGRESS NOTES
1 CAPSULE BY MOUTH TWICE DAILY 60 capsule 5    SPIRIVA RESPIMAT 2.5 MCG/ACT AERS inhaler USE 2 INHALATIONS DAILY 12 g 3    ALLERGY RELIEF 180 MG tablet TAKE 1 TABLET BY MOUTH DAILY AS NEEDED      doxazosin (CARDURA) 4 MG tablet TAKE 1 TABLET BY MOUTH EVERY DAY      ADVAIR DISKUS 250-50 MCG/DOSE AEPB USE 1 INHALATION TWICE A  each 3    metFORMIN (GLUCOPHAGE) 500 MG tablet Take 1 tablet by mouth 2 times daily (with meals) HOLD 48 HRS PRIOR TO SURGERY 60 tablet 5    albuterol (PROVENTIL) (2.5 MG/3ML) 0.083% nebulizer solution Take 3 mLs by nebulization every 6 hours as needed for Wheezing or Shortness of Breath 120 each 3    BD INSULIN SYRINGE U/F 31G X 5/16\" 0.5 ML MISC USE AS DIRECTED UNDER THE SKIN TWICE DAILY      atorvastatin (LIPITOR) 10 MG tablet Take 1 tablet by mouth nightly       albuterol sulfate (PROAIR RESPICLICK) 451 (90 Base) MCG/ACT aerosol powder inhalation Inhale 2 puffs into the lungs every 6 hours as needed for Wheezing or Shortness of Breath 3 Inhaler 3    OXYGEN Inhale into the lungs 1 L at rest and 3 L with activity      TRAVATAN Z 0.004 % SOLN ophthalmic solution Place 1 drop into both eyes nightly. Insulin Lispro Prot & Lispro (HUMALOG MIX 75/25 SC) Inject 30 Units into the skin 2 times daily       quinapril (ACCUPRIL) 20 MG tablet Take 20 mg by mouth 2 times daily. esomeprazole (NEXIUM) 40 MG delayed release capsule Take 40 mg by mouth 2 times daily. No current facility-administered medications for this visit. Author Kraig VARNER   Review of Systems   Constitutional:  Negative for activity change, appetite change, chills, fatigue, fever and unexpected weight change. HENT: Negative. Eyes: Negative. Respiratory:  Positive for shortness of breath. Negative for cough and wheezing. Cardiovascular:  Negative for chest pain, palpitations and leg swelling. Gastrointestinal:  Negative for abdominal pain, diarrhea, nausea and vomiting. Genitourinary: Negative.

## 2023-10-03 RX ORDER — CELECOXIB 200 MG/1
CAPSULE ORAL
Qty: 60 CAPSULE | Refills: 0 | Status: SHIPPED | OUTPATIENT
Start: 2023-10-03

## 2023-10-29 ENCOUNTER — PATIENT MESSAGE (OUTPATIENT)
Dept: FAMILY MEDICINE CLINIC | Age: 78
End: 2023-10-29

## 2023-10-30 NOTE — TELEPHONE ENCOUNTER
From: Winston Verma  To: Dr. Wilber Heard: 10/29/2023 4:20 PM EDT  Subject: Vaccinations    I have had the following shots and would like them noted in your records. They were all done at [facility].     10/2/2023 Covid   Flu   RSV  10/28/2023 Shingles    Prevnar 20 Pneumonia  Thank you,  Rossy Gamez

## 2024-01-17 LAB
A/G RATIO: 1.3 (ref 1.5–2.5)
ABSOLUTE BASO #: 200 /CMM (ref 0–200)
ABSOLUTE EOS #: 600 /CMM (ref 0–500)
ABSOLUTE LYMPH #: 3700 /CMM (ref 1000–4800)
ABSOLUTE MONO #: 1500 /CMM (ref 0–800)
ABSOLUTE NEUT #: 7900 /CMM (ref 1800–7700)
ALBUMIN SERPL-MCNC: 3.7 G/DL (ref 3.5–5)
ALP BLD-CCNC: 82 IU/L (ref 41–137)
ALT SERPL-CCNC: 13 IU/L (ref 10–40)
ANION GAP SERPL CALCULATED.3IONS-SCNC: 4 MMOL/L (ref 4–12)
AST SERPL-CCNC: 16 IU/L (ref 15–41)
BASOPHILS RELATIVE PERCENT: 1.1 % (ref 0–2)
BILIRUB SERPL-MCNC: 0.7 MG/DL (ref 0.2–1)
BUN BLDV-MCNC: 19 MG/DL (ref 7–20)
CALCIUM SERPL-MCNC: 11.2 MG/DL (ref 8.8–10.5)
CHLORIDE BLD-SCNC: 97 MEQ/L (ref 101–111)
CHOLESTEROL/HDL RELATIVE RISK: 2.7 (ref 4–5)
CHOLESTEROL: 151 MG/DL
CO2: 36 MEQ/L (ref 21–32)
CREAT SERPL-MCNC: 0.95 MG/DL (ref 0.6–1.3)
CREATININE CLEARANCE: >60
DIRECT-LDL / HDL RISK: 1.3
EOSINOPHILS RELATIVE PERCENT: 4.5 % (ref 0–6)
ESTIMATED AVERAGE GLUCOSE: 169 MG/DL
GLUCOSE: 112 MG/DL (ref 70–110)
HBA1C MFR BLD: 7.5 % (ref 4.4–6.4)
HCT VFR BLD CALC: 35.9 % (ref 40–49)
HDLC SERPL-MCNC: 55 MG/DL
HEMOGLOBIN: 11.9 GM/DL (ref 13.5–16.5)
LDL CHOLESTEROL DIRECT: 75 MG/DL
LYMPHOCYTES RELATIVE PERCENT: 26.9 % (ref 15–45)
MCH RBC QN AUTO: 31.7 PG (ref 27.5–33)
MCHC RBC AUTO-ENTMCNC: 33.2 GM/DL (ref 33–36)
MCV RBC AUTO: 95.4 CU MIC (ref 80–97)
MONOCYTES RELATIVE PERCENT: 10.9 % (ref 2–10)
NEUTROPHILS RELATIVE PERCENT: 56.6 % (ref 40–70)
NUCLEATED RBCS: 0.1 /100 WBC
PDW BLD-RTO: 12.6 % (ref 12–16)
PLATELET # BLD: 256 TH/CMM (ref 150–400)
POTASSIUM SERPL-SCNC: 4.4 MEQ/L (ref 3.6–5)
RBC # BLD: 3.76 MIL/CMM (ref 4.5–6)
SODIUM BLD-SCNC: 137 MEQ/L (ref 135–145)
TOTAL PROTEIN: 6.5 G/DL (ref 6.2–8)
TRIGL SERPL-MCNC: 145 MG/DL
VLDLC SERPL CALC-MCNC: 29 MG/DL
WBC # BLD: 13.9 TH/CMM (ref 4.4–10.5)

## 2024-01-29 ENCOUNTER — OFFICE VISIT (OUTPATIENT)
Dept: FAMILY MEDICINE CLINIC | Age: 79
End: 2024-01-29

## 2024-01-29 VITALS
HEART RATE: 69 BPM | BODY MASS INDEX: 31.85 KG/M2 | TEMPERATURE: 98.5 F | SYSTOLIC BLOOD PRESSURE: 132 MMHG | WEIGHT: 222 LBS | DIASTOLIC BLOOD PRESSURE: 74 MMHG | OXYGEN SATURATION: 90 %

## 2024-01-29 DIAGNOSIS — R05.1 ACUTE COUGH: ICD-10-CM

## 2024-01-29 DIAGNOSIS — J44.1 COPD EXACERBATION (HCC): Primary | ICD-10-CM

## 2024-01-29 DIAGNOSIS — J06.9 UPPER RESPIRATORY TRACT INFECTION, UNSPECIFIED TYPE: ICD-10-CM

## 2024-01-29 LAB
INFLUENZA VIRUS A RNA: NEGATIVE
INFLUENZA VIRUS B RNA: NEGATIVE
Lab: NORMAL
QC PASS/FAIL: NORMAL
SARS-COV-2 RDRP RESP QL NAA+PROBE: NEGATIVE

## 2024-01-29 RX ORDER — PREDNISONE 20 MG/1
40 TABLET ORAL DAILY
Qty: 14 TABLET | Refills: 0 | Status: SHIPPED | OUTPATIENT
Start: 2024-01-29 | End: 2024-02-05

## 2024-01-29 RX ORDER — AMOXICILLIN AND CLAVULANATE POTASSIUM 875; 125 MG/1; MG/1
1 TABLET, FILM COATED ORAL 2 TIMES DAILY
Qty: 20 TABLET | Refills: 0 | Status: SHIPPED | OUTPATIENT
Start: 2024-01-29 | End: 2024-02-08

## 2024-01-29 SDOH — ECONOMIC STABILITY: INCOME INSECURITY: HOW HARD IS IT FOR YOU TO PAY FOR THE VERY BASICS LIKE FOOD, HOUSING, MEDICAL CARE, AND HEATING?: NOT HARD AT ALL

## 2024-01-29 SDOH — ECONOMIC STABILITY: FOOD INSECURITY: WITHIN THE PAST 12 MONTHS, YOU WORRIED THAT YOUR FOOD WOULD RUN OUT BEFORE YOU GOT MONEY TO BUY MORE.: NEVER TRUE

## 2024-01-29 SDOH — ECONOMIC STABILITY: HOUSING INSECURITY
IN THE LAST 12 MONTHS, WAS THERE A TIME WHEN YOU DID NOT HAVE A STEADY PLACE TO SLEEP OR SLEPT IN A SHELTER (INCLUDING NOW)?: NO

## 2024-01-29 SDOH — ECONOMIC STABILITY: FOOD INSECURITY: WITHIN THE PAST 12 MONTHS, THE FOOD YOU BOUGHT JUST DIDN'T LAST AND YOU DIDN'T HAVE MONEY TO GET MORE.: NEVER TRUE

## 2024-01-29 ASSESSMENT — PATIENT HEALTH QUESTIONNAIRE - PHQ9
SUM OF ALL RESPONSES TO PHQ QUESTIONS 1-9: 0
1. LITTLE INTEREST OR PLEASURE IN DOING THINGS: 0
SUM OF ALL RESPONSES TO PHQ QUESTIONS 1-9: 0
SUM OF ALL RESPONSES TO PHQ QUESTIONS 1-9: 0
SUM OF ALL RESPONSES TO PHQ9 QUESTIONS 1 & 2: 0
2. FEELING DOWN, DEPRESSED OR HOPELESS: 0
SUM OF ALL RESPONSES TO PHQ QUESTIONS 1-9: 0

## 2024-02-02 RX ORDER — DOXYCYCLINE HYCLATE 100 MG
100 TABLET ORAL 2 TIMES DAILY
Qty: 20 TABLET | Refills: 0 | Status: SHIPPED | OUTPATIENT
Start: 2024-02-02 | End: 2024-02-12

## 2024-02-12 LAB
CALCIUM SERPL-MCNC: 9.7 MG/DL (ref 8.8–10.5)
IONIZED CA: 1.32 MMOL/L (ref 1.15–1.29)
PARATHYROID HORMONE INTACT: 62.7 U/ML (ref 12–88)
PHOSPHORUS: 2.3 MG/DL (ref 2.4–4.7)

## 2024-03-06 DIAGNOSIS — Z20.828 EXPOSURE TO INFLUENZA: Primary | ICD-10-CM

## 2024-03-06 RX ORDER — OSELTAMIVIR PHOSPHATE 75 MG/1
75 CAPSULE ORAL 2 TIMES DAILY
Qty: 10 CAPSULE | Refills: 0 | Status: SHIPPED | OUTPATIENT
Start: 2024-03-06 | End: 2024-03-11

## 2024-03-11 ENCOUNTER — OFFICE VISIT (OUTPATIENT)
Dept: FAMILY MEDICINE CLINIC | Age: 79
End: 2024-03-11

## 2024-03-11 VITALS
HEART RATE: 73 BPM | RESPIRATION RATE: 16 BRPM | SYSTOLIC BLOOD PRESSURE: 130 MMHG | BODY MASS INDEX: 31.57 KG/M2 | TEMPERATURE: 98 F | WEIGHT: 220 LBS | OXYGEN SATURATION: 93 % | DIASTOLIC BLOOD PRESSURE: 60 MMHG

## 2024-03-11 DIAGNOSIS — M47.816 LUMBAR SPONDYLOSIS: Primary | ICD-10-CM

## 2024-03-11 DIAGNOSIS — M48.061 LUMBAR FORAMINAL STENOSIS: ICD-10-CM

## 2024-03-11 RX ORDER — METHYLPREDNISOLONE ACETATE 80 MG/ML
160 INJECTION, SUSPENSION INTRA-ARTICULAR; INTRALESIONAL; INTRAMUSCULAR; SOFT TISSUE ONCE
Status: COMPLETED | OUTPATIENT
Start: 2024-03-11 | End: 2024-03-11

## 2024-03-11 RX ADMIN — METHYLPREDNISOLONE ACETATE 160 MG: 80 INJECTION, SUSPENSION INTRA-ARTICULAR; INTRALESIONAL; INTRAMUSCULAR; SOFT TISSUE at 10:47

## 2024-03-16 ASSESSMENT — ENCOUNTER SYMPTOMS
SORE THROAT: 0
SINUS PRESSURE: 0
EYE PAIN: 0
ABDOMINAL DISTENTION: 0
ABDOMINAL PAIN: 0
CONSTIPATION: 0
BACK PAIN: 1
NAUSEA: 0
RHINORRHEA: 0
SHORTNESS OF BREATH: 0
COUGH: 0
DIARRHEA: 0

## 2024-04-23 ENCOUNTER — OFFICE VISIT (OUTPATIENT)
Dept: PULMONOLOGY | Age: 79
End: 2024-04-23
Payer: MEDICARE

## 2024-04-23 VITALS
TEMPERATURE: 98.8 F | HEART RATE: 75 BPM | DIASTOLIC BLOOD PRESSURE: 60 MMHG | WEIGHT: 213.8 LBS | BODY MASS INDEX: 30.61 KG/M2 | SYSTOLIC BLOOD PRESSURE: 128 MMHG | OXYGEN SATURATION: 94 % | HEIGHT: 70 IN

## 2024-04-23 DIAGNOSIS — G47.34 NOCTURNAL OXYGEN DESATURATION: ICD-10-CM

## 2024-04-23 DIAGNOSIS — J43.2 CENTRILOBULAR EMPHYSEMA (HCC): Primary | ICD-10-CM

## 2024-04-23 DIAGNOSIS — J96.12 CHRONIC RESPIRATORY FAILURE WITH HYPOXIA AND HYPERCAPNIA (HCC): ICD-10-CM

## 2024-04-23 DIAGNOSIS — E66.9 OBESITY (BMI 30.0-34.9): ICD-10-CM

## 2024-04-23 DIAGNOSIS — J96.11 CHRONIC RESPIRATORY FAILURE WITH HYPOXIA AND HYPERCAPNIA (HCC): ICD-10-CM

## 2024-04-23 PROCEDURE — 1036F TOBACCO NON-USER: CPT | Performed by: NURSE PRACTITIONER

## 2024-04-23 PROCEDURE — G8417 CALC BMI ABV UP PARAM F/U: HCPCS | Performed by: NURSE PRACTITIONER

## 2024-04-23 PROCEDURE — G8427 DOCREV CUR MEDS BY ELIG CLIN: HCPCS | Performed by: NURSE PRACTITIONER

## 2024-04-23 PROCEDURE — 3023F SPIROM DOC REV: CPT | Performed by: NURSE PRACTITIONER

## 2024-04-23 PROCEDURE — 1124F ACP DISCUSS-NO DSCNMKR DOCD: CPT | Performed by: NURSE PRACTITIONER

## 2024-04-23 PROCEDURE — 99213 OFFICE O/P EST LOW 20 MIN: CPT | Performed by: NURSE PRACTITIONER

## 2024-04-23 RX ORDER — FLUTICASONE PROPIONATE AND SALMETEROL 250; 50 UG/1; UG/1
1 POWDER RESPIRATORY (INHALATION) EVERY 12 HOURS
Qty: 60 EACH | Refills: 3 | Status: SHIPPED | OUTPATIENT
Start: 2024-04-23

## 2024-04-23 RX ORDER — ALBUTEROL SULFATE 90 UG/1
2 AEROSOL, METERED RESPIRATORY (INHALATION) 4 TIMES DAILY PRN
Qty: 18 G | Refills: 5 | Status: SHIPPED | OUTPATIENT
Start: 2024-04-23

## 2024-04-23 RX ORDER — LIDOCAINE 50 MG/G
PATCH TOPICAL
COMMUNITY
Start: 2024-03-26

## 2024-04-23 ASSESSMENT — ENCOUNTER SYMPTOMS: SHORTNESS OF BREATH: 1

## 2024-04-23 NOTE — PROGRESS NOTES
ambulation.  Patient will use anywhere from 1 L to 3 L/min depending on the exertional activity and if he is using a continuous flow at home versus his portable oxygen concentrator    Obesity (BMI 30.0-34.9)- stable  Pt counseled on obesity, health risks associated with obesity and counseled on need for weight reduction.     Nocturnal oxygen desaturation-controlled  Continue 2 L/min nasal cannula oxygen with sleep    Will see Butch Reynoso in: 1 year  billing based on medical decision making   Electronically signed by SHONNA Kimbrough CNP on 4/23/2024 at 10:24 AM

## 2024-05-06 ENCOUNTER — TELEPHONE (OUTPATIENT)
Dept: PULMONOLOGY | Age: 79
End: 2024-05-06

## 2024-05-06 DIAGNOSIS — J43.2 CENTRILOBULAR EMPHYSEMA (HCC): ICD-10-CM

## 2024-05-06 RX ORDER — FLUTICASONE PROPIONATE AND SALMETEROL 250; 50 UG/1; UG/1
1 POWDER RESPIRATORY (INHALATION) EVERY 12 HOURS
Qty: 3 EACH | Refills: 3 | Status: SHIPPED | OUTPATIENT
Start: 2024-05-06

## 2024-05-23 ENCOUNTER — OFFICE VISIT (OUTPATIENT)
Dept: FAMILY MEDICINE CLINIC | Age: 79
End: 2024-05-23

## 2024-05-23 VITALS
SYSTOLIC BLOOD PRESSURE: 136 MMHG | HEART RATE: 70 BPM | BODY MASS INDEX: 31.14 KG/M2 | OXYGEN SATURATION: 94 % | DIASTOLIC BLOOD PRESSURE: 64 MMHG | WEIGHT: 217 LBS | RESPIRATION RATE: 16 BRPM | TEMPERATURE: 97.7 F

## 2024-05-23 DIAGNOSIS — M54.32 SCIATICA OF LEFT SIDE: Primary | ICD-10-CM

## 2024-05-23 DIAGNOSIS — M46.1 SACROILIAC INFLAMMATION (HCC): ICD-10-CM

## 2024-05-23 RX ORDER — METHYLPREDNISOLONE ACETATE 80 MG/ML
160 INJECTION, SUSPENSION INTRA-ARTICULAR; INTRALESIONAL; INTRAMUSCULAR; SOFT TISSUE ONCE
Status: COMPLETED | OUTPATIENT
Start: 2024-05-23 | End: 2024-05-23

## 2024-05-23 RX ADMIN — METHYLPREDNISOLONE ACETATE 160 MG: 80 INJECTION, SUSPENSION INTRA-ARTICULAR; INTRALESIONAL; INTRAMUSCULAR; SOFT TISSUE at 10:08

## 2024-05-23 NOTE — PROGRESS NOTES
Administrations This Visit       methylPREDNISolone acetate (DEPO-MEDROL) injection 160 mg       Admin Date  05/23/2024  10:08 Action  Given Dose  160 mg Route  IntraMUSCular Site  Dorsogluteal Left Administered By  Lilo Andre RN    Ordering Provider: Alfa Martinez MD    NDC: 2026-9229-55    Lot#: [id number]    : [employer] [place]    Patient Supplied?: No

## 2024-06-08 ENCOUNTER — PATIENT MESSAGE (OUTPATIENT)
Dept: FAMILY MEDICINE CLINIC | Age: 79
End: 2024-06-08

## 2024-06-08 DIAGNOSIS — M54.32 SCIATICA OF LEFT SIDE: Primary | ICD-10-CM

## 2024-06-08 DIAGNOSIS — M47.816 LUMBAR SPONDYLOSIS: ICD-10-CM

## 2024-06-08 DIAGNOSIS — M46.1 SACROILIAC INFLAMMATION (HCC): ICD-10-CM

## 2024-06-08 DIAGNOSIS — M48.061 LUMBAR FORAMINAL STENOSIS: ICD-10-CM

## 2024-06-10 NOTE — TELEPHONE ENCOUNTER
From: Butch Hatch  To: Dr. Alfa Martinez  Sent: 6/8/2024 6:15 PM EDT  Subject: Hip/Leg/Back Pain    I was in the office on 5/23/24 for hip/leg/back pain and you gave me a shot in the hip. It has improved a little but is still quite painful which makes it difficult to walk. Is there anything else you could recommend or would you give me a referral to Dr. Fisher. I saw him years ago but they said I need a referral since its been so long. I will look forward to hearing from you soon.  Thank you.  Butch Hatch

## 2024-06-10 NOTE — TELEPHONE ENCOUNTER
Dr. Del Toro's office will require x-rays before we can send the referral.  Get x-ray of the lumbar spine, diagnosis  lumbar back pain

## 2024-06-11 ENCOUNTER — HOSPITAL ENCOUNTER (OUTPATIENT)
Age: 79
Discharge: HOME | End: 2024-06-11
Payer: MEDICARE

## 2024-06-11 ENCOUNTER — HOSPITAL ENCOUNTER (OUTPATIENT)
Dept: GENERAL RADIOLOGY | Age: 79
Discharge: HOME | End: 2024-06-11
Payer: MEDICARE

## 2024-06-11 DIAGNOSIS — M54.32 SCIATICA OF LEFT SIDE: ICD-10-CM

## 2024-06-11 DIAGNOSIS — M47.816 LUMBAR SPONDYLOSIS: ICD-10-CM

## 2024-06-11 DIAGNOSIS — M46.1 SACROILIAC INFLAMMATION (HCC): ICD-10-CM

## 2024-06-11 DIAGNOSIS — M48.061 LUMBAR FORAMINAL STENOSIS: ICD-10-CM

## 2024-06-11 PROCEDURE — 72100 X-RAY EXAM L-S SPINE 2/3 VWS: CPT

## 2024-06-19 ENCOUNTER — OFFICE VISIT (OUTPATIENT)
Dept: PHYSICAL MEDICINE AND REHAB | Age: 79
End: 2024-06-19
Payer: MEDICARE

## 2024-06-19 VITALS
SYSTOLIC BLOOD PRESSURE: 118 MMHG | DIASTOLIC BLOOD PRESSURE: 68 MMHG | WEIGHT: 216.93 LBS | HEIGHT: 70 IN | BODY MASS INDEX: 31.06 KG/M2

## 2024-06-19 DIAGNOSIS — G89.29 CHRONIC MIDLINE LOW BACK PAIN WITH LEFT-SIDED SCIATICA: ICD-10-CM

## 2024-06-19 DIAGNOSIS — M47.816 LUMBAR SPONDYLOSIS: ICD-10-CM

## 2024-06-19 DIAGNOSIS — M48.062 SPINAL STENOSIS OF LUMBAR REGION WITH NEUROGENIC CLAUDICATION: ICD-10-CM

## 2024-06-19 DIAGNOSIS — M54.42 CHRONIC MIDLINE LOW BACK PAIN WITH LEFT-SIDED SCIATICA: ICD-10-CM

## 2024-06-19 DIAGNOSIS — M54.17 LUMBOSACRAL RADICULITIS: ICD-10-CM

## 2024-06-19 DIAGNOSIS — M79.605 LEFT LEG PAIN: Primary | ICD-10-CM

## 2024-06-19 PROCEDURE — G8427 DOCREV CUR MEDS BY ELIG CLIN: HCPCS | Performed by: NURSE PRACTITIONER

## 2024-06-19 PROCEDURE — 1036F TOBACCO NON-USER: CPT | Performed by: NURSE PRACTITIONER

## 2024-06-19 PROCEDURE — 99205 OFFICE O/P NEW HI 60 MIN: CPT | Performed by: NURSE PRACTITIONER

## 2024-06-19 PROCEDURE — G8417 CALC BMI ABV UP PARAM F/U: HCPCS | Performed by: NURSE PRACTITIONER

## 2024-06-19 PROCEDURE — 1124F ACP DISCUSS-NO DSCNMKR DOCD: CPT | Performed by: NURSE PRACTITIONER

## 2024-06-19 ASSESSMENT — ENCOUNTER SYMPTOMS
GASTROINTESTINAL NEGATIVE: 1
SHORTNESS OF BREATH: 1
COUGH: 0
BACK PAIN: 1
WHEEZING: 0
EYES NEGATIVE: 1

## 2024-06-19 NOTE — PROGRESS NOTES
[facility]  [facility]  [address]  [place] [address]  Dept: [phone]  Dept Fax: [phone]  Loc: [phone]    Visit Date: 6/19/2024    Butch Hatch is a 78 y.o. male who is referred for pain management evaluation and treatment per Dr. Martinez.                CAGE and CAGE-AID Questions   1. In the last three months, have you felt you should cut down or stop drinking or using drugs?  Yes []        No [x]     2. In the last three months, has anyone annoyed you or gotten on your nerves by telling you to cut down or stop drinking or using drugs?  Yes []        No [x]     3. In the last three months, have you felt guilty or bad about how much you drink or use drugs?   Yes []        No [x]     4. In the last three months, have you been waking up wanting to have an alcoholic drink or use drugs?  Yes []        No [x]        Opioid Risk Tool:  Clinician Form       1. Family History of Substance Abuse: Female Male    Alcohol   []1   []3    Illegal drugs   []2   []3    Prescription drugs     []4   []4   2. Personal History of Substance Abuse:          Alcohol   []3   []3    Illegal drugs   []4   []4    Prescription drugs     []5   []5   3. Age (chirag box if between 16 and 45):     []1   []1   4. History of Preadolescent Sexual Abuse:     []3   []0   5. Psychological Disease:      Attention deficit disorder, obsessive-compulsive disorder, bipolar, schizophrenia   []2   []2      Depression     []1   []1    Scoring Totals       Total Score  Low Risk  Moderate Risk  High Risk   Risk Category   0 - 3   4 - 7   8 or Above      Patient states symptoms interfere with:  A.  General Activity:  yes   B.  Mood: yes    C.  Walking Ability:   yes   D.  Normal Work (Includes both work outside the home and housework):   yes    E.  Relations with Other People:  yes   F.  Sleep:   yes   G. Enjoyment of Life:  yes       [facility]

## 2024-07-08 NOTE — PROGRESS NOTES
completed  []  Reviewed Prior HEP      [x]  Patient verbalized and/or demonstrated understanding of education provided.  []  Patient unable to verbalize and/or demonstrate understanding of education provided.  Will continue education.  []  Barriers to learning:     PLAN:  Treatment Recommendations: Strengthening, Range of Motion, Neuromuscular Re-education, Manual Therapy - Soft Tissue Mobilization, Manual Therapy - Joint Manipulation, Pain Management, Home Exercise Program, Patient Education, Integrative Dry Needling, Aquatics, and Modalities    [x]  Plan of care initiated.  Plan to see patient 2 times per week for 8 weeks to address the treatment planned outlined above.  []  Continue with current plan of care  []  Modify plan of care as follows:    []  Hold pending physician visit  []  Discharge    Time In 745   Time Out 847   Timed Code Minutes: 15 min   Total Treatment Time: 62 min     Electronically Signed by: Carlito Carrillo PT

## 2024-07-09 ENCOUNTER — HOSPITAL ENCOUNTER (OUTPATIENT)
Dept: PHYSICAL THERAPY | Age: 79
Setting detail: THERAPIES SERIES
Discharge: HOME | End: 2024-07-09
Payer: MEDICARE

## 2024-07-09 PROCEDURE — 97161 PT EVAL LOW COMPLEX 20 MIN: CPT

## 2024-07-09 PROCEDURE — 97110 THERAPEUTIC EXERCISES: CPT

## 2024-07-11 ENCOUNTER — HOSPITAL ENCOUNTER (OUTPATIENT)
Dept: PHYSICAL THERAPY | Age: 79
Setting detail: THERAPIES SERIES
Discharge: HOME | End: 2024-07-11
Payer: MEDICARE

## 2024-07-11 PROCEDURE — 97110 THERAPEUTIC EXERCISES: CPT

## 2024-07-11 NOTE — PROGRESS NOTES
[facility]  PHYSICAL THERAPY  [] EVALUATION  [x] DAILY NOTE (LAND) [] DAILY NOTE (AQUATIC ) [] PROGRESS NOTE [] DISCHARGE NOTE    [x] OUTPATIENT REHABILITATION CENTER [facility]   [] [place] [place] [facility]    [] [facility]   [] [facility]    Date: 2024  Patient Name:  Butch Hatch  : 1945  MRN: [id number]  CSN: [id number]    Referring Practitioner Boby Lan, SHONNA - CNP    Diagnosis Pain in left leg  Radiculopathy, lumbosacral region  Spinal stenosis, lumbar region with neurogenic claudication  Lumbago with sciatica, left side  Other chronic pain                            Treatment Diagnosis M54.42  Lumbago with Sciatica, Left Side  R29.3 Abnormal Posture  R53.1 Weakness   Date of Evaluation 24    Additional Pertinent History Cervical laminectomy , Arthritis, COPD, cancer, depression, HA, HTN, obesity, type 2 DM, partial removal of pancreas, IVC filter placed within the last 10 years       Functional Outcome Measure Used Oswestry   Functional Outcome Score 11/50 (24)       Insurance: Primary: Payor: MEDICARE /  /  / ,   Secondary: BC   Authorization Information: PRE CERTIFICATION REQUIRED: No precert required.  INSURANCE THERAPY BENEFIT: Allowed 60 visits per calendar year.  0 visits have been used.. Hard Max..   AQUATIC THERAPY COVERED: Yes  MODALITIES COVERED:  Yes  TELEHEALTH COVERED:     Approved Procedure Codes: Authorization of Specific CPT Codes Not Required  (Codes requested indicated by red font, codes approved indicated by black font)   Visit # 2, 2/10 for progress note (Reporting Period: 24 to     )   Visits Allowed: 60   Recertification Date: 9/3/24   Physician Follow-Up: Follow up with after PT, MRI this Friday    Physician Orders: Eval and treat   History of Present Illness: Pt is a 79 yo male presenting to therapy with complaints of low back pain that radiates into L LE. L radicular leg pain began this past Dec/Tom

## 2024-07-12 ENCOUNTER — HOSPITAL ENCOUNTER (OUTPATIENT)
Dept: MRI IMAGING | Age: 79
Discharge: HOME | End: 2024-07-12
Payer: MEDICARE

## 2024-07-12 DIAGNOSIS — M48.062 SPINAL STENOSIS OF LUMBAR REGION WITH NEUROGENIC CLAUDICATION: ICD-10-CM

## 2024-07-12 DIAGNOSIS — M54.17 LUMBOSACRAL RADICULITIS: ICD-10-CM

## 2024-07-12 DIAGNOSIS — M79.605 LEFT LEG PAIN: ICD-10-CM

## 2024-07-12 DIAGNOSIS — M54.42 CHRONIC MIDLINE LOW BACK PAIN WITH LEFT-SIDED SCIATICA: ICD-10-CM

## 2024-07-12 DIAGNOSIS — G89.29 CHRONIC MIDLINE LOW BACK PAIN WITH LEFT-SIDED SCIATICA: ICD-10-CM

## 2024-07-12 PROCEDURE — 72148 MRI LUMBAR SPINE W/O DYE: CPT

## 2024-07-15 ENCOUNTER — HOSPITAL ENCOUNTER (OUTPATIENT)
Dept: PHYSICAL THERAPY | Age: 79
Setting detail: THERAPIES SERIES
Discharge: HOME | End: 2024-07-15
Payer: MEDICARE

## 2024-07-15 PROCEDURE — 97110 THERAPEUTIC EXERCISES: CPT

## 2024-07-15 NOTE — PROGRESS NOTES
for periods longer than 2 hours.  2. Patient will improve trunk AROM to WNL and pain free to improve ability to bend and lift.  3. Patient will improve L hip abduction and flexion strength to 4+/5 to improve ability to ambulate with a normalized gait pattern.  4. Patient will increase bilateral  90/90 hamstring length to 10 degrees from total knee extension to promote reduced pain and ease of functional mobility tasks.  5. Patient will be indep with HEP in order to meet long term goals.    Long Term Goals:  Time Frame: 8 weeks     1. Patient will improve Modified Oswestry score from 11/50 to 5/50 to allow decrease in disability and improved functional mobility for improved overall QOL.  2. Patient will be indep with HEP in order to prevent re-injury and improve ability to perform functional mobility tasks.   3. Patient will report increased walking tolerance to 20 mins d/t increase LE strength and improved endurance to be able to navigate community distances.         Patient Education:   [x]  HEP/Education Completed: Plan of Care, Goals, HEP initiated with hand outs given  OpTier Access Code: [email or web address]   []  No new Education completed  []  Reviewed Prior HEP      [x]  Patient verbalized and/or demonstrated understanding of education provided.  []  Patient unable to verbalize and/or demonstrate understanding of education provided.  Will continue education.  []  Barriers to learning:     PLAN:  Treatment Recommendations: Strengthening, Range of Motion, Neuromuscular Re-education, Manual Therapy - Soft Tissue Mobilization, Manual Therapy - Joint Manipulation, Pain Management, Home Exercise Program, Patient Education, Integrative Dry Needling, Aquatics, and Modalities    []  Plan of care initiated.  Plan to see patient 2 times per week for 8 weeks to address the treatment planned outlined above.  [x]  Continue with current plan of care  []  Modify plan of care as follows:    []  Hold pending physician visit  []

## 2024-07-18 ENCOUNTER — HOSPITAL ENCOUNTER (OUTPATIENT)
Dept: PHYSICAL THERAPY | Age: 79
Setting detail: THERAPIES SERIES
Discharge: HOME | End: 2024-07-18
Payer: MEDICARE

## 2024-07-18 PROCEDURE — 97110 THERAPEUTIC EXERCISES: CPT

## 2024-07-18 NOTE — PROGRESS NOTES
[facility]  PHYSICAL THERAPY  [] EVALUATION  [x] DAILY NOTE (LAND) [] DAILY NOTE (AQUATIC ) [] PROGRESS NOTE [] DISCHARGE NOTE    [x] OUTPATIENT REHABILITATION CENTER [facility]   [] [facility]    [] [facility]   [] [facility]    Date: 2024  Patient Name:  Butch Hatch  : 1945  MRN: [id number]  CSN: [id number]    Referring Practitioner Boby Lan, SHONNA - CNP    Diagnosis Pain in left leg  Radiculopathy, lumbosacral region  Spinal stenosis, lumbar region with neurogenic claudication  Lumbago with sciatica, left side  Other chronic pain                            Treatment Diagnosis M54.42  Lumbago with Sciatica, Left Side  R29.3 Abnormal Posture  R53.1 Weakness   Date of Evaluation 24    Additional Pertinent History Cervical laminectomy , Arthritis, COPD, cancer, depression, HA, HTN, obesity, type 2 DM, partial removal of pancreas, IVC filter placed within the last 10 years       Functional Outcome Measure Used Oswestry   Functional Outcome Score 11/50 (24)       Insurance: Primary: Payor: MEDICARE /  /  / ,   Secondary: BC   Authorization Information: PRE CERTIFICATION REQUIRED: No precert required.  INSURANCE THERAPY BENEFIT: Allowed 60 visits per calendar year.  0 visits have been used.. Hard Max..   AQUATIC THERAPY COVERED: Yes  MODALITIES COVERED:  Yes  TELEHEALTH COVERED:     Approved Procedure Codes: Authorization of Specific CPT Codes Not Required  (Codes requested indicated by red font, codes approved indicated by black font)   Visit # 4, 4/10 for progress note (Reporting Period: 24 to     )   Visits Allowed: 60   Recertification Date: 9/3/24   Physician Follow-Up: Follow up with after PT, MRI this Friday    Physician Orders: Eval and treat   History of Present Illness: Pt is a 79 yo male presenting to therapy with complaints of low back pain that radiates into L LE. L radicular leg pain began this past Dec/Tom

## 2024-07-23 ENCOUNTER — HOSPITAL ENCOUNTER (EMERGENCY)
Age: 79
Discharge: LEFT BEFORE DISCHARGE | End: 2024-07-23

## 2024-07-23 ENCOUNTER — HOSPITAL ENCOUNTER (OUTPATIENT)
Dept: PHYSICAL THERAPY | Age: 79
Setting detail: THERAPIES SERIES
Discharge: HOME | End: 2024-07-23
Payer: MEDICARE

## 2024-07-23 PROCEDURE — 97110 THERAPEUTIC EXERCISES: CPT

## 2024-07-23 NOTE — ED NOTES
Patient brought back to room 10. Patient asked what he was doing here and refused to get into bed. Patient reported he does not want to be seen. [facility] Patient alert and oriented x4.

## 2024-07-23 NOTE — PROGRESS NOTES
[facility]  PHYSICAL THERAPY  [] EVALUATION  [x] DAILY NOTE (LAND) [] DAILY NOTE (AQUATIC ) [] PROGRESS NOTE [] DISCHARGE NOTE    [x] OUTPATIENT REHABILITATION CENTER [facility]   [] [place] [facility]    [] [facility]   [] [facility]    Date: 2024  Patient Name:  Butch Hatch  : 1945  MRN: [id number]  CSN: [id number]    Referring Practitioner Boby Lan, SHONNA - CNP    Diagnosis Pain in left leg  Radiculopathy, lumbosacral region  Spinal stenosis, lumbar region with neurogenic claudication  Lumbago with sciatica, left side  Other chronic pain                            Treatment Diagnosis M54.42  Lumbago with Sciatica, Left Side  R29.3 Abnormal Posture  R53.1 Weakness   Date of Evaluation 24    Additional Pertinent History Cervical laminectomy , Arthritis, COPD, cancer, depression, HA, HTN, obesity, type 2 DM, partial removal of pancreas, IVC filter placed within the last 10 years       Functional Outcome Measure Used Oswestry   Functional Outcome Score 11/50 (24)       Insurance: Primary: Payor: MEDICARE /  /  / ,   Secondary: BC   Authorization Information: PRE CERTIFICATION REQUIRED: No precert required.  INSURANCE THERAPY BENEFIT: Allowed 60 visits per calendar year.  0 visits have been used.. Hard Max..   AQUATIC THERAPY COVERED: Yes  MODALITIES COVERED:  Yes  TELEHEALTH COVERED:     Approved Procedure Codes: Authorization of Specific CPT Codes Not Required  (Codes requested indicated by red font, codes approved indicated by black font)   Visit # 5, 5/10 for progress note (Reporting Period: 24 to     )   Visits Allowed: 60   Recertification Date: 9/3/24   Physician Follow-Up: Follow up with after PT, MRI this Friday    Physician Orders: Eval and treat   History of Present Illness: Pt is a 79 yo male presenting to therapy with complaints of low back pain that radiates into L LE. L radicular leg pain began this past Dec/Tom

## 2024-07-24 ENCOUNTER — OFFICE VISIT (OUTPATIENT)
Dept: PHYSICAL MEDICINE AND REHAB | Age: 79
End: 2024-07-24
Payer: MEDICARE

## 2024-07-24 VITALS
BODY MASS INDEX: 31.06 KG/M2 | SYSTOLIC BLOOD PRESSURE: 116 MMHG | HEIGHT: 70 IN | WEIGHT: 216.93 LBS | DIASTOLIC BLOOD PRESSURE: 64 MMHG

## 2024-07-24 DIAGNOSIS — M47.816 LUMBAR SPONDYLOSIS: ICD-10-CM

## 2024-07-24 DIAGNOSIS — M47.816 LUMBAR FACET ARTHROPATHY: ICD-10-CM

## 2024-07-24 DIAGNOSIS — M48.062 SPINAL STENOSIS OF LUMBAR REGION WITH NEUROGENIC CLAUDICATION: Primary | ICD-10-CM

## 2024-07-24 PROCEDURE — 1036F TOBACCO NON-USER: CPT | Performed by: NURSE PRACTITIONER

## 2024-07-24 PROCEDURE — G8417 CALC BMI ABV UP PARAM F/U: HCPCS | Performed by: NURSE PRACTITIONER

## 2024-07-24 PROCEDURE — 1124F ACP DISCUSS-NO DSCNMKR DOCD: CPT | Performed by: NURSE PRACTITIONER

## 2024-07-24 PROCEDURE — G8427 DOCREV CUR MEDS BY ELIG CLIN: HCPCS | Performed by: NURSE PRACTITIONER

## 2024-07-24 PROCEDURE — 99214 OFFICE O/P EST MOD 30 MIN: CPT | Performed by: NURSE PRACTITIONER

## 2024-07-24 ASSESSMENT — ENCOUNTER SYMPTOMS
GASTROINTESTINAL NEGATIVE: 1
EYES NEGATIVE: 1
WHEEZING: 0
SHORTNESS OF BREATH: 0
BACK PAIN: 1
COUGH: 0

## 2024-07-24 NOTE — PROGRESS NOTES
Lumbar back: Bony tenderness present. Normal range of motion. Negative right straight leg raise test and negative left straight leg raise test.   Neurological:      General: No focal deficit present.      Mental Status: He is alert and oriented to person, place, and time.      Sensory: No sensory deficit.      Motor: No weakness.      Gait: Gait normal.      Comments: 5/5 strength all extremities    Psychiatric:         Mood and Affect: Mood normal.         Behavior: Behavior normal.       LANCE  Patricks test  negative  Yeoman's  or Gaenslen's negative       Assessment:     1. Spinal stenosis of lumbar region with neurogenic claudication    2. Lumbar spondylosis    3. Lumbar facet arthropathy         Assessment & Plan   Plan:      OARRS reviewed. Current MED: 0  Patient was not offered naloxone for home.   Discussed long term side effects of medications, tolerance, dependency and addiction.  Previous UDS reviewed  UDS preformed today for compliance.  Patient told can not receive any pain medications from any other source.  No evidence of abuse, diversion or aberrant behavior.  Medications and/or procedures to improve function and quality of life- patient understanding with this and that may not be pain free  Discussed with patient about safe storage of medications at home  Discussed possible weaning of medication dosing dependent on treatment/procedure results.   Discussed with patient about risks with procedure including infection, reaction to medication, increased pain, or bleeding.  Reviewed lumbar MRI with patient and his wife. Reviewed lumbar xray   Pain has greatly improved with therapy. Not having any radicular pain and pain is very minimal. He is going to stop therapy as he states he can continue these exercises at home   Discussed left TFLESI if pain returns   Continue tylenol prn, Celebrex from pcp   Doing well at this time and not having pain. Will follow up prn if pain returns.     Meds. Prescribed:

## 2024-07-26 ENCOUNTER — APPOINTMENT (OUTPATIENT)
Dept: PHYSICAL THERAPY | Age: 79
End: 2024-07-26
Payer: MEDICARE

## 2024-07-29 ENCOUNTER — APPOINTMENT (OUTPATIENT)
Dept: PHYSICAL THERAPY | Age: 79
End: 2024-07-29
Payer: MEDICARE

## 2024-08-22 ENCOUNTER — TELEPHONE (OUTPATIENT)
Dept: FAMILY MEDICINE CLINIC | Age: 79
End: 2024-08-22

## 2024-08-22 ENCOUNTER — OFFICE VISIT (OUTPATIENT)
Dept: FAMILY MEDICINE CLINIC | Age: 79
End: 2024-08-22

## 2024-08-22 VITALS
TEMPERATURE: 98.1 F | OXYGEN SATURATION: 87 % | SYSTOLIC BLOOD PRESSURE: 122 MMHG | DIASTOLIC BLOOD PRESSURE: 54 MMHG | WEIGHT: 215 LBS | HEIGHT: 69 IN | HEART RATE: 94 BPM | BODY MASS INDEX: 31.84 KG/M2

## 2024-08-22 DIAGNOSIS — R30.0 DYSURIA: Primary | ICD-10-CM

## 2024-08-22 DIAGNOSIS — R97.20 ELEVATED PSA: Primary | ICD-10-CM

## 2024-08-22 DIAGNOSIS — N41.0 ACUTE PROSTATITIS: ICD-10-CM

## 2024-08-22 DIAGNOSIS — J44.9 CHRONIC OBSTRUCTIVE PULMONARY DISEASE, UNSPECIFIED COPD TYPE (HCC): ICD-10-CM

## 2024-08-22 DIAGNOSIS — E31.21 MEN 1 SYNDROME (HCC): ICD-10-CM

## 2024-08-22 DIAGNOSIS — R35.0 URINARY FREQUENCY: ICD-10-CM

## 2024-08-22 DIAGNOSIS — E11.9 TYPE 2 DIABETES MELLITUS WITHOUT COMPLICATION, WITHOUT LONG-TERM CURRENT USE OF INSULIN (HCC): ICD-10-CM

## 2024-08-22 LAB
BILIRUBIN, POC: ABNORMAL
BLOOD URINE, POC: ABNORMAL
CLARITY, POC: CLEAR
COLOR, POC: ABNORMAL
GLUCOSE URINE, POC: ABNORMAL
KETONES, POC: ABNORMAL
LEUKOCYTE EST, POC: ABNORMAL
NITRITE, POC: ABNORMAL
PH, POC: 6.5
PROSTATE SPECIFIC ANTIGEN: 8.82 NG/ML
PROTEIN, POC: ABNORMAL
SPECIFIC GRAVITY, POC: 1.02
UROBILINOGEN, POC: 1

## 2024-08-22 RX ORDER — PEN NEEDLE, DIABETIC 32 GX 1/4"
NEEDLE, DISPOSABLE MISCELLANEOUS
COMMUNITY
Start: 2024-06-28

## 2024-08-22 RX ORDER — CIPROFLOXACIN 500 MG/1
500 TABLET, FILM COATED ORAL 2 TIMES DAILY
Qty: 20 TABLET | Refills: 0 | Status: SHIPPED | OUTPATIENT
Start: 2024-08-22 | End: 2024-09-01

## 2024-08-22 ASSESSMENT — ENCOUNTER SYMPTOMS
DIARRHEA: 0
SHORTNESS OF BREATH: 0
COUGH: 0
SORE THROAT: 0
SINUS PRESSURE: 0
CONSTIPATION: 0
NAUSEA: 0
EYE PAIN: 0
ABDOMINAL DISTENTION: 0
ABDOMINAL PAIN: 0
RHINORRHEA: 0

## 2024-08-22 NOTE — PROGRESS NOTES
[facility] PROFESSIONAL SERVS  [facility]  [address] [address]  Dept: [phone]  Dept Fax: [phone]  Loc: [phone]  PROGRESS NOTE      VisitDate: 8/22/2024    Butch Hatch is a 79 y.o. male who presents today for:  Chief Complaint   Patient presents with    Urinary Tract Infection     Urinary frequency and dysuria that started two days ago.        Impression/Plan:  1. Dysuria    2. Urinary frequency    3. Acute prostatitis    4. MEN 1 syndrome (HCC)    5. Type 2 diabetes mellitus without complication, without long-term current use of insulin (HCC)    6. Chronic obstructive pulmonary disease, unspecified COPD type (HCC)      Requested Prescriptions     Signed Prescriptions Disp Refills    ciprofloxacin (CIPRO) 500 MG tablet 20 tablet 0     Sig: Take 1 tablet by mouth 2 times daily for 10 days     Orders Placed This Encounter   Procedures    PSA, Prostatic Specific Antigen     Standing Status:   Future     Standing Expiration Date:   8/22/2025    POCT Urinalysis No Micro (Auto)         Subjective:  HPI  Patient comes in reports he has had decreased force of urine stream and increasing frequency of urination for the past couple of days.  Some occasional discomfort when urinating.  No gross hematuria.  No back pain or pelvic pain.  He states he did have some constipation that was relieved with Senokot a couple weeks ago.    Review of Systems   Constitutional:  Negative for appetite change and fever.   HENT:  Negative for congestion, ear pain, postnasal drip, rhinorrhea, sinus pressure and sore throat.    Eyes:  Negative for pain and visual disturbance.   Respiratory:  Negative for cough and shortness of breath.    Cardiovascular:  Negative for chest pain.   Gastrointestinal:  Negative for abdominal distention, abdominal pain, constipation, diarrhea and nausea.   Genitourinary:  Positive for dysuria and frequency. Negative for urgency.   Musculoskeletal:

## 2024-08-22 NOTE — TELEPHONE ENCOUNTER
----- Message from Dr. Alfa Martinez MD sent at 8/22/2024 12:40 PM EDT -----  Inform patient PSA is significantly elevated, recommend repeat PSA 1 to 2 weeks after symptoms have resolved and completing his antibiotics

## 2024-08-27 ENCOUNTER — PATIENT MESSAGE (OUTPATIENT)
Dept: FAMILY MEDICINE CLINIC | Age: 79
End: 2024-08-27

## 2024-08-28 ENCOUNTER — OFFICE VISIT (OUTPATIENT)
Dept: FAMILY MEDICINE CLINIC | Age: 79
End: 2024-08-28

## 2024-08-28 VITALS
HEART RATE: 90 BPM | WEIGHT: 215 LBS | TEMPERATURE: 97.8 F | DIASTOLIC BLOOD PRESSURE: 70 MMHG | SYSTOLIC BLOOD PRESSURE: 116 MMHG | BODY MASS INDEX: 31.75 KG/M2 | OXYGEN SATURATION: 92 %

## 2024-08-28 DIAGNOSIS — R30.0 DYSURIA: ICD-10-CM

## 2024-08-28 DIAGNOSIS — M47.816 LUMBAR SPONDYLOSIS: ICD-10-CM

## 2024-08-28 DIAGNOSIS — N41.0 ACUTE PROSTATITIS: Primary | ICD-10-CM

## 2024-08-28 LAB
BILIRUBIN, POC: ABNORMAL
BLOOD URINE, POC: ABNORMAL
CLARITY, POC: ABNORMAL
COLOR, POC: ABNORMAL
GLUCOSE URINE, POC: ABNORMAL
KETONES, POC: ABNORMAL
LEUKOCYTE EST, POC: ABNORMAL
NITRITE, POC: ABNORMAL
PH, POC: 5.5
PROTEIN, POC: ABNORMAL
SPECIFIC GRAVITY, POC: >=1.03
UROBILINOGEN, POC: 1

## 2024-08-28 RX ORDER — HYDROCODONE BITARTRATE AND ACETAMINOPHEN 5; 325 MG/1; MG/1
1 TABLET ORAL EVERY 6 HOURS PRN
Qty: 28 TABLET | Refills: 0 | Status: SHIPPED | OUTPATIENT
Start: 2024-08-28 | End: 2024-08-28 | Stop reason: SDUPTHER

## 2024-08-28 RX ORDER — HYDROCODONE BITARTRATE AND ACETAMINOPHEN 5; 325 MG/1; MG/1
1 TABLET ORAL EVERY 6 HOURS PRN
Qty: 28 TABLET | Refills: 0 | Status: CANCELLED | OUTPATIENT
Start: 2024-08-28 | End: 2024-09-04

## 2024-08-28 RX ORDER — HYDROCODONE BITARTRATE AND ACETAMINOPHEN 5; 325 MG/1; MG/1
1 TABLET ORAL EVERY 6 HOURS PRN
Qty: 28 TABLET | Refills: 0 | Status: SHIPPED | OUTPATIENT
Start: 2024-08-28 | End: 2024-09-04

## 2024-08-28 ASSESSMENT — ENCOUNTER SYMPTOMS
BACK PAIN: 1
CONSTIPATION: 0
RHINORRHEA: 0
SORE THROAT: 0
SINUS PRESSURE: 0
SHORTNESS OF BREATH: 0
DIARRHEA: 0
EYE PAIN: 0
COUGH: 0
ABDOMINAL PAIN: 0
NAUSEA: 0
ABDOMINAL DISTENTION: 0

## 2024-08-28 NOTE — TELEPHONE ENCOUNTER
Lazara wife states [facility] [address] does not have [place] 5/325 for a few days. I checked with [facility] [address] and they do have #28. Lazara would like Rx sent to [facility]. Pharmacist states [facility] cannot be transferred.     Lazara was informed  would address later today/tonight and was advised to check with pharmacy first

## 2024-08-28 NOTE — PROGRESS NOTES
[facility] wife states [facility] would be a few days before they get [place] 5/325 in stock.    [facility] to [facility]. [facility]     [facility]  
regular rhythm.      Heart sounds: Normal heart sounds.   Pulmonary:      Effort: Pulmonary effort is normal.      Breath sounds: Normal breath sounds. No wheezing or rales.   Musculoskeletal:         General: Tenderness present.      Comments: Paraspinous muscle tenderness in the lower lumbar spine and SI joint area   Skin:     General: Skin is warm and dry.      Coloration: Skin is not pale.   Neurological:      Mental Status: He is alert and oriented to person, place, and time.       /70 (Site: Left Upper Arm)   Pulse 90   Temp 97.8 °F (36.6 °C) (Oral)   Wt 97.5 kg (215 lb)   SpO2 92%   BMI 31.75 kg/m²       Patient giveneducational materials - see patient instructions.  Discussed use, benefit, and side effects of prescribed medications.  All patient questions answered.  Pt voiced understanding. Reviewed health maintenance. Patient agreedwith treatment plan. Follow up as directed.   PDMP Monitoring:    Last PDMP Alfa as Reviewed:  Review User Review Instant Review Result            Urine Drug Screenings (1 yr)    No resulted procedures found.       Medication Contract and Consent for Opioid Use Documents Filed        No documents found                   **This report has been created using voice recognition software. It may contain minor errorswhich are inherent in voice recognition technology.**       Electronically signed by Alfa Martinez MD on 8/28/2024 at 9:52 AM

## 2024-08-29 LAB
BACTERIA UR CULT: ABNORMAL
BACTERIA UR CULT: ABNORMAL
ORGANISM: ABNORMAL

## 2024-09-03 ENCOUNTER — TELEPHONE (OUTPATIENT)
Dept: FAMILY MEDICINE CLINIC | Age: 79
End: 2024-09-03

## 2024-09-03 RX ORDER — FLUCONAZOLE 100 MG/1
100 TABLET ORAL DAILY
Qty: 5 TABLET | Refills: 0 | Status: SHIPPED | OUTPATIENT
Start: 2024-09-03 | End: 2024-09-04

## 2024-09-03 NOTE — TELEPHONE ENCOUNTER
----- Message from Dr. Alfa Martinez MD sent at 9/3/2024  8:37 AM EDT -----  Inform urine culture showed growth of yeast recommend Diflucan 100 mg daily for 5 days

## 2024-09-04 RX ORDER — FLUCONAZOLE 100 MG/1
100 TABLET ORAL DAILY
Qty: 5 TABLET | Refills: 0 | Status: SHIPPED | OUTPATIENT
Start: 2024-09-04 | End: 2024-09-09

## 2024-09-10 ENCOUNTER — LAB (OUTPATIENT)
Dept: LAB | Age: 79
End: 2024-09-10

## 2024-09-10 DIAGNOSIS — R97.20 ELEVATED PSA: ICD-10-CM

## 2024-09-10 LAB — PSA SERPL-MCNC: 1.79 NG/ML (ref 0–1)

## 2024-09-17 ENCOUNTER — PATIENT MESSAGE (OUTPATIENT)
Dept: FAMILY MEDICINE CLINIC | Age: 79
End: 2024-09-17

## 2024-09-17 DIAGNOSIS — R35.0 URINARY FREQUENCY: ICD-10-CM

## 2024-09-17 DIAGNOSIS — R97.20 ELEVATED PSA: Primary | ICD-10-CM

## 2024-12-19 ENCOUNTER — OFFICE VISIT (OUTPATIENT)
Dept: FAMILY MEDICINE CLINIC | Age: 79
End: 2024-12-19

## 2024-12-19 VITALS
RESPIRATION RATE: 18 BRPM | HEART RATE: 84 BPM | HEIGHT: 69 IN | OXYGEN SATURATION: 95 % | BODY MASS INDEX: 34.42 KG/M2 | DIASTOLIC BLOOD PRESSURE: 64 MMHG | SYSTOLIC BLOOD PRESSURE: 132 MMHG | TEMPERATURE: 97.9 F | WEIGHT: 232.4 LBS

## 2024-12-19 DIAGNOSIS — R60.9 DEPENDENT EDEMA: Primary | ICD-10-CM

## 2024-12-19 RX ORDER — BUMETANIDE 0.5 MG/1
0.5 TABLET ORAL DAILY
Qty: 5 TABLET | Refills: 0 | Status: SHIPPED | OUTPATIENT
Start: 2024-12-19 | End: 2024-12-24

## 2024-12-19 RX ORDER — CELECOXIB 200 MG/1
200 CAPSULE ORAL 2 TIMES DAILY
Qty: 60 CAPSULE | Refills: 0 | Status: SHIPPED | OUTPATIENT
Start: 2024-12-19

## 2024-12-19 ASSESSMENT — ENCOUNTER SYMPTOMS
COUGH: 0
EYE PAIN: 0
CONSTIPATION: 0
ABDOMINAL PAIN: 0
SINUS PRESSURE: 0
SORE THROAT: 0
DIARRHEA: 0
RHINORRHEA: 0
SHORTNESS OF BREATH: 1
ABDOMINAL DISTENTION: 0
NAUSEA: 0

## 2024-12-19 NOTE — PROGRESS NOTES
person, place, and time.       /64 (Site: Right Upper Arm, Position: Sitting, Cuff Size: Large Adult)   Pulse 84   Temp 97.9 °F (36.6 °C) (Oral)   Resp 18   Ht 1.753 m (5' 9\")   Wt 105.4 kg (232 lb 6.4 oz)   SpO2 95% Comment: 1L  BMI 34.32 kg/m²       Patient giveneducational materials - see patient instructions.  Discussed use, benefit, and side effects of prescribed medications.  All patient questions answered.  Pt voiced understanding. Reviewed health maintenance. Patient agreedwith treatment plan. Follow up as directed.   PDMP Monitoring:    Last PDMP Alfa as Reviewed:  Review User Review Instant Review Result            Urine Drug Screenings (1 yr)    No resulted procedures found.       Medication Contract and Consent for Opioid Use Documents Filed        No documents found                   **This report has been created using voice recognition software. It may contain minor errorswhich are inherent in voice recognition technology.**  The patient (or guardian, if applicable) and other individuals in attendance with the patient were advised that Artificial Intelligence will be utilized during this visit to record, process the conversation to generate a clinical note, and support improvement of the AI technology. The patient (or guardian, if applicable) and other individuals in attendance at the appointment consented to the use of AI, including the recording.          The patient (or guardian, if applicable) and other individuals in attendance with the patient were advised that Artificial Intelligence will be utilized during this visit to record, process the conversation to generate a clinical note, and support improvement of the AI technology. The patient (or guardian, if applicable) and other individuals in attendance at the appointment consented to the use of AI, including the recording.                           Electronically signed by Alfa Martinez MD on 12/19/2024 at 9:07 AM

## 2025-01-22 RX ORDER — CELECOXIB 200 MG/1
200 CAPSULE ORAL 2 TIMES DAILY
Qty: 60 CAPSULE | Refills: 0 | Status: SHIPPED | OUTPATIENT
Start: 2025-01-22

## 2025-02-19 RX ORDER — CELECOXIB 200 MG/1
200 CAPSULE ORAL 2 TIMES DAILY
Qty: 60 CAPSULE | Refills: 5 | Status: SHIPPED | OUTPATIENT
Start: 2025-02-19

## 2025-03-04 ENCOUNTER — LAB (OUTPATIENT)
Dept: LAB | Age: 80
End: 2025-03-04

## 2025-03-04 DIAGNOSIS — R35.0 URINARY FREQUENCY: ICD-10-CM

## 2025-03-04 DIAGNOSIS — R97.20 ELEVATED PSA: ICD-10-CM

## 2025-03-04 LAB — PSA SERPL-MCNC: 0.52 NG/ML (ref 0–1)

## 2025-03-16 ENCOUNTER — PATIENT MESSAGE (OUTPATIENT)
Dept: FAMILY MEDICINE CLINIC | Age: 80
End: 2025-03-16

## 2025-03-16 DIAGNOSIS — R60.9 DEPENDENT EDEMA: ICD-10-CM

## 2025-03-18 DIAGNOSIS — R60.9 DEPENDENT EDEMA: ICD-10-CM

## 2025-03-18 RX ORDER — BUMETANIDE 0.5 MG/1
0.5 TABLET ORAL DAILY PRN
Qty: 30 TABLET | Refills: 0 | Status: SHIPPED | OUTPATIENT
Start: 2025-03-18

## 2025-03-18 RX ORDER — BUMETANIDE 0.5 MG/1
TABLET ORAL
Qty: 90 TABLET | OUTPATIENT
Start: 2025-03-18

## 2025-03-18 NOTE — TELEPHONE ENCOUNTER
Bumex 0.5mg daily prn swelling 30/0rf was escribed to [facility] per Dr. Martinez's order.  Adry wife was informed.

## 2025-03-28 ENCOUNTER — HOSPITAL ENCOUNTER (OUTPATIENT)
Age: 80
Discharge: HOME | End: 2025-03-28
Attending: INTERNAL MEDICINE
Payer: MEDICARE

## 2025-03-28 ENCOUNTER — HOSPITAL ENCOUNTER (OUTPATIENT)
Dept: INTERVENTIONAL RADIOLOGY/VASCULAR | Age: 80
Discharge: HOME | End: 2025-03-30
Attending: INTERNAL MEDICINE
Payer: MEDICARE

## 2025-03-28 ENCOUNTER — HOSPITAL ENCOUNTER (EMERGENCY)
Age: 80
Discharge: HOME | End: 2025-03-28
Payer: MEDICARE

## 2025-03-28 VITALS
OXYGEN SATURATION: 94 % | RESPIRATION RATE: 18 BRPM | HEART RATE: 83 BPM | SYSTOLIC BLOOD PRESSURE: 156 MMHG | BODY MASS INDEX: 32.49 KG/M2 | DIASTOLIC BLOOD PRESSURE: 80 MMHG | TEMPERATURE: 98.7 F | WEIGHT: 220 LBS

## 2025-03-28 DIAGNOSIS — M79.605 LEFT LEG PAIN: Primary | ICD-10-CM

## 2025-03-28 DIAGNOSIS — I82.4Z3 DEEP VEIN THROMBOSIS (DVT) OF DISTAL VEIN OF BOTH LOWER EXTREMITIES, UNSPECIFIED CHRONICITY: ICD-10-CM

## 2025-03-28 LAB
BASOPHILS ABSOLUTE: 0.1 THOU/MM3 (ref 0–0.1)
BASOPHILS NFR BLD AUTO: 0.9 %
DEPRECATED RDW RBC AUTO: 46.1 FL (ref 35–45)
EOSINOPHIL NFR BLD AUTO: 5.6 %
EOSINOPHILS ABSOLUTE: 0.7 THOU/MM3 (ref 0–0.4)
ERYTHROCYTE [DISTWIDTH] IN BLOOD BY AUTOMATED COUNT: 12.6 % (ref 11.5–14.5)
HCT VFR BLD AUTO: 35.5 % (ref 42–52)
HGB BLD-MCNC: 10.7 GM/DL (ref 14–18)
IMM GRANULOCYTES # BLD AUTO: 0.04 THOU/MM3 (ref 0–0.07)
IMM GRANULOCYTES NFR BLD AUTO: 0.3 %
LYMPHOCYTES ABSOLUTE: 3.1 THOU/MM3 (ref 1–4.8)
LYMPHOCYTES NFR BLD AUTO: 25.5 %
MCH RBC QN AUTO: 30.4 PG (ref 26–33)
MCHC RBC AUTO-ENTMCNC: 30.1 GM/DL (ref 32.2–35.5)
MCV RBC AUTO: 100.9 FL (ref 80–94)
MONOCYTES ABSOLUTE: 1.6 THOU/MM3 (ref 0.4–1.3)
MONOCYTES NFR BLD AUTO: 12.7 %
NEUTROPHILS ABSOLUTE: 6.8 THOU/MM3 (ref 1.8–7.7)
NEUTROPHILS NFR BLD AUTO: 55 %
NRBC BLD AUTO-RTO: 0 /100 WBC
PLATELET # BLD AUTO: 300 THOU/MM3 (ref 130–400)
PMV BLD AUTO: 11.2 FL (ref 9.4–12.4)
RBC # BLD AUTO: 3.52 MILL/MM3 (ref 4.7–6.1)
WBC # BLD AUTO: 12.3 THOU/MM3 (ref 4.8–10.8)

## 2025-03-28 PROCEDURE — 99283 EMERGENCY DEPT VISIT LOW MDM: CPT

## 2025-03-28 PROCEDURE — 93970 EXTREMITY STUDY: CPT

## 2025-03-28 PROCEDURE — 85025 COMPLETE CBC W/AUTO DIFF WBC: CPT

## 2025-03-28 ASSESSMENT — PAIN - FUNCTIONAL ASSESSMENT: PAIN_FUNCTIONAL_ASSESSMENT: NONE - DENIES PAIN

## 2025-03-28 NOTE — ED PROVIDER NOTES
[facility] EMERGENCY DEPARTMENT      EMERGENCY MEDICINE     Pt Name: Butch Hatch  MRN: [id number]  Birthdate 1945  Date of evaluation: 3/28/2025  Provider: MALORIE Greenberg    CHIEF COMPLAINT       Chief Complaint   Patient presents with    Leg Swelling     HISTORY OF PRESENT ILLNESS   Butch Hatch is a pleasant 79 y.o. male with PMHx of type 2 diabetes mellitus, zollinger-hansen syndrome, MEN 1 syndrome, COPD, and IVC filter placement who presents to the emergency department from from home, by private vehicle for evaluation of right leg swelling. History is obtained from patient and patient's wife. He states that his leg has been swollen for greater than four weeks and that he has done two separate 5 day courses of bumetanide for the swelling. His PCP, Dr. Higgins, then ordered vascular duplex of the lower extremity today.  The patient states that he was upstairs getting an ultrasound when the department recommended that he be evaluated by the emergency department \"to get a blood thinner\" He denies current leg pain or warmth to the extremity. He denies paresthesias or weakness in the extremities.     Patient denies fever, fatigue, headache, dizziness, disorientation, chest pain, cough, shortness of breath, abdominal pain, nausea, vomiting, diarrhea, constipation, dysuria, hematuria, or increased urinary frequency. Patient denies recent illnesses or sick contacts. He has a surgical history of partial pancreas resection, cholecystectomy, and splenectomy.     The patient states he currently has an IVC filter in place that was put in in the 90s following a surgery for his MEN 1 syndrome. Additionally, he has history of a DVT in the left leg that he was anticoagulated with Eliquis for 3 months in 2021. He denies current anticoagulation.     Patient is the primary historian and is reliable.       PASTMEDICAL HISTORY     Past Medical History:   Diagnosis Date    Arthritis     Cancer (HCC) 1990     puff in the evening., Disp-3 each, R-3Please dispense 90 day supplyNormal      tiotropium (SPIRIVA RESPIMAT) 2.5 MCG/ACT AERS inhaler USE 2 INHALATIONS DAILY, Disp-12 g, R-3Normal      albuterol sulfate HFA (VENTOLIN HFA) 108 (90 Base) MCG/ACT inhaler Inhale 2 puffs into the lungs 4 times daily as needed for Wheezing, Disp-18 g, R-5Do not fill right now, pt will call when needed. This is annual refillNormal      lisinopril (PRINIVIL;ZESTRIL) 10 MG tablet Take 1 tablet by mouth 2 times dailyHistorical Med      doxazosin (CARDURA) 4 MG tablet TAKE 1 TABLET BY MOUTH EVERY DAYHistorical Med      metFORMIN (GLUCOPHAGE) 500 MG tablet Take 1 tablet by mouth 2 times daily (with meals) HOLD 48 HRS PRIOR TO SURGERY, Disp-60 tablet, R-5Normal      albuterol (PROVENTIL) (2.5 MG/3ML) 0.083% nebulizer solution Take 3 mLs by nebulization every 6 hours as needed for Wheezing or Shortness of Breath, Disp-120 each, R-3Normal      BD INSULIN SYRINGE U/F 31G X 16\" 0.5 ML MISC USE AS DIRECTED UNDER THE SKIN TWICE DAILY, DAWHistorical Med      atorvastatin (LIPITOR) 10 MG tablet Take 1 tablet by mouth nightlyHistorical Med      OXYGEN Inhale into the lungs 1 L at rest and 3 L with activityHistorical Med      TRAVATAN Z 0.004 % SOLN ophthalmic solution Place 1 drop into both eyes nightlyHistorical Med      Insulin Lispro Prot & Lispro (HUMALOG MIX 75/25 SC) Inject 30 Units into the skin 2 times daily Historical Med      quinapril (ACCUPRIL) 20 MG tablet Take 1 tablet by mouth 2 times dailyHistorical Med      esomeprazole (NEXIUM) 40 MG delayed release capsule Take 1 capsule by mouth 2 times dailyHistorical Med             ALLERGIES     is allergic to propofol and bee venom.    FAMILY HISTORY     He indicated that the status of his mother is unknown. He indicated that his father is . He indicated that the status of his son is unknown.       SOCIAL HISTORY       Social History     Tobacco Use    Smoking status: Former     Current

## 2025-03-28 NOTE — DISCHARGE INSTRUCTIONS
Call your regular physician for follow-up today.  Your official diagnostic reading was pending at the time of discharge.    Follow-up with your regular doctor today.    You are choosing to leave AGAINST MEDICAL ADVICE.  I did not have your kidney function back, I do not have an official read on your venous Doppler.

## 2025-03-28 NOTE — ED NOTES
Lab at bedside. Pt on call light.  Reports they would like to sign out. Reports they have been in this hospital for 3 hours and they are ready to go. Made aware that the official read has not been read. Pt and wife still requesting to leave.  Provider in to speak with pt about signing out AMA and the risks of leaving. Verbalized understanding.

## 2025-03-28 NOTE — ED NOTES
Pt to ER after US. Pt has deep DVT seen in US in the  left gastrocnemius vein. Ordering provider called and was told to send pt to ED.  Pt reports he has had swelling in his right leg and was seen by his PCP and sent for US. Pt voiced no pain. Will monitor

## 2025-04-01 ENCOUNTER — LAB (OUTPATIENT)
Dept: LAB | Age: 80
End: 2025-04-01

## 2025-04-01 LAB
FOLATE SERPL-MCNC: 6.8 NG/ML (ref 4.6–34.8)
VIT B12 SERPL-MCNC: 415 PG/ML (ref 232–1245)

## 2025-04-04 ENCOUNTER — LAB (OUTPATIENT)
Dept: LAB | Age: 80
End: 2025-04-04

## 2025-04-04 LAB
ANION GAP SERPL CALC-SCNC: 9 MEQ/L (ref 8–16)
BUN SERPL-MCNC: 19 MG/DL (ref 8–23)
CALCIUM SERPL-MCNC: 9.9 MG/DL (ref 8.8–10.2)
CHLORIDE SERPL-SCNC: 99 MEQ/L (ref 98–111)
CO2 SERPL-SCNC: 30 MEQ/L (ref 22–29)
CREAT SERPL-MCNC: 0.9 MG/DL (ref 0.7–1.2)
GFR SERPL CREATININE-BSD FRML MDRD: 86 ML/MIN/1.73M2
GLUCOSE SERPL-MCNC: 161 MG/DL (ref 74–109)
NT-PROBNP SERPL IA-MCNC: 433 PG/ML (ref 0–449)
POTASSIUM SERPL-SCNC: 4.9 MEQ/L (ref 3.5–5.2)
SODIUM SERPL-SCNC: 138 MEQ/L (ref 135–145)

## 2025-04-08 ENCOUNTER — TRANSCRIBE ORDERS (OUTPATIENT)
Dept: ADMINISTRATIVE | Age: 80
End: 2025-04-08

## 2025-04-08 DIAGNOSIS — I50.9 CONGESTIVE HEART FAILURE, UNSPECIFIED HF CHRONICITY, UNSPECIFIED HEART FAILURE TYPE (HCC): Primary | ICD-10-CM

## 2025-04-09 ENCOUNTER — TRANSCRIBE ORDERS (OUTPATIENT)
Dept: ADMINISTRATIVE | Age: 80
End: 2025-04-09

## 2025-04-09 DIAGNOSIS — I50.9 CONGESTIVE HEART FAILURE, UNSPECIFIED HF CHRONICITY, UNSPECIFIED HEART FAILURE TYPE (HCC): Primary | ICD-10-CM

## 2025-04-16 ENCOUNTER — TRANSCRIBE ORDERS (OUTPATIENT)
Dept: ADMINISTRATIVE | Age: 80
End: 2025-04-16

## 2025-04-16 DIAGNOSIS — I50.9 CONGESTIVE HEART FAILURE, UNSPECIFIED HF CHRONICITY, UNSPECIFIED HEART FAILURE TYPE (HCC): Primary | ICD-10-CM

## 2025-04-17 ENCOUNTER — TRANSCRIBE ORDERS (OUTPATIENT)
Dept: ADMINISTRATIVE | Age: 80
End: 2025-04-17

## 2025-04-17 DIAGNOSIS — I50.9 CONGESTIVE HEART FAILURE, UNSPECIFIED HF CHRONICITY, UNSPECIFIED HEART FAILURE TYPE (HCC): Primary | ICD-10-CM

## 2025-04-18 ENCOUNTER — HOSPITAL ENCOUNTER (OUTPATIENT)
Age: 80
Discharge: HOME | End: 2025-04-20
Attending: INTERNAL MEDICINE
Payer: MEDICARE

## 2025-04-18 VITALS
SYSTOLIC BLOOD PRESSURE: 156 MMHG | DIASTOLIC BLOOD PRESSURE: 80 MMHG | BODY MASS INDEX: 32.58 KG/M2 | HEIGHT: 69 IN | WEIGHT: 220 LBS

## 2025-04-18 DIAGNOSIS — R06.02 SHORTNESS OF BREATH: ICD-10-CM

## 2025-04-18 LAB
ECHO AO ASC DIAM: 3.4 CM
ECHO AO ASCENDING AORTA INDEX: 1.58 CM/M2
ECHO AV CUSP MM: 2.1 CM
ECHO AV PEAK GRADIENT: 6 MMHG
ECHO AV PEAK VELOCITY: 1.2 M/S
ECHO AV VELOCITY RATIO: 0.58
ECHO BSA: 2.2 M2
ECHO EST RA PRESSURE: 10 MMHG
ECHO LA AREA 2C: 15.7 CM2
ECHO LA AREA 4C: 15.1 CM2
ECHO LA DIAMETER INDEX: 1.91 CM/M2
ECHO LA DIAMETER: 4.1 CM
ECHO LA MAJOR AXIS: 6.1 CM
ECHO LA MINOR AXIS: 5.8 CM
ECHO LA VOL BP: 33 ML (ref 18–58)
ECHO LA VOL MOD A2C: 35 ML (ref 18–58)
ECHO LA VOL MOD A4C: 30 ML (ref 18–58)
ECHO LA VOL/BSA BIPLANE: 15 ML/M2 (ref 16–34)
ECHO LA VOLUME INDEX MOD A2C: 16 ML/M2 (ref 16–34)
ECHO LA VOLUME INDEX MOD A4C: 14 ML/M2 (ref 16–34)
ECHO LV E' LATERAL VELOCITY: 7.4 CM/S
ECHO LV E' SEPTAL VELOCITY: 7.3 CM/S
ECHO LV EF PHYSICIAN: 55 %
ECHO LV FRACTIONAL SHORTENING: 30 % (ref 28–44)
ECHO LV INTERNAL DIMENSION DIASTOLE INDEX: 2.05 CM/M2
ECHO LV INTERNAL DIMENSION DIASTOLIC: 4.4 CM (ref 4.2–5.9)
ECHO LV INTERNAL DIMENSION SYSTOLIC INDEX: 1.44 CM/M2
ECHO LV INTERNAL DIMENSION SYSTOLIC: 3.1 CM
ECHO LV ISOVOLUMETRIC RELAXATION TIME (IVRT): 74 MS
ECHO LV IVSD: 1.1 CM (ref 0.6–1)
ECHO LV MASS 2D: 180 G (ref 88–224)
ECHO LV MASS INDEX 2D: 83.7 G/M2 (ref 49–115)
ECHO LV POSTERIOR WALL DIASTOLIC: 1.2 CM (ref 0.6–1)
ECHO LV RELATIVE WALL THICKNESS RATIO: 0.55
ECHO LVOT PEAK GRADIENT: 2 MMHG
ECHO LVOT PEAK VELOCITY: 0.7 M/S
ECHO MV A VELOCITY: 0.94 M/S
ECHO MV E DECELERATION TIME (DT): 197 MS
ECHO MV E VELOCITY: 1.19 M/S
ECHO MV E/A RATIO: 1.27
ECHO MV E/E' LATERAL: 16.08
ECHO MV E/E' RATIO (AVERAGED): 16.19
ECHO MV E/E' SEPTAL: 16.3
ECHO PV MAX VELOCITY: 0.6 M/S
ECHO PV PEAK GRADIENT: 2 MMHG
ECHO RV INTERNAL DIMENSION: 3.2 CM
ECHO RV TAPSE: 2.6 CM (ref 1.7–?)
ECHO TV E WAVE: 0.5 M/S

## 2025-04-18 PROCEDURE — 93306 TTE W/DOPPLER COMPLETE: CPT | Performed by: INTERNAL MEDICINE

## 2025-04-18 PROCEDURE — 93306 TTE W/DOPPLER COMPLETE: CPT

## 2025-04-21 ENCOUNTER — OFFICE VISIT (OUTPATIENT)
Dept: PULMONOLOGY | Age: 80
End: 2025-04-21
Payer: MEDICARE

## 2025-04-21 VITALS
TEMPERATURE: 98 F | WEIGHT: 228.2 LBS | OXYGEN SATURATION: 95 % | HEART RATE: 73 BPM | BODY MASS INDEX: 32.67 KG/M2 | HEIGHT: 70 IN | DIASTOLIC BLOOD PRESSURE: 60 MMHG | SYSTOLIC BLOOD PRESSURE: 126 MMHG

## 2025-04-21 DIAGNOSIS — J43.2 CENTRILOBULAR EMPHYSEMA (HCC): Primary | ICD-10-CM

## 2025-04-21 DIAGNOSIS — J96.11 CHRONIC RESPIRATORY FAILURE WITH HYPOXIA AND HYPERCAPNIA (HCC): ICD-10-CM

## 2025-04-21 DIAGNOSIS — J96.12 CHRONIC RESPIRATORY FAILURE WITH HYPOXIA AND HYPERCAPNIA (HCC): ICD-10-CM

## 2025-04-21 PROCEDURE — 1160F RVW MEDS BY RX/DR IN RCRD: CPT | Performed by: NURSE PRACTITIONER

## 2025-04-21 PROCEDURE — 99214 OFFICE O/P EST MOD 30 MIN: CPT | Performed by: NURSE PRACTITIONER

## 2025-04-21 PROCEDURE — G8427 DOCREV CUR MEDS BY ELIG CLIN: HCPCS | Performed by: NURSE PRACTITIONER

## 2025-04-21 PROCEDURE — 1036F TOBACCO NON-USER: CPT | Performed by: NURSE PRACTITIONER

## 2025-04-21 PROCEDURE — 1124F ACP DISCUSS-NO DSCNMKR DOCD: CPT | Performed by: NURSE PRACTITIONER

## 2025-04-21 PROCEDURE — 3023F SPIROM DOC REV: CPT | Performed by: NURSE PRACTITIONER

## 2025-04-21 PROCEDURE — 1159F MED LIST DOCD IN RCRD: CPT | Performed by: NURSE PRACTITIONER

## 2025-04-21 PROCEDURE — G8417 CALC BMI ABV UP PARAM F/U: HCPCS | Performed by: NURSE PRACTITIONER

## 2025-04-21 RX ORDER — ENSIFENTRINE 3 MG/2.5ML
3 SUSPENSION RESPIRATORY (INHALATION) 2 TIMES DAILY
Qty: 150 ML | Refills: 11 | OUTPATIENT
Start: 2025-04-21

## 2025-04-21 RX ORDER — ALBUTEROL SULFATE 90 UG/1
2 INHALANT RESPIRATORY (INHALATION) 4 TIMES DAILY PRN
Qty: 18 G | Refills: 5 | Status: SHIPPED | OUTPATIENT
Start: 2025-04-21

## 2025-04-21 RX ORDER — FLUTICASONE PROPIONATE AND SALMETEROL 250; 50 UG/1; UG/1
1 POWDER RESPIRATORY (INHALATION) EVERY 12 HOURS
Qty: 3 EACH | Refills: 3 | Status: SHIPPED | OUTPATIENT
Start: 2025-04-21

## 2025-04-21 NOTE — PROGRESS NOTES
Drug use: No       CURRENT MEDICATIONS:  Current Outpatient Medications   Medication Sig Dispense Refill    bumetanide (BUMEX) 0.5 MG tablet Take 1 tablet by mouth daily as needed (swelling) 30 tablet 0    celecoxib (CELEBREX) 200 MG capsule TAKE 1 CAPSULE BY MOUTH TWICE DAILY 60 capsule 5    NOVOFINE PEN NEEDLE 32G X 6 MM MISC INJECT INSULIN TWICE DAILY      fluticasone-salmeterol (ADVAIR DISKUS) 250-50 MCG/ACT AEPB diskus inhaler Inhale 1 puff into the lungs in the morning and 1 puff in the evening. 3 each 3    tiotropium (SPIRIVA RESPIMAT) 2.5 MCG/ACT AERS inhaler USE 2 INHALATIONS DAILY 12 g 3    albuterol sulfate HFA (VENTOLIN HFA) 108 (90 Base) MCG/ACT inhaler Inhale 2 puffs into the lungs 4 times daily as needed for Wheezing 18 g 5    lisinopril (PRINIVIL;ZESTRIL) 10 MG tablet Take 1 tablet by mouth 2 times daily      doxazosin (CARDURA) 4 MG tablet TAKE 1 TABLET BY MOUTH EVERY DAY      metFORMIN (GLUCOPHAGE) 500 MG tablet Take 1 tablet by mouth 2 times daily (with meals) HOLD 48 HRS PRIOR TO SURGERY 60 tablet 5    albuterol (PROVENTIL) (2.5 MG/3ML) 0.083% nebulizer solution Take 3 mLs by nebulization every 6 hours as needed for Wheezing or Shortness of Breath 120 each 3    BD INSULIN SYRINGE U/F 31G X 5/16\" 0.5 ML MISC USE AS DIRECTED UNDER THE SKIN TWICE DAILY      atorvastatin (LIPITOR) 10 MG tablet Take 1 tablet by mouth nightly      OXYGEN Inhale into the lungs 1 L at rest and 3 L with activity      TRAVATAN Z 0.004 % SOLN ophthalmic solution Place 1 drop into both eyes nightly      Insulin Lispro Prot & Lispro (HUMALOG MIX 75/25 SC) Inject 30 Units into the skin 2 times daily       quinapril (ACCUPRIL) 20 MG tablet Take 1 tablet by mouth 2 times daily      esomeprazole (NEXIUM) 40 MG delayed release capsule Take 1 capsule by mouth 2 times daily       No current facility-administered medications for this visit.       Physical exam   /60 (BP Site: Right Upper Arm, Patient Position: Sitting, BP Cuff

## 2025-04-23 ENCOUNTER — TELEPHONE (OUTPATIENT)
Dept: FAMILY MEDICINE CLINIC | Age: 80
End: 2025-04-23

## 2025-04-23 NOTE — TELEPHONE ENCOUNTER
Pt c/o back pain and is wanting to get a steroid injection. Last Depomedrol 160mg was 5/2024.  Ok to repeat x 1?

## 2025-04-24 ENCOUNTER — CLINICAL SUPPORT (OUTPATIENT)
Dept: FAMILY MEDICINE CLINIC | Age: 80
End: 2025-04-24

## 2025-04-24 DIAGNOSIS — M54.50 ACUTE LEFT-SIDED LOW BACK PAIN WITHOUT SCIATICA: Primary | ICD-10-CM

## 2025-04-24 RX ORDER — METHYLPREDNISOLONE ACETATE 80 MG/ML
160 INJECTION, SUSPENSION INTRA-ARTICULAR; INTRALESIONAL; INTRAMUSCULAR; SOFT TISSUE ONCE
Status: COMPLETED | OUTPATIENT
Start: 2025-04-24 | End: 2025-04-24

## 2025-04-24 RX ADMIN — METHYLPREDNISOLONE ACETATE 160 MG: 80 INJECTION, SUSPENSION INTRA-ARTICULAR; INTRALESIONAL; INTRAMUSCULAR; SOFT TISSUE at 08:20

## 2025-04-24 NOTE — PROGRESS NOTES
Administrations This Visit       methylPREDNISolone acetate (DEPO-MEDROL) injection 160 mg       Admin Date  04/24/2025  08:20 Action  Given Dose  160 mg Route  IntraMUSCular Site  Ventrogluteal Left  Documented By  Pati Inman CMA ([facility])    NDC: [address]-6    Lot#: [id number]    : [employer]    Patient Supplied?: No

## 2025-04-29 ENCOUNTER — TELEPHONE (OUTPATIENT)
Dept: FAMILY MEDICINE CLINIC | Age: 80
End: 2025-04-29

## 2025-04-29 DIAGNOSIS — M48.061 LUMBAR FORAMINAL STENOSIS: ICD-10-CM

## 2025-04-29 DIAGNOSIS — M47.816 LUMBAR SPONDYLOSIS: Primary | ICD-10-CM

## 2025-04-29 NOTE — TELEPHONE ENCOUNTER
Pt informed of Dr. Martinez's recommendations. Pt would like to be referred to Pain Management Dr. Del Toro for possible epidural injections. Referral placed in [facility]. Dr Del Toro's office will contact pt to schedule.

## 2025-04-29 NOTE — TELEPHONE ENCOUNTER
Recommend referral to pain management or spine specialist.  Pain management would likely perform epidural injections for pain relief, spine specialist could evaluate for potential surgical resolution

## 2025-05-01 ENCOUNTER — OFFICE VISIT (OUTPATIENT)
Dept: PHYSICAL MEDICINE AND REHAB | Age: 80
End: 2025-05-01
Payer: MEDICARE

## 2025-05-01 VITALS
DIASTOLIC BLOOD PRESSURE: 62 MMHG | HEIGHT: 70 IN | BODY MASS INDEX: 32.67 KG/M2 | SYSTOLIC BLOOD PRESSURE: 122 MMHG | WEIGHT: 228.18 LBS

## 2025-05-01 DIAGNOSIS — M54.50 CHRONIC LEFT-SIDED LOW BACK PAIN WITHOUT SCIATICA: ICD-10-CM

## 2025-05-01 DIAGNOSIS — G89.29 CHRONIC LEFT-SIDED LOW BACK PAIN WITHOUT SCIATICA: ICD-10-CM

## 2025-05-01 DIAGNOSIS — M54.50 ACUTE EXACERBATION OF CHRONIC LOW BACK PAIN: Primary | ICD-10-CM

## 2025-05-01 DIAGNOSIS — G89.29 ACUTE EXACERBATION OF CHRONIC LOW BACK PAIN: Primary | ICD-10-CM

## 2025-05-01 DIAGNOSIS — M47.816 LUMBAR SPONDYLOSIS: ICD-10-CM

## 2025-05-01 DIAGNOSIS — T14.8XXA MUSCLE STRAIN: ICD-10-CM

## 2025-05-01 DIAGNOSIS — M47.816 LUMBAR FACET ARTHROPATHY: ICD-10-CM

## 2025-05-01 DIAGNOSIS — M48.062 SPINAL STENOSIS OF LUMBAR REGION WITH NEUROGENIC CLAUDICATION: ICD-10-CM

## 2025-05-01 PROCEDURE — 1124F ACP DISCUSS-NO DSCNMKR DOCD: CPT | Performed by: NURSE PRACTITIONER

## 2025-05-01 PROCEDURE — G8417 CALC BMI ABV UP PARAM F/U: HCPCS | Performed by: NURSE PRACTITIONER

## 2025-05-01 PROCEDURE — G8427 DOCREV CUR MEDS BY ELIG CLIN: HCPCS | Performed by: NURSE PRACTITIONER

## 2025-05-01 PROCEDURE — 1125F AMNT PAIN NOTED PAIN PRSNT: CPT | Performed by: NURSE PRACTITIONER

## 2025-05-01 PROCEDURE — 99214 OFFICE O/P EST MOD 30 MIN: CPT | Performed by: NURSE PRACTITIONER

## 2025-05-01 PROCEDURE — 1159F MED LIST DOCD IN RCRD: CPT | Performed by: NURSE PRACTITIONER

## 2025-05-01 PROCEDURE — 96372 THER/PROPH/DIAG INJ SC/IM: CPT | Performed by: NURSE PRACTITIONER

## 2025-05-01 PROCEDURE — 1036F TOBACCO NON-USER: CPT | Performed by: NURSE PRACTITIONER

## 2025-05-01 RX ORDER — KETOROLAC TROMETHAMINE 30 MG/ML
30 INJECTION, SOLUTION INTRAMUSCULAR; INTRAVENOUS ONCE
Status: COMPLETED | OUTPATIENT
Start: 2025-05-01 | End: 2025-05-01

## 2025-05-01 RX ADMIN — KETOROLAC TROMETHAMINE 30 MG: 30 INJECTION, SOLUTION INTRAMUSCULAR; INTRAVENOUS at 14:58

## 2025-05-01 ASSESSMENT — ENCOUNTER SYMPTOMS
SHORTNESS OF BREATH: 1
BACK PAIN: 1
COUGH: 0
GASTROINTESTINAL NEGATIVE: 1
EYES NEGATIVE: 1
WHEEZING: 0

## 2025-05-01 NOTE — PROGRESS NOTES
intensity throughout the visualized bone  marrow.  2. There is mild canal, moderate to severe left and moderate right-sided  foraminal stenosis at L5-S1.  3. There is mild canal and mild to moderate bilateral foraminal stenosis at L3-4  and L4-5.  4. There is degenerative change involving the sacroiliac joints bilaterally.     Lumbar xray:      FINDINGS:  There is an IVC filter right of the midline. Surgical clips are in the abdomen.  There are 5 nonrib-bearing lumbar vertebral bodies. The lumbar vertebral body  heights are maintained. There is no acute compression fracture. There is grade 1  anterolisthesis of L5 over S1. There is facet arthropathy. Atherosclerosis is in  the abdominal aorta.     IMPRESSION:  Degenerative changes but no acute fracture.    Medications reviewed. Patient denies side effects with medications. Patient states he is taking medications as prescribed. Hedenies receiving pain medications from other sources. He  had 1 ER visit in March fro leg swelling.       Pain scale with out pain medications or at its worst is 0/10 with sitting, pain increases to 5-6/10 with activity and position changes       The patientis allergic to propofol and bee venom.      Subjective:      Review of Systems   Constitutional: Negative.    HENT: Negative.     Eyes: Negative.    Respiratory:  Positive for shortness of breath. Negative for cough and wheezing.         On oxygen 1-3 liters prn during the day and 1 liter at night. Is on 1 liter at this time    Cardiovascular:  Positive for leg swelling.   Gastrointestinal: Negative.    Genitourinary: Negative.    Musculoskeletal:  Positive for arthralgias, back pain and gait problem. Negative for joint swelling, myalgias, neck pain and neck stiffness.        Ambulating with strait cane    Neurological:  Positive for weakness. Negative for numbness.   Psychiatric/Behavioral: Negative.  Negative for dysphoric mood and sleep disturbance.        Objective:     Vitals:

## 2025-05-02 ENCOUNTER — HOSPITAL ENCOUNTER (OUTPATIENT)
Age: 80
Discharge: HOME | End: 2025-05-02
Payer: MEDICARE

## 2025-05-02 ENCOUNTER — HOSPITAL ENCOUNTER (OUTPATIENT)
Dept: GENERAL RADIOLOGY | Age: 80
Discharge: HOME | End: 2025-05-02
Payer: MEDICARE

## 2025-05-02 DIAGNOSIS — M54.50 ACUTE EXACERBATION OF CHRONIC LOW BACK PAIN: ICD-10-CM

## 2025-05-02 DIAGNOSIS — M47.816 LUMBAR SPONDYLOSIS: ICD-10-CM

## 2025-05-02 DIAGNOSIS — T14.8XXA MUSCLE STRAIN: ICD-10-CM

## 2025-05-02 DIAGNOSIS — G89.29 CHRONIC LEFT-SIDED LOW BACK PAIN WITHOUT SCIATICA: ICD-10-CM

## 2025-05-02 DIAGNOSIS — G89.29 ACUTE EXACERBATION OF CHRONIC LOW BACK PAIN: ICD-10-CM

## 2025-05-02 DIAGNOSIS — M54.50 CHRONIC LEFT-SIDED LOW BACK PAIN WITHOUT SCIATICA: ICD-10-CM

## 2025-05-02 DIAGNOSIS — M47.816 LUMBAR FACET ARTHROPATHY: ICD-10-CM

## 2025-05-02 PROCEDURE — 72100 X-RAY EXAM L-S SPINE 2/3 VWS: CPT

## 2025-05-27 ENCOUNTER — HOSPITAL ENCOUNTER (OUTPATIENT)
Dept: PULMONOLOGY | Age: 80
Discharge: HOME | End: 2025-05-27
Payer: MEDICARE

## 2025-05-27 DIAGNOSIS — J96.11 CHRONIC RESPIRATORY FAILURE WITH HYPOXIA AND HYPERCAPNIA (HCC): ICD-10-CM

## 2025-05-27 DIAGNOSIS — J43.2 CENTRILOBULAR EMPHYSEMA (HCC): ICD-10-CM

## 2025-05-27 DIAGNOSIS — J96.12 CHRONIC RESPIRATORY FAILURE WITH HYPOXIA AND HYPERCAPNIA (HCC): ICD-10-CM

## 2025-05-27 PROCEDURE — 94060 EVALUATION OF WHEEZING: CPT

## 2025-07-21 ENCOUNTER — OFFICE VISIT (OUTPATIENT)
Dept: PULMONOLOGY | Age: 80
End: 2025-07-21
Payer: MEDICARE

## 2025-07-21 VITALS
TEMPERATURE: 97.8 F | DIASTOLIC BLOOD PRESSURE: 72 MMHG | BODY MASS INDEX: 30.41 KG/M2 | HEIGHT: 70 IN | HEART RATE: 78 BPM | SYSTOLIC BLOOD PRESSURE: 132 MMHG | WEIGHT: 212.4 LBS | OXYGEN SATURATION: 95 %

## 2025-07-21 DIAGNOSIS — J96.12 CHRONIC RESPIRATORY FAILURE WITH HYPOXIA AND HYPERCAPNIA (HCC): Primary | ICD-10-CM

## 2025-07-21 DIAGNOSIS — J96.11 CHRONIC RESPIRATORY FAILURE WITH HYPOXIA AND HYPERCAPNIA (HCC): Primary | ICD-10-CM

## 2025-07-21 DIAGNOSIS — J43.2 CENTRILOBULAR EMPHYSEMA (HCC): ICD-10-CM

## 2025-07-21 PROCEDURE — 1036F TOBACCO NON-USER: CPT | Performed by: NURSE PRACTITIONER

## 2025-07-21 PROCEDURE — 1159F MED LIST DOCD IN RCRD: CPT | Performed by: NURSE PRACTITIONER

## 2025-07-21 PROCEDURE — 99214 OFFICE O/P EST MOD 30 MIN: CPT | Performed by: NURSE PRACTITIONER

## 2025-07-21 PROCEDURE — G8417 CALC BMI ABV UP PARAM F/U: HCPCS | Performed by: NURSE PRACTITIONER

## 2025-07-21 PROCEDURE — 1124F ACP DISCUSS-NO DSCNMKR DOCD: CPT | Performed by: NURSE PRACTITIONER

## 2025-07-21 PROCEDURE — 1160F RVW MEDS BY RX/DR IN RCRD: CPT | Performed by: NURSE PRACTITIONER

## 2025-07-21 PROCEDURE — 94618 PULMONARY STRESS TESTING: CPT | Performed by: NURSE PRACTITIONER

## 2025-07-21 PROCEDURE — 3023F SPIROM DOC REV: CPT | Performed by: NURSE PRACTITIONER

## 2025-07-21 PROCEDURE — G8427 DOCREV CUR MEDS BY ELIG CLIN: HCPCS | Performed by: NURSE PRACTITIONER

## 2025-07-21 RX ORDER — DOCUSATE SODIUM 100 MG/1
CAPSULE, LIQUID FILLED ORAL
COMMUNITY
Start: 2025-07-10

## 2025-07-21 NOTE — PROGRESS NOTES
[facility]     Patient: DAISY CHIRINOS, 79 y.o.   : 1945    Pt of mine      Subjective     Chief Complaint   Patient presents with    Follow-up     3 month follow up with JESSY 5.27.25        History of Present Illness  The patient is an 79-year-old male who presents for a 6-month COPD follow-up. He completed spirometry testing on 2025.    COPD Follow-up  He reports that his breathing has been stable, with no significant changes. He experiences intermittent shortness of breath but does not report any coughing or wheezing. He has not required prednisone, steroids, or antibiotics for any recent respiratory flare-ups.    He was previously prescribed O2 Verity, which he found ineffective and costly, leading him to discontinue its use. He does not frequently use albuterol for breathing treatments or as a rescue inhaler.    Oxygen Use  He uses oxygen as needed, typically when sitting at home watching television. He also reports difficulty walking due to leg weakness. He is currently using 1 liter of portable oxygen.      SOCIAL HISTORY  - Tobacco: The patient stopped smoking       Immunization History   Administered Date(s) Administered    COVID-19, MODERNA BLUE border, Primary or Immunocompromised, (age 12y+), IM, 100 mcg/0.5mL 2021, 2021, 2021, 2022    COVID-19, MODERNA Bivalent, (age 12y+), IM, 50 mcg/0.5 mL 2022    COVID-19, MODERNA, , (age 12y+), IM, 50mcg/0.5mL 10/02/2023, 10/18/2024    COVID-19, [place] Vaccine, Vaccine Unspecified 2021, 2021, 2021, 2022    Influenza A ([id number]) Vaccine PF IM 2009    Influenza Vaccine, unspecified formulation 2015, 10/22/2016    Influenza Virus Vaccine 10/27/2012, 2013, 10/01/2014, 10/19/2017    Influenza, FLUAD, (age 65 y+), IM, Quadv, 0.5mL 2021    Influenza, FLUAD, (age 65 y+), IM, Trivalent PF, 0.5mL 10/18/2024    Influenza, FLUZONE